# Patient Record
Sex: MALE | Race: WHITE | HISPANIC OR LATINO | Employment: UNEMPLOYED | URBAN - METROPOLITAN AREA
[De-identification: names, ages, dates, MRNs, and addresses within clinical notes are randomized per-mention and may not be internally consistent; named-entity substitution may affect disease eponyms.]

---

## 2018-06-25 ENCOUNTER — HOSPITAL ENCOUNTER (OUTPATIENT)
Dept: RADIOLOGY | Facility: MEDICAL CENTER | Age: 38
End: 2018-06-25

## 2018-06-25 ENCOUNTER — APPOINTMENT (OUTPATIENT)
Dept: RADIOLOGY | Facility: MEDICAL CENTER | Age: 38
DRG: 988 | End: 2018-06-25
Attending: EMERGENCY MEDICINE
Payer: COMMERCIAL

## 2018-06-25 ENCOUNTER — HOSPITAL ENCOUNTER (INPATIENT)
Facility: MEDICAL CENTER | Age: 38
LOS: 11 days | DRG: 988 | End: 2018-07-06
Attending: EMERGENCY MEDICINE | Admitting: FAMILY MEDICINE
Payer: COMMERCIAL

## 2018-06-25 DIAGNOSIS — F10.930 ALCOHOL WITHDRAWAL SEIZURE WITHOUT COMPLICATION (HCC): ICD-10-CM

## 2018-06-25 DIAGNOSIS — S02.32XA CLOSED FRACTURE OF LEFT ORBITAL FLOOR, INITIAL ENCOUNTER (HCC): ICD-10-CM

## 2018-06-25 DIAGNOSIS — R56.9 ALCOHOL WITHDRAWAL SEIZURE WITHOUT COMPLICATION (HCC): ICD-10-CM

## 2018-06-25 LAB
ALBUMIN SERPL BCP-MCNC: 4.3 G/DL (ref 3.2–4.9)
ALBUMIN/GLOB SERPL: 1.7 G/DL
ALP SERPL-CCNC: 61 U/L (ref 30–99)
ALT SERPL-CCNC: 42 U/L (ref 2–50)
ANION GAP SERPL CALC-SCNC: 15 MMOL/L (ref 0–11.9)
AST SERPL-CCNC: 91 U/L (ref 12–45)
BASOPHILS # BLD AUTO: 0.4 % (ref 0–1.8)
BASOPHILS # BLD: 0.04 K/UL (ref 0–0.12)
BILIRUB SERPL-MCNC: 2 MG/DL (ref 0.1–1.5)
BUN SERPL-MCNC: 6 MG/DL (ref 8–22)
CALCIUM SERPL-MCNC: 8.9 MG/DL (ref 8.5–10.5)
CHLORIDE SERPL-SCNC: 99 MMOL/L (ref 96–112)
CO2 SERPL-SCNC: 23 MMOL/L (ref 20–33)
CREAT SERPL-MCNC: 0.62 MG/DL (ref 0.5–1.4)
EOSINOPHIL # BLD AUTO: 0 K/UL (ref 0–0.51)
EOSINOPHIL NFR BLD: 0 % (ref 0–6.9)
ERYTHROCYTE [DISTWIDTH] IN BLOOD BY AUTOMATED COUNT: 48.6 FL (ref 35.9–50)
GLOBULIN SER CALC-MCNC: 2.6 G/DL (ref 1.9–3.5)
GLUCOSE SERPL-MCNC: 82 MG/DL (ref 65–99)
HCT VFR BLD AUTO: 40.4 % (ref 42–52)
HGB BLD-MCNC: 14 G/DL (ref 14–18)
IMM GRANULOCYTES # BLD AUTO: 0.03 K/UL (ref 0–0.11)
IMM GRANULOCYTES NFR BLD AUTO: 0.3 % (ref 0–0.9)
INR PPP: 1.1 (ref 0.87–1.13)
LYMPHOCYTES # BLD AUTO: 0.77 K/UL (ref 1–4.8)
LYMPHOCYTES NFR BLD: 8.1 % (ref 22–41)
MCH RBC QN AUTO: 33 PG (ref 27–33)
MCHC RBC AUTO-ENTMCNC: 34.7 G/DL (ref 33.7–35.3)
MCV RBC AUTO: 95.3 FL (ref 81.4–97.8)
MONOCYTES # BLD AUTO: 0.61 K/UL (ref 0–0.85)
MONOCYTES NFR BLD AUTO: 6.4 % (ref 0–13.4)
NEUTROPHILS # BLD AUTO: 8.11 K/UL (ref 1.82–7.42)
NEUTROPHILS NFR BLD: 84.8 % (ref 44–72)
NRBC # BLD AUTO: 0 K/UL
NRBC BLD-RTO: 0 /100 WBC
PLATELET # BLD AUTO: 70 K/UL (ref 164–446)
PMV BLD AUTO: 11 FL (ref 9–12.9)
POTASSIUM SERPL-SCNC: 3.8 MMOL/L (ref 3.6–5.5)
PROT SERPL-MCNC: 6.9 G/DL (ref 6–8.2)
PROTHROMBIN TIME: 13.9 SEC (ref 12–14.6)
RBC # BLD AUTO: 4.24 M/UL (ref 4.7–6.1)
SODIUM SERPL-SCNC: 137 MMOL/L (ref 135–145)
WBC # BLD AUTO: 9.6 K/UL (ref 4.8–10.8)

## 2018-06-25 PROCEDURE — 85025 COMPLETE CBC W/AUTO DIFF WBC: CPT

## 2018-06-25 PROCEDURE — A9270 NON-COVERED ITEM OR SERVICE: HCPCS | Performed by: EMERGENCY MEDICINE

## 2018-06-25 PROCEDURE — 99233 SBSQ HOSP IP/OBS HIGH 50: CPT | Performed by: FAMILY MEDICINE

## 2018-06-25 PROCEDURE — 303747 HCHG EXTRA SUTURE

## 2018-06-25 PROCEDURE — 700102 HCHG RX REV CODE 250 W/ 637 OVERRIDE(OP): Performed by: EMERGENCY MEDICINE

## 2018-06-25 PROCEDURE — 0WQ2XZZ REPAIR FACE, EXTERNAL APPROACH: ICD-10-PCS | Performed by: EMERGENCY MEDICINE

## 2018-06-25 PROCEDURE — 70486 CT MAXILLOFACIAL W/O DYE: CPT

## 2018-06-25 PROCEDURE — 99285 EMERGENCY DEPT VISIT HI MDM: CPT

## 2018-06-25 PROCEDURE — 80053 COMPREHEN METABOLIC PANEL: CPT

## 2018-06-25 PROCEDURE — 85610 PROTHROMBIN TIME: CPT

## 2018-06-25 PROCEDURE — 304999 HCHG REPAIR-SIMPLE/INTERMED LEVEL 1

## 2018-06-25 PROCEDURE — 770020 HCHG ROOM/CARE - TELE (206)

## 2018-06-25 PROCEDURE — HZ2ZZZZ DETOXIFICATION SERVICES FOR SUBSTANCE ABUSE TREATMENT: ICD-10-PCS | Performed by: FAMILY MEDICINE

## 2018-06-25 RX ORDER — LORAZEPAM 1 MG/1
1 TABLET ORAL ONCE
Status: COMPLETED | OUTPATIENT
Start: 2018-06-25 | End: 2018-06-25

## 2018-06-25 RX ADMIN — LORAZEPAM 1 MG: 1 TABLET ORAL at 21:38

## 2018-06-25 ASSESSMENT — ENCOUNTER SYMPTOMS
SEIZURES: 1
TINGLING: 0
NAUSEA: 0
DIZZINESS: 0
SPEECH CHANGE: 0
VOMITING: 0
CHILLS: 0
HEADACHES: 0
FEVER: 0
SENSORY CHANGE: 0
TREMORS: 0
LOSS OF CONSCIOUSNESS: 1

## 2018-06-25 ASSESSMENT — LIFESTYLE VARIABLES
TREMOR: MODERATE TREMOR WITH ARMS EXTENDED
AGITATION: NORMAL ACTIVITY
DOES PATIENT WANT TO TALK TO SOMEONE ABOUT QUITTING: NO
TACTILE DISTURBANCES: VERY MILD ITCHING, PINS AND NEEDLES SENSATION, BURNING OR NUMBNESS
ANXIETY: NO ANXIETY (AT EASE)
CONSUMPTION TOTAL: POSITIVE
EVER FELT BAD OR GUILTY ABOUT YOUR DRINKING: YES
NAUSEA AND VOMITING: NO NAUSEA AND NO VOMITING
TOTAL SCORE: 3
AVERAGE NUMBER OF DAYS PER WEEK YOU HAVE A DRINK CONTAINING ALCOHOL: 7
HOW MANY TIMES IN THE PAST YEAR HAVE YOU HAD 5 OR MORE DRINKS IN A DAY: 365
HAVE YOU EVER FELT YOU SHOULD CUT DOWN ON YOUR DRINKING: YES
TOTAL SCORE: 3
VISUAL DISTURBANCES: NOT PRESENT
AUDITORY DISTURBANCES: NOT PRESENT
TOTAL SCORE: 3
EVER HAD A DRINK FIRST THING IN THE MORNING TO STEADY YOUR NERVES TO GET RID OF A HANGOVER: YES
TOTAL SCORE: 9
HEADACHE, FULLNESS IN HEAD: VERY MILD
ORIENTATION AND CLOUDING OF SENSORIUM: ORIENTED AND CAN DO SERIAL ADDITIONS
DO YOU DRINK ALCOHOL: YES
HAVE PEOPLE ANNOYED YOU BY CRITICIZING YOUR DRINKING: NO
ON A TYPICAL DAY WHEN YOU DRINK ALCOHOL HOW MANY DRINKS DO YOU HAVE: 7
PAROXYSMAL SWEATS: *
DOES PATIENT WANT TO STOP DRINKING: YES

## 2018-06-25 ASSESSMENT — PAIN SCALES - GENERAL: PAINLEVEL_OUTOF10: 2

## 2018-06-26 ENCOUNTER — PATIENT OUTREACH (OUTPATIENT)
Dept: HEALTH INFORMATION MANAGEMENT | Facility: OTHER | Age: 38
End: 2018-06-26

## 2018-06-26 PROBLEM — Z72.0 TOBACCO USE: Status: ACTIVE | Noted: 2018-06-26

## 2018-06-26 PROBLEM — F10.931 DELIRIUM TREMENS (HCC): Status: ACTIVE | Noted: 2018-06-26

## 2018-06-26 PROBLEM — S02.85XA CLOSED FRACTURE OF ORBIT (HCC): Status: ACTIVE | Noted: 2018-06-26

## 2018-06-26 PROBLEM — S09.93XA FACIAL TRAUMA: Status: ACTIVE | Noted: 2018-06-26

## 2018-06-26 PROBLEM — D69.6 THROMBOCYTOPENIA (HCC): Status: ACTIVE | Noted: 2018-06-26

## 2018-06-26 LAB
EKG IMPRESSION: NORMAL
EKG IMPRESSION: NORMAL
TSH SERPL DL<=0.005 MIU/L-ACNC: 2.36 UIU/ML (ref 0.38–5.33)

## 2018-06-26 PROCEDURE — 700102 HCHG RX REV CODE 250 W/ 637 OVERRIDE(OP): Performed by: FAMILY MEDICINE

## 2018-06-26 PROCEDURE — 99232 SBSQ HOSP IP/OBS MODERATE 35: CPT | Performed by: FAMILY MEDICINE

## 2018-06-26 PROCEDURE — A9270 NON-COVERED ITEM OR SERVICE: HCPCS | Performed by: FAMILY MEDICINE

## 2018-06-26 PROCEDURE — 700101 HCHG RX REV CODE 250: Performed by: FAMILY MEDICINE

## 2018-06-26 PROCEDURE — 700102 HCHG RX REV CODE 250 W/ 637 OVERRIDE(OP): Performed by: INTERNAL MEDICINE

## 2018-06-26 PROCEDURE — 84443 ASSAY THYROID STIM HORMONE: CPT

## 2018-06-26 PROCEDURE — 700111 HCHG RX REV CODE 636 W/ 250 OVERRIDE (IP): Performed by: FAMILY MEDICINE

## 2018-06-26 PROCEDURE — A9270 NON-COVERED ITEM OR SERVICE: HCPCS | Performed by: INTERNAL MEDICINE

## 2018-06-26 PROCEDURE — 700105 HCHG RX REV CODE 258: Performed by: FAMILY MEDICINE

## 2018-06-26 PROCEDURE — 770022 HCHG ROOM/CARE - ICU (200)

## 2018-06-26 PROCEDURE — 93005 ELECTROCARDIOGRAM TRACING: CPT | Performed by: FAMILY MEDICINE

## 2018-06-26 PROCEDURE — 93010 ELECTROCARDIOGRAM REPORT: CPT | Performed by: INTERNAL MEDICINE

## 2018-06-26 PROCEDURE — 93010 ELECTROCARDIOGRAM REPORT: CPT | Mod: 77 | Performed by: INTERNAL MEDICINE

## 2018-06-26 RX ORDER — LORAZEPAM 0.5 MG/1
0.5 TABLET ORAL EVERY 4 HOURS PRN
Status: DISCONTINUED | OUTPATIENT
Start: 2018-06-26 | End: 2018-06-27

## 2018-06-26 RX ORDER — PROMETHAZINE HYDROCHLORIDE 25 MG/1
12.5-25 SUPPOSITORY RECTAL EVERY 4 HOURS PRN
Status: DISCONTINUED | OUTPATIENT
Start: 2018-06-26 | End: 2018-07-06 | Stop reason: HOSPADM

## 2018-06-26 RX ORDER — LORAZEPAM 1 MG/1
1 TABLET ORAL EVERY 4 HOURS PRN
Status: DISCONTINUED | OUTPATIENT
Start: 2018-06-26 | End: 2018-06-27

## 2018-06-26 RX ORDER — BACITRACIN ZINC AND POLYMYXIN B SULFATE 500; 1000 [USP'U]/G; [USP'U]/G
OINTMENT TOPICAL 2 TIMES DAILY
Status: DISCONTINUED | OUTPATIENT
Start: 2018-06-26 | End: 2018-07-06 | Stop reason: HOSPADM

## 2018-06-26 RX ORDER — PROMETHAZINE HYDROCHLORIDE 25 MG/1
12.5-25 TABLET ORAL EVERY 4 HOURS PRN
Status: DISCONTINUED | OUTPATIENT
Start: 2018-06-26 | End: 2018-06-27

## 2018-06-26 RX ORDER — NICOTINE 21 MG/24HR
14 PATCH, TRANSDERMAL 24 HOURS TRANSDERMAL
Status: DISCONTINUED | OUTPATIENT
Start: 2018-06-26 | End: 2018-07-06 | Stop reason: HOSPADM

## 2018-06-26 RX ORDER — SODIUM CHLORIDE 9 MG/ML
INJECTION, SOLUTION INTRAVENOUS CONTINUOUS
Status: DISPENSED | OUTPATIENT
Start: 2018-06-26 | End: 2018-06-27

## 2018-06-26 RX ORDER — LORAZEPAM 2 MG/ML
0.5 INJECTION INTRAMUSCULAR EVERY 4 HOURS PRN
Status: DISCONTINUED | OUTPATIENT
Start: 2018-06-26 | End: 2018-06-27

## 2018-06-26 RX ORDER — AMOXICILLIN 250 MG
2 CAPSULE ORAL 2 TIMES DAILY
Status: DISCONTINUED | OUTPATIENT
Start: 2018-06-26 | End: 2018-06-27

## 2018-06-26 RX ORDER — ONDANSETRON 2 MG/ML
4 INJECTION INTRAMUSCULAR; INTRAVENOUS EVERY 4 HOURS PRN
Status: DISCONTINUED | OUTPATIENT
Start: 2018-06-26 | End: 2018-07-06 | Stop reason: HOSPADM

## 2018-06-26 RX ORDER — THIAMINE MONONITRATE (VIT B1) 100 MG
100 TABLET ORAL DAILY
Status: DISCONTINUED | OUTPATIENT
Start: 2018-06-27 | End: 2018-06-27

## 2018-06-26 RX ORDER — LORAZEPAM 1 MG/1
2 TABLET ORAL
Status: DISCONTINUED | OUTPATIENT
Start: 2018-06-26 | End: 2018-06-27

## 2018-06-26 RX ORDER — LORAZEPAM 2 MG/ML
2 INJECTION INTRAMUSCULAR
Status: DISCONTINUED | OUTPATIENT
Start: 2018-06-26 | End: 2018-06-27

## 2018-06-26 RX ORDER — LORAZEPAM 1 MG/1
4 TABLET ORAL
Status: DISCONTINUED | OUTPATIENT
Start: 2018-06-26 | End: 2018-06-27

## 2018-06-26 RX ORDER — LORAZEPAM 2 MG/ML
1 INJECTION INTRAMUSCULAR
Status: DISCONTINUED | OUTPATIENT
Start: 2018-06-26 | End: 2018-06-27

## 2018-06-26 RX ORDER — ENALAPRILAT 1.25 MG/ML
1.25 INJECTION INTRAVENOUS EVERY 6 HOURS PRN
Status: DISCONTINUED | OUTPATIENT
Start: 2018-06-26 | End: 2018-07-06 | Stop reason: HOSPADM

## 2018-06-26 RX ORDER — GABAPENTIN 100 MG/1
100 CAPSULE ORAL 3 TIMES DAILY
Status: DISCONTINUED | OUTPATIENT
Start: 2018-06-26 | End: 2018-06-27

## 2018-06-26 RX ORDER — FOLIC ACID 1 MG/1
1 TABLET ORAL DAILY
Status: DISCONTINUED | OUTPATIENT
Start: 2018-06-27 | End: 2018-06-27

## 2018-06-26 RX ORDER — BISACODYL 10 MG
10 SUPPOSITORY, RECTAL RECTAL
Status: DISCONTINUED | OUTPATIENT
Start: 2018-06-26 | End: 2018-06-27

## 2018-06-26 RX ORDER — LORAZEPAM 1 MG/1
3 TABLET ORAL
Status: DISCONTINUED | OUTPATIENT
Start: 2018-06-26 | End: 2018-06-27

## 2018-06-26 RX ORDER — LORAZEPAM 2 MG/ML
1.5 INJECTION INTRAMUSCULAR
Status: DISCONTINUED | OUTPATIENT
Start: 2018-06-26 | End: 2018-06-27

## 2018-06-26 RX ORDER — DIVALPROEX SODIUM 125 MG/1
250 CAPSULE, COATED PELLETS ORAL EVERY 8 HOURS
Status: DISCONTINUED | OUTPATIENT
Start: 2018-06-26 | End: 2018-06-27

## 2018-06-26 RX ORDER — POLYETHYLENE GLYCOL 3350 17 G/17G
1 POWDER, FOR SOLUTION ORAL
Status: DISCONTINUED | OUTPATIENT
Start: 2018-06-26 | End: 2018-06-27

## 2018-06-26 RX ORDER — ONDANSETRON 4 MG/1
4 TABLET, ORALLY DISINTEGRATING ORAL EVERY 4 HOURS PRN
Status: DISCONTINUED | OUTPATIENT
Start: 2018-06-26 | End: 2018-07-01

## 2018-06-26 RX ORDER — HYDROCODONE BITARTRATE AND ACETAMINOPHEN 5; 325 MG/1; MG/1
1 TABLET ORAL EVERY 6 HOURS PRN
Status: DISCONTINUED | OUTPATIENT
Start: 2018-06-26 | End: 2018-06-27

## 2018-06-26 RX ADMIN — HYDROCODONE BITARTRATE AND ACETAMINOPHEN 1 TABLET: 5; 325 TABLET ORAL at 01:57

## 2018-06-26 RX ADMIN — SODIUM CHLORIDE: 9 INJECTION, SOLUTION INTRAVENOUS at 01:02

## 2018-06-26 RX ADMIN — SODIUM CHLORIDE: 9 INJECTION, SOLUTION INTRAVENOUS at 22:10

## 2018-06-26 RX ADMIN — LORAZEPAM 0.5 MG: 0.5 TABLET ORAL at 11:55

## 2018-06-26 RX ADMIN — LORAZEPAM 2 MG: 1 TABLET ORAL at 01:20

## 2018-06-26 RX ADMIN — LORAZEPAM 1 MG: 1 TABLET ORAL at 07:01

## 2018-06-26 RX ADMIN — Medication 1 EACH: at 15:52

## 2018-06-26 RX ADMIN — POTASSIUM CHLORIDE: 2 INJECTION, SOLUTION, CONCENTRATE INTRAVENOUS at 01:57

## 2018-06-26 RX ADMIN — SODIUM CHLORIDE: 9 INJECTION, SOLUTION INTRAVENOUS at 09:14

## 2018-06-26 RX ADMIN — LORAZEPAM 3 MG: 1 TABLET ORAL at 20:28

## 2018-06-26 RX ADMIN — LORAZEPAM 1 MG: 1 TABLET ORAL at 03:10

## 2018-06-26 RX ADMIN — LORAZEPAM 1 MG: 2 INJECTION INTRAMUSCULAR; INTRAVENOUS at 16:40

## 2018-06-26 RX ADMIN — GABAPENTIN 100 MG: 100 CAPSULE ORAL at 18:30

## 2018-06-26 RX ADMIN — LORAZEPAM 1 MG: 1 TABLET ORAL at 15:48

## 2018-06-26 RX ADMIN — LORAZEPAM 3 MG: 1 TABLET ORAL at 19:17

## 2018-06-26 RX ADMIN — HYDROCODONE BITARTRATE AND ACETAMINOPHEN 1 TABLET: 5; 325 TABLET ORAL at 11:55

## 2018-06-26 RX ADMIN — DIVALPROEX SODIUM 250 MG: 125 CAPSULE, COATED PELLETS ORAL at 22:00

## 2018-06-26 RX ADMIN — Medication 1 EACH: at 18:30

## 2018-06-26 RX ADMIN — LORAZEPAM 1 MG: 1 TABLET ORAL at 18:30

## 2018-06-26 ASSESSMENT — LIFESTYLE VARIABLES
AUDITORY DISTURBANCES: NOT PRESENT
AGITATION: *
ANXIETY: NO ANXIETY (AT EASE)
AGITATION: *
ORIENTATION AND CLOUDING OF SENSORIUM: ORIENTED AND CAN DO SERIAL ADDITIONS
AUDITORY DISTURBANCES: NOT PRESENT
TOTAL SCORE: 20
TOTAL SCORE: VERY MILD ITCHING, PINS AND NEEDLES SENSATION, BURNING OR NUMBNESS
ORIENTATION AND CLOUDING OF SENSORIUM: DATE DISORIENTATION BY MORE THAN TWO CALENDAR DAYS
TOTAL SCORE: 9
VISUAL DISTURBANCES: NOT PRESENT
ANXIETY: MILDLY ANXIOUS
PAROXYSMAL SWEATS: BARELY PERCEPTIBLE SWEATING. PALMS MOIST
AGITATION: PACES BACK AND FORTH DURING MOST OF THE INTERVIEW OR CONSTANTLY THRASHES ABOUT.
HEADACHE, FULLNESS IN HEAD: NOT PRESENT
TOTAL SCORE: VERY MILD ITCHING, PINS AND NEEDLES SENSATION, BURNING OR NUMBNESS
AUDITORY DISTURBANCES: NOT PRESENT
AUDITORY DISTURBANCES: NOT PRESENT
TOTAL SCORE: VERY MILD ITCHING, PINS AND NEEDLES SENSATION, BURNING OR NUMBNESS
PAROXYSMAL SWEATS: *
TREMOR: TREMOR NOT VISIBLE BUT CAN BE FELT, FINGERTIP TO FINGERTIP
TREMOR: TREMOR NOT VISIBLE BUT CAN BE FELT, FINGERTIP TO FINGERTIP
TOTAL SCORE: 9
AGITATION: PACES BACK AND FORTH DURING MOST OF THE INTERVIEW OR CONSTANTLY THRASHES ABOUT.
ANXIETY: EQUIVALENT TO ACUTE PANIC STATES AS OCCUR IN SEVERE DELIRIUM OR ACUTE SCHIZOPHRENIC REACTIONS
ANXIETY: EQUIVALENT TO ACUTE PANIC STATES AS OCCUR IN SEVERE DELIRIUM OR ACUTE SCHIZOPHRENIC REACTIONS
ANXIETY: *
NAUSEA AND VOMITING: NO NAUSEA AND NO VOMITING
PAROXYSMAL SWEATS: BEADS OF SWEAT OBVIOUS ON FOREHEAD
HEADACHE, FULLNESS IN HEAD: VERY MILD
TREMOR: *
AUDITORY DISTURBANCES: NOT PRESENT
TREMOR: MODERATE TREMOR WITH ARMS EXTENDED
ORIENTATION AND CLOUDING OF SENSORIUM: ORIENTED AND CAN DO SERIAL ADDITIONS
NAUSEA AND VOMITING: NO NAUSEA AND NO VOMITING
PAROXYSMAL SWEATS: NO SWEAT VISIBLE
AUDITORY DISTURBANCES: NOT PRESENT
TOTAL SCORE: 18
AUDITORY DISTURBANCES: NOT PRESENT
TOTAL SCORE: 11
PAROXYSMAL SWEATS: *
AUDITORY DISTURBANCES: NOT PRESENT
AGITATION: NORMAL ACTIVITY
HEADACHE, FULLNESS IN HEAD: VERY MILD
HEADACHE, FULLNESS IN HEAD: NOT PRESENT
ANXIETY: EQUIVALENT TO ACUTE PANIC STATES AS OCCUR IN SEVERE DELIRIUM OR ACUTE SCHIZOPHRENIC REACTIONS
ORIENTATION AND CLOUDING OF SENSORIUM: DATE DISORIENTATION BY MORE THAN TWO CALENDAR DAYS
PAROXYSMAL SWEATS: *
ORIENTATION AND CLOUDING OF SENSORIUM: ORIENTED AND CAN DO SERIAL ADDITIONS
NAUSEA AND VOMITING: NO NAUSEA AND NO VOMITING
TOTAL SCORE: MODERATE ITCHING, PINS AND NEEDLES SENSATION, BURNING OR NUMBNESS
ANXIETY: NO ANXIETY (AT EASE)
VISUAL DISTURBANCES: NOT PRESENT
TOTAL SCORE: 17
ORIENTATION AND CLOUDING OF SENSORIUM: ORIENTED AND CAN DO SERIAL ADDITIONS
AGITATION: *
TREMOR: MODERATE TREMOR WITH ARMS EXTENDED
VISUAL DISTURBANCES: NOT PRESENT
NAUSEA AND VOMITING: NO NAUSEA AND NO VOMITING
HEADACHE, FULLNESS IN HEAD: NOT PRESENT
NAUSEA AND VOMITING: NO NAUSEA AND NO VOMITING
TOTAL SCORE: 8
NAUSEA AND VOMITING: NO NAUSEA AND NO VOMITING
HEADACHE, FULLNESS IN HEAD: NOT PRESENT
AGITATION: NORMAL ACTIVITY
NAUSEA AND VOMITING: NO NAUSEA AND NO VOMITING
AGITATION: MODERATELY FIDGETY AND RESTLESS
VISUAL DISTURBANCES: NOT PRESENT
PAROXYSMAL SWEATS: BEADS OF SWEAT OBVIOUS ON FOREHEAD
NAUSEA AND VOMITING: NO NAUSEA AND NO VOMITING
HEADACHE, FULLNESS IN HEAD: VERY MILD
HEADACHE, FULLNESS IN HEAD: VERY MILD
AGITATION: PACES BACK AND FORTH DURING MOST OF THE INTERVIEW OR CONSTANTLY THRASHES ABOUT.
ORIENTATION AND CLOUDING OF SENSORIUM: CANNOT DO SERIAL ADDITIONS OR IS UNCERTAIN ABOUT DATE
HEADACHE, FULLNESS IN HEAD: VERY MILD
NAUSEA AND VOMITING: NO NAUSEA AND NO VOMITING
PAROXYSMAL SWEATS: BARELY PERCEPTIBLE SWEATING. PALMS MOIST
TOTAL SCORE: 24
AUDITORY DISTURBANCES: NOT PRESENT
ANXIETY: NO ANXIETY (AT EASE)
ORIENTATION AND CLOUDING OF SENSORIUM: ORIENTED AND CAN DO SERIAL ADDITIONS
ORIENTATION AND CLOUDING OF SENSORIUM: ORIENTED AND CAN DO SERIAL ADDITIONS
AGITATION: PACES BACK AND FORTH DURING MOST OF THE INTERVIEW OR CONSTANTLY THRASHES ABOUT.
VISUAL DISTURBANCES: NOT PRESENT
AUDITORY DISTURBANCES: NOT PRESENT
TOTAL SCORE: 8
NAUSEA AND VOMITING: NO NAUSEA AND NO VOMITING
EVER_SMOKED: YES
AUDITORY DISTURBANCES: NOT PRESENT
ANXIETY: NO ANXIETY (AT EASE)
NAUSEA AND VOMITING: NO NAUSEA AND NO VOMITING
AUDITORY DISTURBANCES: NOT PRESENT
TREMOR: MODERATE TREMOR WITH ARMS EXTENDED
ORIENTATION AND CLOUDING OF SENSORIUM: DATE DISORIENTATION BY MORE THAN TWO CALENDAR DAYS
TREMOR: *
VISUAL DISTURBANCES: NOT PRESENT
TOTAL SCORE: 7
TOTAL SCORE: 14
NAUSEA AND VOMITING: NO NAUSEA AND NO VOMITING
ORIENTATION AND CLOUDING OF SENSORIUM: ORIENTED AND CAN DO SERIAL ADDITIONS
VISUAL DISTURBANCES: NOT PRESENT
ORIENTATION AND CLOUDING OF SENSORIUM: DATE DISORIENTATION BY MORE THAN TWO CALENDAR DAYS
HEADACHE, FULLNESS IN HEAD: VERY MILD
VISUAL DISTURBANCES: NOT PRESENT
VISUAL DISTURBANCES: NOT PRESENT
ANXIETY: MILDLY ANXIOUS
PAROXYSMAL SWEATS: *
TOTAL SCORE: 23
AGITATION: NORMAL ACTIVITY
VISUAL DISTURBANCES: NOT PRESENT
HEADACHE, FULLNESS IN HEAD: VERY MILD
PAROXYSMAL SWEATS: BARELY PERCEPTIBLE SWEATING. PALMS MOIST
TREMOR: MODERATE TREMOR WITH ARMS EXTENDED
AGITATION: NORMAL ACTIVITY
ANXIETY: MILDLY ANXIOUS
TREMOR: *
VISUAL DISTURBANCES: NOT PRESENT
PAROXYSMAL SWEATS: *
VISUAL DISTURBANCES: NOT PRESENT
PAROXYSMAL SWEATS: *
HEADACHE, FULLNESS IN HEAD: NOT PRESENT
ANXIETY: MODERATELY ANXIOUS OR GUARDED, SO ANXIETY IS INFERRED
TREMOR: TREMOR NOT VISIBLE BUT CAN BE FELT, FINGERTIP TO FINGERTIP
TOTAL SCORE: VERY MILD ITCHING, PINS AND NEEDLES SENSATION, BURNING OR NUMBNESS
TREMOR: *
TREMOR: MODERATE TREMOR WITH ARMS EXTENDED

## 2018-06-26 ASSESSMENT — ENCOUNTER SYMPTOMS
VOMITING: 0
MYALGIAS: 0
SHORTNESS OF BREATH: 0
NECK PAIN: 0
BRUISES/BLEEDS EASILY: 0
DEPRESSION: 0
HEARTBURN: 0
WHEEZING: 0
DIZZINESS: 0
NAUSEA: 0
SPEECH CHANGE: 0
DOUBLE VISION: 0
SORE THROAT: 0
NERVOUS/ANXIOUS: 0
BLURRED VISION: 0
SENSORY CHANGE: 0
FALLS: 1
FOCAL WEAKNESS: 0
DIARRHEA: 0
CHILLS: 0
FEVER: 0
NAUSEA: 1
BACK PAIN: 0
PALPITATIONS: 0
HEADACHES: 0
HEMOPTYSIS: 0
WEAKNESS: 0
ABDOMINAL PAIN: 0
COUGH: 0

## 2018-06-26 ASSESSMENT — PATIENT HEALTH QUESTIONNAIRE - PHQ9
1. LITTLE INTEREST OR PLEASURE IN DOING THINGS: NOT AT ALL
2. FEELING DOWN, DEPRESSED, IRRITABLE, OR HOPELESS: NOT AT ALL
SUM OF ALL RESPONSES TO PHQ9 QUESTIONS 1 AND 2: 0
2. FEELING DOWN, DEPRESSED, IRRITABLE, OR HOPELESS: NOT AT ALL
1. LITTLE INTEREST OR PLEASURE IN DOING THINGS: NOT AT ALL
SUM OF ALL RESPONSES TO PHQ9 QUESTIONS 1 AND 2: 0

## 2018-06-26 ASSESSMENT — PAIN SCALES - GENERAL
PAINLEVEL_OUTOF10: 0
PAINLEVEL_OUTOF10: 1
PAINLEVEL_OUTOF10: 8
PAINLEVEL_OUTOF10: 1
PAINLEVEL_OUTOF10: 0
PAINLEVEL_OUTOF10: 8
PAINLEVEL_OUTOF10: 0

## 2018-06-26 NOTE — CARE PLAN
Problem: Knowledge Deficit  Goal: Knowledge of disease process/condition, treatment plan, diagnostic tests, and medications will improve  Outcome: PROGRESSING AS EXPECTED  All tests, procedures, scales, and assessment tools explained to patient.  Adequate patient education provided throughout entirety of shift

## 2018-06-26 NOTE — DIETARY
Nutrition Services: Poor PO and weight loss noted in nutrition admit screen.  37 year old male admitted for delirium tremens and alcohol withdrawal.  I visited with patient at bedside this morning who reports that about a month ago he lost 10 pounds unintentionally but has since gained it back.  He reports that currently he is at his usual body weight and is having a good appetite.  He is receiving a regular diet and reports he is eating most of his meals.   No other nutrition related triggers noted at this time.    RD monitoring

## 2018-06-26 NOTE — CARE PLAN
Problem: Pain Management  Goal: Pain level will decrease to patient's comfort goal  Outcome: PROGRESSING AS EXPECTED  Patient pain able to be managed with pain medications as needed

## 2018-06-26 NOTE — WOUND TEAM
Wound team consulted regarding pts multiple abrasions to face and FA. This RN in to assess. Facial wounds were cleansed overnight. L Eye remains swollen shut. This RN ordered Polysporin to be applied BID. No advanced wound care needs identified. Wound team signing off. Please reconsult with any new advanced wound care needs.

## 2018-06-26 NOTE — CARE PLAN
Problem: Infection  Goal: Will remain free from infection    Intervention: Assess signs and symptoms of infection  Appropriate protocols and standards utilized to minimize likelihood of infection and the spread of infection. Proper hand hygiene utilized and pt and family members educated on hand hygiene.       Problem: Knowledge Deficit  Goal: Knowledge of disease process/condition, treatment plan, diagnostic tests, and medications will improve    Intervention: Assess knowledge level of disease process/condition, treatment plan, diagnostic tests, and medications  Pt educated on each medication with medication passes. Pt verbalize understanding.

## 2018-06-26 NOTE — PROGRESS NOTES
RN assumes care of patient.  CIWA completed at 0700 during bedside report, patient medicated appropriately.  Call bell is with in reach, bed is locked and in lowest position, fall and seizure precautions in place.  Patient has no other needs at this time.

## 2018-06-26 NOTE — ASSESSMENT & PLAN NOTE
Requiring ICU admission.  Status post phenobarbital protocol followed by an IV Precedex drip which has been stopped.

## 2018-06-26 NOTE — ASSESSMENT & PLAN NOTE
With orbital fracture and anisocoria  Dr. Terrazas recommended outpatient follow-up for treatment of his fracture after swelling has improved  Evaluated by ophthalmology who recommended clinical monitoring and outpatient follow-up    Continue Augmentin to complete 10 day course given sinus hemorrhage and increased risk of sinusitis

## 2018-06-26 NOTE — H&P
Hospital Medicine History and Physical    Date of Service  6/25/2018    Chief Complaint  Chief Complaint   Patient presents with   • Seizure     Was at court house and had seizure. Hx of alcohol withdrawl seizures, last drink yesterday   • Facial Injury     facial fractures including left orbit. Left eye swollen shut       History of Presenting Illness  37 y.o. male who presents to the Emergency Department after a seizure and facial injury. Patient reports that he was at the court house for a speeding ticket and was walking out and had a seizure that he believes to be secondary to withdrawal as his last drink was yesterday. Patient reports that this is his second seizure he has had and normally drinking 6-8 beers a day and quit drinking about 1.5 days ago. Patient was seen in [place] prior to arrival here and was transferred her after a CT scan showed a left orbit fracture and his left eye. Patient also reports a history of back injury years ago with intermittent lower extremity tingling that he has never had checked. Patient denies any tongue bite, nausea, vomiting, weakness, dizziness, confusion or other trauma  Primary Care Physician  Pcp Pt States None    Consultants  Maxillofacial surgeon    Code Status  Full    Review of Systems  Review of Systems   Constitutional: Negative for chills and fever.   HENT: Negative for hearing loss and tinnitus.    Eyes: Negative for blurred vision and double vision.   Respiratory: Negative for cough and hemoptysis.    Cardiovascular: Negative for chest pain and palpitations.   Gastrointestinal: Positive for nausea. Negative for heartburn and vomiting.   Genitourinary: Negative for dysuria and urgency.   Musculoskeletal: Positive for falls. Negative for myalgias.   Skin: Negative for rash.   Neurological: Negative for dizziness and headaches.   Endo/Heme/Allergies: Does not bruise/bleed easily.   Psychiatric/Behavioral: Negative for depression and suicidal ideas.        Past  Medical History  Denies any    Surgical History  Past Surgical History:   Procedure Laterality Date   • MANDIBLE FRACTURE ORIF  2011    Performed by LOYDA COX at SURGERY [facility]   • NASAL FRACTURE REDUCTION CLOSED  2011    Performed by LOYDA COX at SURGERY [facility]   • LACERATION REPAIR  2011    Performed by LOYDA COX at SURGERY [facility]       Medications  No current facility-administered medications on file prior to encounter.      No current outpatient prescriptions on file prior to encounter.       Family History  History reviewed. No pertinent family history.    Social History  Social History   Substance Use Topics   • Smoking status: Current Every Day Smoker     Packs/day: 0.20   • Smokeless tobacco: Never Used   • Alcohol use Yes      Comment: 6x 24oz beer daily       Allergies  No Known Allergies     Physical Exam  Laboratory   Hemodynamics  Temp (24hrs), Av.6 °C (97.9 °F), Min:36.3 °C (97.3 °F), Max:37.2 °C (98.9 °F)   Temperature: 36.4 °C (97.6 °F)  Pulse  Av.5  Min: 86  Max: 100 Heart Rate (Monitored): 87  Blood Pressure: 127/87, NIBP: 116/74      Respiratory      Respiration: 17, Pulse Oximetry: 93 %             Physical Exam   Constitutional: He is oriented to person, place, and time. He appears well-developed. No distress.   HENT:   Head: Normocephalic. Head is with abrasion and with contusion.   Ecchymosis and swelling on the left orbital and periorbital area with laceration on the left lateral orbital area that is sutured.   Neck: Neck supple. No JVD present. No tracheal deviation present.   Cardiovascular: Normal rate and regular rhythm.  Exam reveals no friction rub.    Pulmonary/Chest: Effort normal and breath sounds normal. No respiratory distress.   Abdominal: Bowel sounds are normal. He exhibits no distension. There is no tenderness.   Musculoskeletal: Normal range of motion. He exhibits no deformity.   Neurological: He is alert and  oriented to person, place, and time.   Skin: Skin is dry. No erythema.   Psychiatric: He has a normal mood and affect. His behavior is normal.       Recent Labs      06/25/18 1935   WBC  9.6   RBC  4.24*   HEMOGLOBIN  14.0   HEMATOCRIT  40.4*   MCV  95.3   MCH  33.0   MCHC  34.7   RDW  48.6   PLATELETCT  70*   MPV  11.0     Recent Labs      06/25/18 1935   SODIUM  137   POTASSIUM  3.8   CHLORIDE  99   CO2  23   GLUCOSE  82   BUN  6*   CREATININE  0.62   CALCIUM  8.9     Recent Labs      06/25/18 1935   ALTSGPT  42   ASTSGOT  91*   ALKPHOSPHAT  61   TBILIRUBIN  2.0*   GLUCOSE  82     Recent Labs      06/25/18 1935   INR  1.10             No results found for: TROPONINI  Urinalysis:  No results found for: SPECGRAVITY, GLUCOSEUR, KETONES, NITRITE, WBCURINE, RBCURINE, BACTERIA, EPITHELCELL     Imaging  Reviewed.    Assessment/Plan     I anticipate this patient will require at least 2 midnight stay for appropriate medical management.    * Delirium tremens (HCC)- (present on admission)   Assessment & Plan    Patient had seizure from likely alcohol withdrawal. This resulted in facial trauma and orbital and maxillary fractures. On seizure precautions. CIWA protocol for withdrawal.        Facial trauma- (present on admission)   Assessment & Plan    CT scan facial showed acute mildly displaced fracture of the left orbital floor with herniation of orbital fat. There are also fractures of the left medial and lateral maxillary wall , similar to prior exam and could be chronic. There is blood in the left maxillary sinus. Extensive soft tissue swelling about the left eyelid and left face.   Maxillofacial surgeon did not recommend any surgical intervention and advised the patient to follow-up as an outpatient. On fall precautions.          Alcohol abuse- (present on admission)   Assessment & Plan    With history of alcohol withdrawal. On CIWA protocol. Banana bag. Daily folate acid and thiamine. Seizure precautions.         Thrombocytopenia (HCC)- (present on admission)   Assessment & Plan    Likely secondary to toxic effect of alcohol on bone marrow. Continue to monitor. Transfuse if drops below 10K.            VTE prophylaxis: SCDs

## 2018-06-26 NOTE — PROGRESS NOTES
Report received ED. Pt placed on zoll for transport. Pt placed on monitor, monitor room notified. Vitals signs taken, pt ambulated to restroom. Will continue to monitor.

## 2018-06-26 NOTE — PROGRESS NOTES
"Patient ripping on sutures despite frequent reminding.  Patient is increasingly agitated and is threatening to leave AMA because he is \" not withdrawing\"   "

## 2018-06-26 NOTE — ASSESSMENT & PLAN NOTE
Likely secondary to alcoholism    Platelet counts improved no signs of active bleeding at this time

## 2018-06-26 NOTE — ED TRIAGE NOTES
Chief Complaint   Patient presents with   • Seizure     Was at court house and had seizure. Hx of alcohol withdrawl seizures, last drink yesterday   • Facial Injury     facial fractures including left orbit. Left eye swollen shut     Transfer from [facility] by [facility] for above. VSS. Given ativan 2mg, zofran 4mg and NS 1000mL PTA. Explained triage process, to waiting room. Asked to inform RN if questions or concerns arise.

## 2018-06-26 NOTE — PROGRESS NOTES
2 RN skin note   Right side- shoulder, elbow, forearm, wrist, hand and knuckles  abrasions  Left side- fingers, knuckles, and elbow abrasions  Left knee abrasion and bruise  Pt left face swollen,stitched, bruised, abrasion and excoriation.   All other skin intact

## 2018-06-26 NOTE — ED PROVIDER NOTES
ED Provider Note    Scribed for Javed Araujo M.D. by Virgilio Saha. 6/25/2018, 5:41 PM.    Primary care provider: Pcp Unobtainable By   Means of arrival: med flight   History obtained from: patient   History limited by: none     CHIEF COMPLAINT  Chief Complaint   Patient presents with   • Seizure     Was at court house and had seizure. Hx of alcohol withdrawl seizures, last drink yesterday   • Facial Injury     facial fractures including left orbit. Left eye swollen shut       HPI  Denilson Chen is a 37 y.o. male who presents to the Emergency Department after a seizure and facial injury. Patient reports that he was at the court house for a speeding ticket and was walking out and had a seizure that he believes to be secondary to withdrawal as his last drink was yesterday. Patient reports that this is his second seizure he has had and normally drinking 6-8 beers a day and quit drinking about 1.5 days ago. Patient was seen in [place] prior to arrival here and was transferred her after a CT scan showed a left orbit fracture and his left eye. Patient also reports a history of back injury years ago with intermittent lower extremity tingling that he has never had checked. Patient denies any tongue bite, nausea, vomiting, weakness, dizziness, confusion or other trauma at this time.     REVIEW OF SYSTEMS  Review of Systems   Constitutional: Negative for chills and fever.   Eyes:        Left eye swollen shut from orbit fracture    Gastrointestinal: Negative for nausea and vomiting.   Neurological: Positive for seizures and loss of consciousness. Negative for dizziness, tingling, tremors, sensory change, speech change and headaches.   All other systems reviewed and are negative.      PAST MEDICAL HISTORY   Alcohol withdrawal seizures.     SURGICAL HISTORY   has a past surgical history that includes mandible fracture orif (6/9/2011); nasal fracture reduction closed (6/9/2011); and laceration repair  "(6/9/2011).    SOCIAL HISTORY  Social History   Substance Use Topics   • Smoking status: Current Every Day Smoker     Packs/day: 0.20   • Smokeless tobacco: Never Used   • Alcohol use Yes      Comment: 6x 24oz beer daily      History   Drug Use No       FAMILY HISTORY  History reviewed. No pertinent family history.    CURRENT MEDICATIONS  Home Medications     Reviewed by Efe Maciel R.N. (Registered Nurse) on 06/25/18 at 1727  Med List Status: Complete   Medication Last Dose Status        Patient Oswald Taking any Medications                       ALLERGIES  No Known Allergies    PHYSICAL EXAM  VITAL SIGNS: /81   Pulse 89   Temp 36.3 °C (97.3 °F)   Resp (!) 42   Ht 1.803 m (5' 11\")   Wt 90.7 kg (200 lb)   SpO2 99%   BMI 27.89 kg/m²     Constitutional:  Mild acute distress  HENT: Significant swelling to the left cheek and eye. Moist mucous membranes  Eyes: near complete subconjunctival hemorrhage. PERRL, cornea is clear with no erythema.  Neck:  trachea is midline, no palpable thyroid  Lymphatic:  No cervical lymphadenopathy  Cardiovascular:  Regular rate and rhythm, no murmurs  Thorax & Lungs:  Normal breath sounds, no rhonchi  Abdomen:  Soft, Non-tender  Skin:.  no rash  Back:  Non-tender, no CVA tenderness  Extremities:   no edema  Vascular:  symmetric radial pulse  Neurologic:  Normal gross motor    RADIOLOGY  CT-MAXILLOFACIAL W/O PLUS RECONS   Final Result         Acute mildly displaced fracture of the left orbital floor with herniation of orbital fat.      There are also fractures of the left medial and lateral maxillary wall , similar to prior exam and could be chronic.      There is blood in the left maxillary sinus.      Extensive soft tissue swelling about the left eyelid and left face.      OUTSIDE IMAGES-CT HEAD   Final Result      OUTSIDE IMAGES-CT FACE   Final Result      OUTSIDE IMAGES-CT CERVICAL SPINE   Final Result        The radiologist's interpretation of all radiological studies " have been reviewed by me.    Procedures:  Laceration Repair Procedure    Indication: Laceration    Location/Description: 1 cm laceration left upper lateral lid at the supraorbital rim.     Procedure: The patient was placed in the appropriate position and anesthesia around the laceration was obtained by infiltration using 1% Lidocaine with epinephrine. The area was then cleansed with betadine and draped in a sterile fashion. The laceration was closed with 5-0 rapid absorbing. There were no additional lacerations requiring repair. The wound area was then dressed with a sterile dressing.      Total repaired wound length: 1 cm.     Other Items: None    The patient tolerated the procedure well.    Complications: None      COURSE & MEDICAL DECISION MAKING  Pertinent Labs & Imaging studies reviewed. (See chart for details)    5:41 PM - Patient seen and examined at bedside.  Patient was made aware of his plan of care and was agreeable at this time.     8:19 PM I discussed the patient's case and the above findings with Dr. Huggins (Plastic surgery) who reported that there was nothing to be worked on his eye at this time as he is way to swollen.     8:22 PM I reevaluated the patient at bedside and informed him of his results.     8:35 PM I discussed the patient's case and the above findings with Dr. Domingo (Hospitalist) who agreed to admit the patient. Patient was made aware of the plan and was agreeable.     8:39 PM Laceration repair procedure         DISPOSITION:  Patient will be admitted to Dr. Domingo in guarded condition.      FINAL IMPRESSION  1. Alcohol withdrawal seizure without complication (HCC)    2. Closed fracture of left orbital floor, initial encounter ([facility])     laceration repair of the face 1 cm     Virgilio OLVERA (Curly), am scribing for, and in the presence of, Javed Araujo M.D..    Electronically signed by: Virgilio Saha (Curly), 6/25/2018    Javed OLVERA M.D. personally performed the  services described in this documentation, as scribed by Virgilio Saha in my presence, and it is both accurate and complete.    The note accurately reflects work and decisions made by me.  Javed Araujo  6/25/2018  9:21 PM

## 2018-06-26 NOTE — ED NOTES
Medicated, Patient resting quietly in bed without distress, denies any complaints or needs at this time.  Waiting to be seen by hospital MD

## 2018-06-27 ENCOUNTER — APPOINTMENT (OUTPATIENT)
Dept: RADIOLOGY | Facility: MEDICAL CENTER | Age: 38
DRG: 988 | End: 2018-06-27
Attending: INTERNAL MEDICINE
Payer: COMMERCIAL

## 2018-06-27 PROBLEM — E87.8 ELECTROLYTE ABNORMALITY: Status: ACTIVE | Noted: 2018-06-27

## 2018-06-27 PROBLEM — F10.931 ALCOHOL WITHDRAWAL SEIZURE WITH DELIRIUM (HCC): Status: ACTIVE | Noted: 2018-06-27

## 2018-06-27 PROBLEM — R56.9 ALCOHOL WITHDRAWAL SEIZURE WITH DELIRIUM (HCC): Status: ACTIVE | Noted: 2018-06-27

## 2018-06-27 LAB
ALBUMIN SERPL BCP-MCNC: 4.3 G/DL (ref 3.2–4.9)
ALBUMIN/GLOB SERPL: 1.7 G/DL
ALP SERPL-CCNC: 55 U/L (ref 30–99)
ALT SERPL-CCNC: 39 U/L (ref 2–50)
ANION GAP SERPL CALC-SCNC: 11 MMOL/L (ref 0–11.9)
AST SERPL-CCNC: 83 U/L (ref 12–45)
BASOPHILS # BLD AUTO: 0.1 % (ref 0–1.8)
BASOPHILS # BLD: 0.01 K/UL (ref 0–0.12)
BILIRUB SERPL-MCNC: 1.4 MG/DL (ref 0.1–1.5)
BUN SERPL-MCNC: 4 MG/DL (ref 8–22)
CALCIUM SERPL-MCNC: 9 MG/DL (ref 8.5–10.5)
CHLORIDE SERPL-SCNC: 103 MMOL/L (ref 96–112)
CO2 SERPL-SCNC: 24 MMOL/L (ref 20–33)
CREAT SERPL-MCNC: 0.5 MG/DL (ref 0.5–1.4)
EKG IMPRESSION: NORMAL
EOSINOPHIL # BLD AUTO: 0.02 K/UL (ref 0–0.51)
EOSINOPHIL NFR BLD: 0.3 % (ref 0–6.9)
ERYTHROCYTE [DISTWIDTH] IN BLOOD BY AUTOMATED COUNT: 45.8 FL (ref 35.9–50)
GLOBULIN SER CALC-MCNC: 2.5 G/DL (ref 1.9–3.5)
GLUCOSE SERPL-MCNC: 112 MG/DL (ref 65–99)
HCT VFR BLD AUTO: 37.6 % (ref 42–52)
HGB BLD-MCNC: 13.7 G/DL (ref 14–18)
IMM GRANULOCYTES # BLD AUTO: 0.04 K/UL (ref 0–0.11)
IMM GRANULOCYTES NFR BLD AUTO: 0.5 % (ref 0–0.9)
LYMPHOCYTES # BLD AUTO: 0.59 K/UL (ref 1–4.8)
LYMPHOCYTES NFR BLD: 8 % (ref 22–41)
MCH RBC QN AUTO: 34.4 PG (ref 27–33)
MCHC RBC AUTO-ENTMCNC: 36.4 G/DL (ref 33.7–35.3)
MCV RBC AUTO: 94.5 FL (ref 81.4–97.8)
MONOCYTES # BLD AUTO: 0.52 K/UL (ref 0–0.85)
MONOCYTES NFR BLD AUTO: 7 % (ref 0–13.4)
NEUTROPHILS # BLD AUTO: 6.23 K/UL (ref 1.82–7.42)
NEUTROPHILS NFR BLD: 84.1 % (ref 44–72)
NRBC # BLD AUTO: 0 K/UL
NRBC BLD-RTO: 0 /100 WBC
PLATELET # BLD AUTO: 59 K/UL (ref 164–446)
PMV BLD AUTO: 12.1 FL (ref 9–12.9)
POTASSIUM SERPL-SCNC: 3.2 MMOL/L (ref 3.6–5.5)
PROT SERPL-MCNC: 6.8 G/DL (ref 6–8.2)
RBC # BLD AUTO: 3.98 M/UL (ref 4.7–6.1)
SODIUM SERPL-SCNC: 138 MMOL/L (ref 135–145)
VIT B12 SERPL-MCNC: 608 PG/ML (ref 211–911)
WBC # BLD AUTO: 7.4 K/UL (ref 4.8–10.8)

## 2018-06-27 PROCEDURE — 700101 HCHG RX REV CODE 250: Performed by: INTERNAL MEDICINE

## 2018-06-27 PROCEDURE — 80053 COMPREHEN METABOLIC PANEL: CPT

## 2018-06-27 PROCEDURE — 700102 HCHG RX REV CODE 250 W/ 637 OVERRIDE(OP): Performed by: FAMILY MEDICINE

## 2018-06-27 PROCEDURE — 93005 ELECTROCARDIOGRAM TRACING: CPT | Performed by: FAMILY MEDICINE

## 2018-06-27 PROCEDURE — 700105 HCHG RX REV CODE 258: Performed by: INTERNAL MEDICINE

## 2018-06-27 PROCEDURE — 700102 HCHG RX REV CODE 250 W/ 637 OVERRIDE(OP): Performed by: INTERNAL MEDICINE

## 2018-06-27 PROCEDURE — 99233 SBSQ HOSP IP/OBS HIGH 50: CPT | Performed by: HOSPITALIST

## 2018-06-27 PROCEDURE — 700101 HCHG RX REV CODE 250: Performed by: FAMILY MEDICINE

## 2018-06-27 PROCEDURE — 700111 HCHG RX REV CODE 636 W/ 250 OVERRIDE (IP): Performed by: INTERNAL MEDICINE

## 2018-06-27 PROCEDURE — A9270 NON-COVERED ITEM OR SERVICE: HCPCS | Performed by: FAMILY MEDICINE

## 2018-06-27 PROCEDURE — 302136 NUTRITION PUMP: Performed by: HOSPITALIST

## 2018-06-27 PROCEDURE — 700111 HCHG RX REV CODE 636 W/ 250 OVERRIDE (IP): Performed by: FAMILY MEDICINE

## 2018-06-27 PROCEDURE — 85025 COMPLETE CBC W/AUTO DIFF WBC: CPT

## 2018-06-27 PROCEDURE — 93010 ELECTROCARDIOGRAM REPORT: CPT | Performed by: INTERNAL MEDICINE

## 2018-06-27 PROCEDURE — 99291 CRITICAL CARE FIRST HOUR: CPT | Performed by: INTERNAL MEDICINE

## 2018-06-27 PROCEDURE — 82607 VITAMIN B-12: CPT

## 2018-06-27 PROCEDURE — 770022 HCHG ROOM/CARE - ICU (200)

## 2018-06-27 PROCEDURE — 74018 RADEX ABDOMEN 1 VIEW: CPT

## 2018-06-27 PROCEDURE — A9270 NON-COVERED ITEM OR SERVICE: HCPCS | Performed by: INTERNAL MEDICINE

## 2018-06-27 RX ORDER — PHENOBARBITAL SODIUM 130 MG/ML
130 INJECTION INTRAMUSCULAR; INTRAVENOUS
Status: DISCONTINUED | OUTPATIENT
Start: 2018-06-27 | End: 2018-07-02

## 2018-06-27 RX ORDER — PHENOBARBITAL SODIUM 130 MG/ML
260 INJECTION INTRAMUSCULAR; INTRAVENOUS
Status: DISCONTINUED | OUTPATIENT
Start: 2018-06-27 | End: 2018-07-02

## 2018-06-27 RX ORDER — SODIUM CHLORIDE AND POTASSIUM CHLORIDE 150; 900 MG/100ML; MG/100ML
INJECTION, SOLUTION INTRAVENOUS CONTINUOUS
Status: DISCONTINUED | OUTPATIENT
Start: 2018-06-27 | End: 2018-06-30

## 2018-06-27 RX ORDER — AMOXICILLIN 250 MG
2 CAPSULE ORAL 2 TIMES DAILY
Status: DISCONTINUED | OUTPATIENT
Start: 2018-06-27 | End: 2018-07-06 | Stop reason: HOSPADM

## 2018-06-27 RX ORDER — TOBRAMYCIN 3 MG/ML
2 SOLUTION/ DROPS OPHTHALMIC EVERY 4 HOURS
Status: COMPLETED | OUTPATIENT
Start: 2018-06-27 | End: 2018-06-30

## 2018-06-27 RX ORDER — FOLIC ACID 1 MG/1
1 TABLET ORAL DAILY
Status: COMPLETED | OUTPATIENT
Start: 2018-06-27 | End: 2018-06-30

## 2018-06-27 RX ORDER — HYDROCODONE BITARTRATE AND ACETAMINOPHEN 5; 325 MG/1; MG/1
1 TABLET ORAL EVERY 6 HOURS PRN
Status: DISCONTINUED | OUTPATIENT
Start: 2018-06-27 | End: 2018-07-06 | Stop reason: HOSPADM

## 2018-06-27 RX ORDER — BISACODYL 10 MG
10 SUPPOSITORY, RECTAL RECTAL
Status: DISCONTINUED | OUTPATIENT
Start: 2018-06-27 | End: 2018-07-06 | Stop reason: HOSPADM

## 2018-06-27 RX ORDER — POLYETHYLENE GLYCOL 3350 17 G/17G
1 POWDER, FOR SOLUTION ORAL
Status: DISCONTINUED | OUTPATIENT
Start: 2018-06-27 | End: 2018-07-06 | Stop reason: HOSPADM

## 2018-06-27 RX ORDER — PROMETHAZINE HYDROCHLORIDE 25 MG/1
12.5-25 TABLET ORAL EVERY 4 HOURS PRN
Status: DISCONTINUED | OUTPATIENT
Start: 2018-06-27 | End: 2018-07-06 | Stop reason: HOSPADM

## 2018-06-27 RX ADMIN — STANDARDIZED SENNA CONCENTRATE AND DOCUSATE SODIUM 2 TABLET: 8.6; 5 TABLET, FILM COATED ORAL at 21:00

## 2018-06-27 RX ADMIN — POTASSIUM CHLORIDE AND SODIUM CHLORIDE: 900; 150 INJECTION, SOLUTION INTRAVENOUS at 22:46

## 2018-06-27 RX ADMIN — POTASSIUM CHLORIDE AND SODIUM CHLORIDE: 900; 150 INJECTION, SOLUTION INTRAVENOUS at 09:22

## 2018-06-27 RX ADMIN — PHENOBARBITAL SODIUM 260 MG: 130 INJECTION INTRAMUSCULAR; INTRAVENOUS at 03:30

## 2018-06-27 RX ADMIN — Medication 1 EACH: at 06:27

## 2018-06-27 RX ADMIN — TOBRAMYCIN 2 DROP: 3 SOLUTION/ DROPS OPHTHALMIC at 22:00

## 2018-06-27 RX ADMIN — TOBRAMYCIN 2 DROP: 3 SOLUTION/ DROPS OPHTHALMIC at 17:43

## 2018-06-27 RX ADMIN — TOBRAMYCIN 2 DROP: 3 SOLUTION/ DROPS OPHTHALMIC at 06:28

## 2018-06-27 RX ADMIN — PHENOBARBITAL SODIUM 130 MG: 130 INJECTION INTRAMUSCULAR; INTRAVENOUS at 08:13

## 2018-06-27 RX ADMIN — LORAZEPAM 2 MG: 2 INJECTION INTRAMUSCULAR; INTRAVENOUS at 01:48

## 2018-06-27 RX ADMIN — Medication: at 21:00

## 2018-06-27 RX ADMIN — POTASSIUM CHLORIDE AND SODIUM CHLORIDE: 900; 150 INJECTION, SOLUTION INTRAVENOUS at 16:06

## 2018-06-27 RX ADMIN — PHENOBARBITAL SODIUM 130 MG: 130 INJECTION INTRAMUSCULAR; INTRAVENOUS at 09:34

## 2018-06-27 RX ADMIN — PHENOBARBITAL SODIUM 260 MG: 130 INJECTION INTRAMUSCULAR; INTRAVENOUS at 17:04

## 2018-06-27 RX ADMIN — LORAZEPAM: 2 INJECTION INTRAMUSCULAR; INTRAVENOUS at 01:07

## 2018-06-27 RX ADMIN — TOBRAMYCIN 2 DROP: 3 SOLUTION/ DROPS OPHTHALMIC at 10:30

## 2018-06-27 RX ADMIN — PHENOBARBITAL SODIUM 260 MG: 130 INJECTION INTRAMUSCULAR; INTRAVENOUS at 02:01

## 2018-06-27 RX ADMIN — DEXMEDETOMIDINE HYDROCHLORIDE 0.3 MCG/KG/HR: 100 INJECTION, SOLUTION INTRAVENOUS at 04:40

## 2018-06-27 RX ADMIN — PHENOBARBITAL SODIUM 260 MG: 130 INJECTION INTRAMUSCULAR; INTRAVENOUS at 19:43

## 2018-06-27 RX ADMIN — PHENOBARBITAL SODIUM 130 MG: 130 INJECTION INTRAMUSCULAR; INTRAVENOUS at 19:28

## 2018-06-27 RX ADMIN — PHENOBARBITAL SODIUM 260 MG: 130 INJECTION INTRAMUSCULAR; INTRAVENOUS at 16:34

## 2018-06-27 RX ADMIN — THERA TABS 1 TABLET: TAB at 12:44

## 2018-06-27 RX ADMIN — LORAZEPAM 1.5 MG: 2 INJECTION INTRAMUSCULAR; INTRAVENOUS at 00:13

## 2018-06-27 RX ADMIN — PHENOBARBITAL SODIUM 260 MG: 130 INJECTION INTRAMUSCULAR; INTRAVENOUS at 02:31

## 2018-06-27 RX ADMIN — PHENOBARBITAL SODIUM 130 MG: 130 INJECTION INTRAMUSCULAR; INTRAVENOUS at 16:02

## 2018-06-27 RX ADMIN — PHENOBARBITAL SODIUM 260 MG: 130 INJECTION INTRAMUSCULAR; INTRAVENOUS at 03:01

## 2018-06-27 RX ADMIN — LORAZEPAM 2 MG: 2 INJECTION INTRAMUSCULAR; INTRAVENOUS at 01:33

## 2018-06-27 RX ADMIN — NICOTINE 14 MG: 14 PATCH, EXTENDED RELEASE TRANSDERMAL at 06:28

## 2018-06-27 RX ADMIN — TOBRAMYCIN 2 DROP: 3 SOLUTION/ DROPS OPHTHALMIC at 14:28

## 2018-06-27 RX ADMIN — PHENOBARBITAL SODIUM 130 MG: 130 INJECTION INTRAMUSCULAR; INTRAVENOUS at 11:15

## 2018-06-27 RX ADMIN — FOLIC ACID 1 MG: 1 TABLET ORAL at 12:44

## 2018-06-27 RX ADMIN — PHENOBARBITAL SODIUM 260 MG: 130 INJECTION INTRAMUSCULAR; INTRAVENOUS at 14:00

## 2018-06-27 RX ADMIN — THIAMINE HYDROCHLORIDE 400 MG: 100 INJECTION, SOLUTION INTRAMUSCULAR; INTRAVENOUS at 04:39

## 2018-06-27 RX ADMIN — PHENOBARBITAL SODIUM 130 MG: 130 INJECTION INTRAMUSCULAR; INTRAVENOUS at 20:51

## 2018-06-27 RX ADMIN — PHENOBARBITAL SODIUM 260 MG: 130 INJECTION INTRAMUSCULAR; INTRAVENOUS at 14:30

## 2018-06-27 ASSESSMENT — LIFESTYLE VARIABLES
HEADACHE, FULLNESS IN HEAD: NOT PRESENT
TREMOR: MODERATE TREMOR WITH ARMS EXTENDED
TOTAL SCORE: MILD ITCHING, PINS AND NEEDLES SENSATION, BURNING OR NUMBNESS
HEADACHE, FULLNESS IN HEAD: NOT PRESENT
AUDITORY DISTURBANCES: NOT PRESENT
HEADACHE, FULLNESS IN HEAD: NOT PRESENT
TOTAL SCORE: 36
TREMOR: *
VISUAL DISTURBANCES: CONTINUOUS HALLUCINATIONS
ANXIETY: MODERATELY ANXIOUS OR GUARDED, SO ANXIETY IS INFERRED
ORIENTATION AND CLOUDING OF SENSORIUM: DISORIENTED FOR PLACE AND / OR PERSON
TOTAL SCORE: 16
PAROXYSMAL SWEATS: *
TOTAL SCORE: 27
AGITATION: *
TREMOR: *
AGITATION: *
TOTAL SCORE: 37
PAROXYSMAL SWEATS: *
NAUSEA AND VOMITING: NO NAUSEA AND NO VOMITING
VISUAL DISTURBANCES: CONTINUOUS HALLUCINATIONS
AUDITORY DISTURBANCES: NOT PRESENT
ANXIETY: EQUIVALENT TO ACUTE PANIC STATES AS OCCUR IN SEVERE DELIRIUM OR ACUTE SCHIZOPHRENIC REACTIONS
ANXIETY: MODERATELY ANXIOUS OR GUARDED, SO ANXIETY IS INFERRED
PAROXYSMAL SWEATS: *
PAROXYSMAL SWEATS: *
TOTAL SCORE: 37
ORIENTATION AND CLOUDING OF SENSORIUM: DISORIENTED FOR PLACE AND / OR PERSON
NAUSEA AND VOMITING: NO NAUSEA AND NO VOMITING
TREMOR: MODERATE TREMOR WITH ARMS EXTENDED
AUDITORY DISTURBANCES: NOT PRESENT
AUDITORY DISTURBANCES: NOT PRESENT
NAUSEA AND VOMITING: NO NAUSEA AND NO VOMITING
TOTAL SCORE: 16
HEADACHE, FULLNESS IN HEAD: NOT PRESENT
AUDITORY DISTURBANCES: NOT PRESENT
VISUAL DISTURBANCES: CONTINUOUS HALLUCINATIONS
AUDITORY DISTURBANCES: NOT PRESENT
TOTAL SCORE: 37
ORIENTATION AND CLOUDING OF SENSORIUM: DISORIENTED FOR PLACE AND / OR PERSON
NAUSEA AND VOMITING: NO NAUSEA AND NO VOMITING
TREMOR: *
AGITATION: *
VISUAL DISTURBANCES: CONTINUOUS HALLUCINATIONS
TREMOR: *
TOTAL SCORE: 27
PAROXYSMAL SWEATS: BARELY PERCEPTIBLE SWEATING. PALMS MOIST
TOTAL SCORE: MILD ITCHING, PINS AND NEEDLES SENSATION, BURNING OR NUMBNESS
NAUSEA AND VOMITING: NO NAUSEA AND NO VOMITING
NAUSEA AND VOMITING: NO NAUSEA AND NO VOMITING
AUDITORY DISTURBANCES: NOT PRESENT
TREMOR: MODERATE TREMOR WITH ARMS EXTENDED
ANXIETY: EQUIVALENT TO ACUTE PANIC STATES AS OCCUR IN SEVERE DELIRIUM OR ACUTE SCHIZOPHRENIC REACTIONS
AUDITORY DISTURBANCES: NOT PRESENT
ORIENTATION AND CLOUDING OF SENSORIUM: DATE DISORIENTATION BY MORE THAN TWO CALENDAR DAYS
TOTAL SCORE: 37
AGITATION: *
AGITATION: MODERATELY FIDGETY AND RESTLESS
ORIENTATION AND CLOUDING OF SENSORIUM: DATE DISORIENTATION BY MORE THAN TWO CALENDAR DAYS
TOTAL SCORE: MILD ITCHING, PINS AND NEEDLES SENSATION, BURNING OR NUMBNESS
NAUSEA AND VOMITING: NO NAUSEA AND NO VOMITING
VISUAL DISTURBANCES: CONTINUOUS HALLUCINATIONS
ORIENTATION AND CLOUDING OF SENSORIUM: DISORIENTED FOR PLACE AND / OR PERSON
HEADACHE, FULLNESS IN HEAD: NOT PRESENT
ORIENTATION AND CLOUDING OF SENSORIUM: DATE DISORIENTATION BY NO MORE THAN TWO CALENDAR DAYS
ANXIETY: EQUIVALENT TO ACUTE PANIC STATES AS OCCUR IN SEVERE DELIRIUM OR ACUTE SCHIZOPHRENIC REACTIONS
AGITATION: *
NAUSEA AND VOMITING: NO NAUSEA AND NO VOMITING
HEADACHE, FULLNESS IN HEAD: NOT PRESENT
ORIENTATION AND CLOUDING OF SENSORIUM: DISORIENTED FOR PLACE AND / OR PERSON
AGITATION: *
NAUSEA AND VOMITING: NO NAUSEA AND NO VOMITING
PAROXYSMAL SWEATS: *
VISUAL DISTURBANCES: CONTINUOUS HALLUCINATIONS
NAUSEA AND VOMITING: NO NAUSEA AND NO VOMITING
AGITATION: MODERATELY FIDGETY AND RESTLESS
TREMOR: *
VISUAL DISTURBANCES: VERY MILD SENSITIVITY
TOTAL SCORE: MILD ITCHING, PINS AND NEEDLES SENSATION, BURNING OR NUMBNESS
ORIENTATION AND CLOUDING OF SENSORIUM: DISORIENTED FOR PLACE AND / OR PERSON
TREMOR: MODERATE TREMOR WITH ARMS EXTENDED
HEADACHE, FULLNESS IN HEAD: NOT PRESENT
AGITATION: *
PAROXYSMAL SWEATS: *
AGITATION: *
ANXIETY: EQUIVALENT TO ACUTE PANIC STATES AS OCCUR IN SEVERE DELIRIUM OR ACUTE SCHIZOPHRENIC REACTIONS
ORIENTATION AND CLOUDING OF SENSORIUM: DISORIENTED FOR PLACE AND / OR PERSON
TOTAL SCORE: 37
TREMOR: *
VISUAL DISTURBANCES: MODERATELY SEVERE HALLUCINATIONS
ORIENTATION AND CLOUDING OF SENSORIUM: DISORIENTED FOR PLACE AND / OR PERSON
PAROXYSMAL SWEATS: *
VISUAL DISTURBANCES: CONTINUOUS HALLUCINATIONS
ANXIETY: EQUIVALENT TO ACUTE PANIC STATES AS OCCUR IN SEVERE DELIRIUM OR ACUTE SCHIZOPHRENIC REACTIONS
ANXIETY: EQUIVALENT TO ACUTE PANIC STATES AS OCCUR IN SEVERE DELIRIUM OR ACUTE SCHIZOPHRENIC REACTIONS
VISUAL DISTURBANCES: VERY MILD SENSITIVITY
HEADACHE, FULLNESS IN HEAD: NOT PRESENT
NAUSEA AND VOMITING: NO NAUSEA AND NO VOMITING
TOTAL SCORE: 37
HEADACHE, FULLNESS IN HEAD: NOT PRESENT
ANXIETY: EQUIVALENT TO ACUTE PANIC STATES AS OCCUR IN SEVERE DELIRIUM OR ACUTE SCHIZOPHRENIC REACTIONS
HEADACHE, FULLNESS IN HEAD: NOT PRESENT
VISUAL DISTURBANCES: EXTREMELY SEVERE HALLUCINATIONS
ANXIETY: EQUIVALENT TO ACUTE PANIC STATES AS OCCUR IN SEVERE DELIRIUM OR ACUTE SCHIZOPHRENIC REACTIONS
ANXIETY: EQUIVALENT TO ACUTE PANIC STATES AS OCCUR IN SEVERE DELIRIUM OR ACUTE SCHIZOPHRENIC REACTIONS
TREMOR: *
TOTAL SCORE: MILD ITCHING, PINS AND NEEDLES SENSATION, BURNING OR NUMBNESS
PAROXYSMAL SWEATS: *
AUDITORY DISTURBANCES: NOT PRESENT
TOTAL SCORE: 37
NAUSEA AND VOMITING: NO NAUSEA AND NO VOMITING
AGITATION: *
TOTAL SCORE: MILD ITCHING, PINS AND NEEDLES SENSATION, BURNING OR NUMBNESS
HEADACHE, FULLNESS IN HEAD: NOT PRESENT
AUDITORY DISTURBANCES: NOT PRESENT
HEADACHE, FULLNESS IN HEAD: NOT PRESENT
PAROXYSMAL SWEATS: BARELY PERCEPTIBLE SWEATING. PALMS MOIST
AUDITORY DISTURBANCES: NOT PRESENT
VISUAL DISTURBANCES: MODERATELY SEVERE HALLUCINATIONS
AUDITORY DISTURBANCES: NOT PRESENT
AGITATION: *
TOTAL SCORE: MILD ITCHING, PINS AND NEEDLES SENSATION, BURNING OR NUMBNESS
ORIENTATION AND CLOUDING OF SENSORIUM: DATE DISORIENTATION BY NO MORE THAN TWO CALENDAR DAYS
TREMOR: *
TOTAL SCORE: MILD ITCHING, PINS AND NEEDLES SENSATION, BURNING OR NUMBNESS
PAROXYSMAL SWEATS: *
PAROXYSMAL SWEATS: *
ANXIETY: EQUIVALENT TO ACUTE PANIC STATES AS OCCUR IN SEVERE DELIRIUM OR ACUTE SCHIZOPHRENIC REACTIONS

## 2018-06-27 ASSESSMENT — COGNITIVE AND FUNCTIONAL STATUS - GENERAL
EATING MEALS: TOTAL
MOBILITY SCORE: 8
STANDING UP FROM CHAIR USING ARMS: TOTAL
TURNING FROM BACK TO SIDE WHILE IN FLAT BAD: A LITTLE
HELP NEEDED FOR BATHING: TOTAL
DRESSING REGULAR UPPER BODY CLOTHING: TOTAL
CLIMB 3 TO 5 STEPS WITH RAILING: TOTAL
WALKING IN HOSPITAL ROOM: TOTAL
PERSONAL GROOMING: TOTAL
MOVING FROM LYING ON BACK TO SITTING ON SIDE OF FLAT BED: UNABLE
SUGGESTED CMS G CODE MODIFIER DAILY ACTIVITY: CN
SUGGESTED CMS G CODE MODIFIER MOBILITY: CM
MOVING TO AND FROM BED TO CHAIR: UNABLE
DRESSING REGULAR LOWER BODY CLOTHING: TOTAL
TOILETING: TOTAL
DAILY ACTIVITIY SCORE: 6

## 2018-06-27 ASSESSMENT — PAIN SCALES - GENERAL
PAINLEVEL_OUTOF10: 0

## 2018-06-27 NOTE — PROGRESS NOTES
Bedside report received from night RN.  Pt assessed, unable to assess orientation, pt mumbles incoherently,Rass +2 no sob noted.  Hard restraints on, verified order. Security notified to assist with repositining and ROM. ROM performed one at a time as restraints were removed, skin abrasion noted on right left.  Padding placed under all restraint points.     Bed alarm on, bed in lowest position, call light within reach.  Will continue to monitor

## 2018-06-27 NOTE — PROGRESS NOTES
Cortrak Placement    Tube Team verified patient name and medical record number prior to tube placement.  Cortrak tube (43 inches, 10 Tristanian) placed at 95 cm in right nare.  Per Cortrak picture, tube appears to be in the small bowel.  Nursing Instructions: Awaiting KUB to confirm placement before use for medications or feeding. Once placement confirmed, flush tube with 30 ml of water, and then remove and save stylet, in patient medication drawer.

## 2018-06-27 NOTE — PROGRESS NOTES
Pt has multiple abrasions and on face arms and legs.  2 RN skin assessment done with ai shields.  See wound documentation.

## 2018-06-27 NOTE — PROGRESS NOTES
[facility] SSD[facility] Progress Note    Date of Service: 2018    Chief Complaint  37 y.o. male admitted 2018 with alcohol withdrawal seizure, facial trauma.    Interval Problem Update  Alc withdrawal -no recurrence of seizure, CIWA scores remain high, was seen earlier today was quite oriented, per RN has become more agitated  Facial trauma - CT showed Acute mildly displaced fracture of the left orbital floor with herniation of orbital fat.    Consultants/Specialty  Plastic Surgery - [place]    Disposition  TBD        Review of Systems   Constitutional: Negative for chills, fever and malaise/fatigue.   HENT: Negative for hearing loss and sore throat.    Eyes: Negative for blurred vision and double vision.   Respiratory: Negative for cough, shortness of breath and wheezing.    Cardiovascular: Negative for chest pain and leg swelling.   Gastrointestinal: Negative for abdominal pain, diarrhea, heartburn, nausea and vomiting.   Genitourinary: Negative for dysuria.   Musculoskeletal: Negative for back pain and neck pain.   Skin: Negative for rash.   Neurological: Negative for dizziness, sensory change, speech change, focal weakness, weakness and headaches.   Psychiatric/Behavioral: The patient is not nervous/anxious.       Physical Exam  Laboratory/Imaging   Hemodynamics  Temp (24hrs), Av.9 °C (98.5 °F), Min:36.4 °C (97.6 °F), Max:37.2 °C (98.9 °F)   Temperature: 37.2 °C (98.9 °F)  Pulse  Av.9  Min: 86  Max: 100 Heart Rate (Monitored): 87  Blood Pressure: 121/94, NIBP: 116/74      Respiratory      Respiration: 18, Pulse Oximetry: 95 %             Fluids    Intake/Output Summary (Last 24 hours) at 18 4308  Last data filed at 18 0025   Gross per 24 hour   Intake              300 ml   Output                0 ml   Net              300 ml       Nutrition  Orders Placed This Encounter   Procedures   • Diet Order Regular     Standing Status:   Standing     Number of Occurrences:   1     Order Specific  Question:   Diet:     Answer:   Regular [1]     Physical Exam   Constitutional: He is oriented to person, place, and time. He appears well-developed and well-nourished.   HENT:   Left facial swelling and hematoma   Eyes:   Left periorbital swelling/hematoma   Neck: No tracheal deviation present. No thyromegaly present.   Cardiovascular: Normal rate and regular rhythm.    Pulmonary/Chest: Effort normal and breath sounds normal.   Abdominal: Soft. Bowel sounds are normal. He exhibits no distension. There is no tenderness.   Musculoskeletal: He exhibits no edema.   Lymphadenopathy:     He has no cervical adenopathy.   Neurological: He is alert and oriented to person, place, and time. No cranial nerve deficit.   MMT 5/5   Skin: Skin is warm and dry.   Nursing note and vitals reviewed.      Recent Labs      06/25/18 1935   WBC  9.6   RBC  4.24*   HEMOGLOBIN  14.0   HEMATOCRIT  40.4*   MCV  95.3   MCH  33.0   MCHC  34.7   RDW  48.6   PLATELETCT  70*   MPV  11.0     Recent Labs      06/25/18 1935   SODIUM  137   POTASSIUM  3.8   CHLORIDE  99   CO2  23   GLUCOSE  82   BUN  6*   CREATININE  0.62   CALCIUM  8.9     Recent Labs      06/25/18 1935   INR  1.10                  Assessment/Plan     * Delirium tremens (HCC)- (present on admission)   Assessment & Plan    [facility] protocol   Add Depakote and Neurontin        Facial trauma- (present on admission)   Assessment & Plan    Follow-up with plastic surgery as outpatient          Alcohol abuse- (present on admission)   Assessment & Plan    Counseling        Thrombocytopenia (HCC)- (present on admission)   Assessment & Plan    Follow CBC        Tobacco use- (present on admission)   Assessment & Plan    Nicotine replacement          Quality-Core Measures   Reviewed items::  Medications reviewed, Labs reviewed and Radiology images reviewed  Joshua catheter::  No Joshua  DVT prophylaxis pharmacological::  Not indicated at this time, ambulatory  Ulcer Prophylaxis::  No
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
## 2018-06-27 NOTE — PROGRESS NOTES
Able to assess left eye after 20 minutes of cleaning.  Pupil  Appears nonreactive to light at 3mm, round.  Large floating blood clot or hematoma around eye itself.  Dr Martins notified
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
## 2018-06-27 NOTE — PROGRESS NOTES
Patient extremely agitated, trying to take out IV, security had to be called for assistance.  Dr. Norwood was on the floor and gave permission to do an early CIWA.  Patient scored significantly higher.  1mg of IV ativan given.  Patient is not oriented and cannot leave AMA.

## 2018-06-27 NOTE — PROGRESS NOTES
Pt arrived on floor in four point violent restraints.  Security called to remove violent restraints and soft restraints applied due to pulling at lines still.

## 2018-06-27 NOTE — PROGRESS NOTES
"Pt awake and agitated, PRN medication given.  Pt orientated to self only, states \"has a gun and a knife in his pocket\".  Pt currently wearing hospital clothing.  Will contact security to inspect patients belongings  "

## 2018-06-27 NOTE — PROGRESS NOTES
Monitor check    Pt tachycardic 110-150  RI- 0.16  QRS- 0.08  QT- 0.30    Pt very combative overnight

## 2018-06-27 NOTE — PROGRESS NOTES
[facility] Hospitalist Progress Note    Date of Service: 2018    Chief Complaint  37 y.o. male admitted 2018 with alcohol withdrawal seizure, facial trauma.    Interval Problem Update  Agitated overnight transferred to ICU  On precedex 0.4 Ns+20Kcl  Hx of chronic back pain  SBP 90's  Tmax 37.7  2l NC  Plts 59k       Consultants/Specialty  Plastic Surgery - [place]  Ophthalmology    Disposition  TBD        Review of Systems   Unable to perform ROS: Mental status change      Physical Exam  Laboratory/Imaging   Hemodynamics  Temp (24hrs), Av.9 °C (98.5 °F), Min:36.6 °C (97.8 °F), Max:37.2 °C (98.9 °F)   Temperature: 37 °C (98.6 °F), Monitored Temp: 37 °C (98.6 °F)  Pulse  Av.3  Min: 42  Max: 177 Heart Rate (Monitored): 87  Blood Pressure: 158/104, NIBP: (!) 99/59      Respiratory      Respiration: 14, Pulse Oximetry: 99 %             Fluids    Intake/Output Summary (Last 24 hours) at 18 0913  Last data filed at 18 0800   Gross per 24 hour   Intake             1580 ml   Output             4185 ml   Net            -2605 ml       Nutrition  Orders Placed This Encounter   Procedures   • Diet Order Clear Liquid     Standing Status:   Standing     Number of Occurrences:   1     Order Specific Question:   Diet:     Answer:   Clear Liquid [10]     Physical Exam   Constitutional: He appears well-developed and well-nourished.   HENT:   Head: Normocephalic.   Left facial swelling and periorbital edema     Eyes:   Left periorbital swelling and hematoma  Left pupil 4 mm and non reactive     Neck: Neck supple. No tracheal deviation present. No thyromegaly present.   Cardiovascular: Normal rate and regular rhythm.    No murmur heard.  Pulmonary/Chest: Effort normal. No stridor. He has no wheezes. He has rhonchi. He exhibits no tenderness.   Abdominal: Soft. Bowel sounds are normal. He exhibits no distension. There is no tenderness. There is no rebound.   Musculoskeletal: He exhibits no edema.   Neurological:  No cranial nerve deficit.   Lethargic  Does not follow command  Moves all extremities spontaneously   Skin: Skin is warm and dry. He is not diaphoretic. No erythema.   Psychiatric: Cognition and memory are impaired. He expresses impulsivity. He is noncommunicative.   Nursing note and vitals reviewed.      Recent Labs      06/25/18 1935 06/27/18   0620   WBC  9.6  7.4   RBC  4.24*  3.98*   HEMOGLOBIN  14.0  13.7*   HEMATOCRIT  40.4*  37.6*   MCV  95.3  94.5   MCH  33.0  34.4*   MCHC  34.7  36.4*   RDW  48.6  45.8   PLATELETCT  70*  59*   MPV  11.0  12.1     Recent Labs      06/25/18 1935 06/27/18   0620   SODIUM  137  138   POTASSIUM  3.8  3.2*   CHLORIDE  99  103   CO2  23  24   GLUCOSE  82  112*   BUN  6*  4*   CREATININE  0.62  0.50   CALCIUM  8.9  9.0     Recent Labs      06/25/18 1935   INR  1.10                  Assessment/Plan     * Delirium tremens (HCC)- (present on admission)   Assessment & Plan    Transfered to ICU for persistent agitation  On phenobarbital protocol  Continue thiamine and folic acid          Facial trauma- (present on admission)   Assessment & Plan    With orbital fracture   Dr. Terrazas recommended outpatient follow-up  Ophthalmology consultation  Continue close clinical monitoring  Continue topical antibiotics          Alcohol abuse- (present on admission)   Assessment & Plan     on cessation when more alert        Thrombocytopenia (HCC)- (present on admission)   Assessment & Plan    Likely secondary to alcoholism  Platelet counts 59 continue to monitor        Electrolyte abnormality   Assessment & Plan    Hypokalemia    Replete and monitor  Check phosphorus and magnesium        Alcohol withdrawal seizure with delirium (HCC)   Assessment & Plan    Continue phenobarbital protocol          Tobacco use- (present on admission)   Assessment & Plan    Nicotine replacement          Quality-Core Measures   Reviewed items::  Medications reviewed, Labs reviewed and Radiology images  reviewed  Joshua catheter::  No Joshua  DVT prophylaxis - mechanical:  SCDs  Ulcer Prophylaxis::  No      Plan of care discussed with Dr. Martins, nursing staff and pharmacist.

## 2018-06-27 NOTE — DIETARY
"Nutrition Support Assessment     Day 2 of admit.  Denilson Chen is a 37 y.o. male with admitting DX of DTs     Current problem list:  1. Pt had alcohol withdrawal seizure PTA  2. Increased CIWA scores, in restraints  3. Pt unsafe for PO diet.  4. H/o ETOH abuse and smoking     Assessment:  Estimated Nutritional Needs based on:   Height: 180.3 cm (5' 11\")  Weight: 89.5 kg (197 lb 5 oz)  Ideal Body Weight: 78 kg (172 lb)  Percent Ideal Body Weight: 114.7  Body mass index is 27.52 kg/m².     Calculation/Equation: REE per MSJ x1.1-1.2 = 3033-7349 kcal/day  Total Calories / day:  - 2200 (Calories / k - 25)  Total Grams Protein / day: 90 - 107 (Grams Protein / k - 1.2)     Evaluation:   1. Pt unsafe for PO diet secondary to ETOH withdrawal.  2. Cortrak placed this morning for TF to start.  3. Labs: K+ 3.2, glu 112, BUN 4, AST 83  4. Meds: NaCl KCl @ 150 ml/hr, precedex, phenobarbital, MVI, folic acid, bowel meds, thiamine  5. Standard TF formula is appropriate to meet pt's needs.  6. Pt @ risk for refeeding syndrome secondary to ETOH abuse.      Malnutrition Risk: Poor PO intake and weight loss addressed by RD on .     Recommendations/Plan:  1. Start Fibersource HN @ 25 ml/hr and advance per protocol to goal rate 70 ml/hr to provide 2016 kcal, 91 grams protein, and 1361 ml free water per day.  2. Fluids per MD.  3. Monitor for refeeding syndrome.  4. PO diet when safe and appropriate.    RD following.           "
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
## 2018-06-27 NOTE — CONSULTS
DATE OF SERVICE:  06/27/2018    CRITICAL CARE CONSULTATION    Patient seen in [place] [address] at the request of Jose Alfredo Domingo MD.  Case   discussed with nursing at bedside as well as charge nurse.    CHIEF COMPLAINT:  Agitation related to alcohol withdrawal syndrome.    HISTORY OF PRESENT ILLNESS:  This is a 37-year-old male who had significant   facial trauma after what appeared to be a fall and a fracture to his left   infraorbital region.  The patient was at outside [facility] where he was   noted to have a previous injury to the left mandible and did have a left   orbital fracture in the inferior wall with depressed fragments and   opacification of the left maxillary sinus.  There was no obvious entrapment of   the inferior rectus muscle.    Since the patient was initially seen and evaluated, he was then transferred to   our institution for higher level of care.  He also had CT imaging of the   cervical spine and the head that was read as negative.  The patient had loss   of consciousness after the fall and continued to have abrasions and   significant ecchymosis around his neck, face, knees bilaterally as he likely   fell prone.  The patient was amnestic to the event.  He got an initial GCS of   14.    The patient once upon transfer was admitted on 06/25/2018 with while being at   the [facility] had had a seizure activity.  He was there because he had a   speeding ticket and was trying to fight this.  He also had withdrawal of his   last drink yesterday.  Patient had a report of a second seizure and had been   drinking 6-8 beers on a daily basis, but quit approximately 1-1/2 days ago.    Patient was seen there in the hospital and again the fracture and the surgical   workup ensued.    The patient has become more agitated and Dr. Domingo asked me to see the   patient for possible Precedex infusion versus other ongoing treatment, though   he was switched to the CIWA protocol via the Precedex infusion  institution,   which the patient appears to require as he is in 4-point vinyl restraints.    The patient is confused and agitated.  He has 1 peripheral IV.     ALLERGIES:  None.      MEDICATIONS:    His medications are not listed as outpatient.    PAST MEDICAL HISTORY:  Includes alcoholism and previous falls with fracture   dislocation.    PAST SURGICAL HISTORY:  Includes ORIF of the mandibular fracture in 2011 as   well as nasal fracture with reduction and a laceration repair.    SOCIAL HISTORY:  He is an everyday smoker as well as six 12-ounce beers daily.    FAMILY HISTORY:  Unknown.    REVIEW OF SYSTEMS:  Unobtainable due to his agitated status, but reviewed in   medical records.    PHYSICAL EXAMINATION:  VITAL SIGNS:  On evaluation in the 6th floor on [address], heart rate was up   in the 150s.  Current blood pressure recorded at 130s/90s and saturations are   97%, respirations were anywhere between 16-21.  HEAD AND NECK:  His head shows signs of trauma including exudate of the left   eye likely consistent with the swelling in addition to probable mild   conjunctivitis.  I was able to visualize the pupil, though there was edema of   the left side greater than right that limited his ocular exam.  He has got   abrasions as well as erythematous changes around his left anterior neck.  He   had full range of motion of the neck.  His trachea was midline.  He was able   to phonate.  The patient's left arm was in the upright position and in vinyl   restraints.  HEART:  Tachycardic.  S1, S2 are prominent.  No obvious murmur, rub or gallop.  LUNGS:  Revealed breath sounds equally anterolaterally without wheezing or   rhonchi.  ABDOMEN:  Soft, nontender, no hepatosplenomegaly, no rebound, rigidity or   guarding.  EXTREMITIES:  Do show also evidence of multiple abrasions, lacerations that   were in different levels of healing of his anterior knees and thighs as well   as feet.  He had full pulses at dorsalis pedis and  posterior tibialis.  Good   cap refill of upper and lower extremities noted as well.  NEUROLOGIC:  He was agitated.  He had positive clonus.  He had hyperreflexia.    He was uncooperative for other testing since he was in 3-point vinyl   restraint.  He had just received a second dose of phenobarbital as well.    LABORATORY DATA:  Tonight, his white cell count 9.6, H and H is 14 and 40.4   with a platelet count of only 70.  His chemistry showed a sodium of 137,   potassium 3.8, chloride 99, CO2 of 23, glucose of 82 with a BUN of 6,   creatinine 1.62, AST of 91, ALT of 42, ALP of 61, T bili of 2.0, albumin of   4.3.  Coagulation factors, INR of 1.10 with PT of 13.9 and TSH was 2.36.    IMAGING:  CTA maxillofacial from the outlying institution showed acute mildly   displaced fracture of the left orbital floor with herniation of orbital fat   and fractures of the left medial and lateral maxillary wall similar to a   previous exam, which may be chronic.  Blood in the maxillary sinus also is   seen and extensive soft tissue swelling of the left eyelid and left face is   still seen.  That is as of 06/25/2018 at 19:52.  The patient's ECG also   reviewed personally.  This is my interpretation.    There is sinus rhythm with a rate of 86, AR interval of 140, QRS duration of   100, QTc of 4.3, axis of negative 20 degrees.  There are T-wave inversions in   lead III, aVF with poor placement versus mild QRS waves in lead III and aVF.    No obvious other ischemic changes are noted.    ASSESSMENT:  1.  Status post fall, likely from an alcohol withdrawal-related seizure.    Patient had facial trauma with left infraorbital fracture.  He likely has old   maxillary fracture that has been repaired as there is hardware noted.  2.  Mild bacterial conjunctivitis on the left.  3.  Multiple areas of facial trauma as well as abrasions and lacerations of   his face and lower extremities from previous fall.  4.  Alcoholism.  Patient does have  alcohol withdrawal syndrome and has failed   Ativan as well as doses of phenobarbital pushes.  5.  Thrombocytopenia likely related to alcoholic toxic effect on the bone   marrow.  6.  Mild elevation in AST, ALT ratio consistent with alcohol abuse.  7.  Elevated T bili, which is only mild.  8.  History of smoking abuse.    PLAN:  1.  Patient will be placed on Precedex infusion.  I would also give high-dose   thiamine as he may have signs of Wernicke's encephalopathy based on his   current exam, a total of 500 mg to be given once.  2.  Followup chest x-ray to rule out aspiration pneumonia, we will start   tomorrow.  3.  We will start ocular drops for possible infection of the left eye.  4.  Outpatient surgical management for entrapment of his left orbital floor,   which may cause diplopia.  5.  Follow typical lab values including CMP and add lipase with tomorrow's   labs.  6.  Continue with modified Claudio protocol with gabapentin and valproic acid   if able to take oral medications although he may not be able to at this time.  7.  Seizure precautions and fall precautions.    Patient remains critically ill.      Patient's critical care time so far is 45 minutes not including procedures.       ____________________________________     Kaushal Lawler, DO PRABHAKAR / N[facility]    DD:  06/27/2018 03:58:07  DT:  06/27/2018 04:43:42    D#:  [id number]  Job#:  [id number]

## 2018-06-27 NOTE — PROGRESS NOTES
Pt becoming belligerent and becoming verbally aggressive to RN.  Attempting to fight restraints.  Medicated with PRN Phenobarbital
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
## 2018-06-27 NOTE — PROGRESS NOTES
Family lives in [place], Brother Bruce and Mom will pick patient up when he is stable and medically clear to be discharged.     Bruce: [phone]  Mom: [phone]     Please call them with updates to when the pt will be leaving.

## 2018-06-27 NOTE — PROGRESS NOTES
Dr. Burr paged in regards to pt being in leather restraints. MD stated that Dr. Domingo would be up to see pt. Paged Dr. Domingo.

## 2018-06-28 ENCOUNTER — APPOINTMENT (OUTPATIENT)
Dept: RADIOLOGY | Facility: MEDICAL CENTER | Age: 38
DRG: 988 | End: 2018-06-28
Attending: INTERNAL MEDICINE
Payer: COMMERCIAL

## 2018-06-28 LAB
ALBUMIN SERPL BCP-MCNC: 3.8 G/DL (ref 3.2–4.9)
ALBUMIN/GLOB SERPL: 1.6 G/DL
ALP SERPL-CCNC: 54 U/L (ref 30–99)
ALT SERPL-CCNC: 35 U/L (ref 2–50)
ANION GAP SERPL CALC-SCNC: 8 MMOL/L (ref 0–11.9)
AST SERPL-CCNC: 66 U/L (ref 12–45)
BASOPHILS # BLD AUTO: 0 % (ref 0–1.8)
BASOPHILS # BLD: 0 K/UL (ref 0–0.12)
BILIRUB SERPL-MCNC: 1.1 MG/DL (ref 0.1–1.5)
BUN SERPL-MCNC: 3 MG/DL (ref 8–22)
CALCIUM SERPL-MCNC: 8.6 MG/DL (ref 8.5–10.5)
CHLORIDE SERPL-SCNC: 103 MMOL/L (ref 96–112)
CO2 SERPL-SCNC: 27 MMOL/L (ref 20–33)
CREAT SERPL-MCNC: 0.65 MG/DL (ref 0.5–1.4)
EOSINOPHIL # BLD AUTO: 0.07 K/UL (ref 0–0.51)
EOSINOPHIL NFR BLD: 0.9 % (ref 0–6.9)
ERYTHROCYTE [DISTWIDTH] IN BLOOD BY AUTOMATED COUNT: 47.5 FL (ref 35.9–50)
GLOBULIN SER CALC-MCNC: 2.4 G/DL (ref 1.9–3.5)
GLUCOSE SERPL-MCNC: 90 MG/DL (ref 65–99)
HCT VFR BLD AUTO: 35.7 % (ref 42–52)
HGB BLD-MCNC: 12.6 G/DL (ref 14–18)
IMM GRANULOCYTES # BLD AUTO: 0.03 K/UL (ref 0–0.11)
IMM GRANULOCYTES NFR BLD AUTO: 0.4 % (ref 0–0.9)
LIPASE SERPL-CCNC: 28 U/L (ref 11–82)
LYMPHOCYTES # BLD AUTO: 0.95 K/UL (ref 1–4.8)
LYMPHOCYTES NFR BLD: 13 % (ref 22–41)
MAGNESIUM SERPL-MCNC: 1.6 MG/DL (ref 1.5–2.5)
MANUAL DIFF BLD: NORMAL
MCH RBC QN AUTO: 33.9 PG (ref 27–33)
MCHC RBC AUTO-ENTMCNC: 35.3 G/DL (ref 33.7–35.3)
MCV RBC AUTO: 96 FL (ref 81.4–97.8)
MONOCYTES # BLD AUTO: 0.26 K/UL (ref 0–0.85)
MONOCYTES NFR BLD AUTO: 3.5 % (ref 0–13.4)
MORPHOLOGY BLD-IMP: NORMAL
NEUTROPHILS # BLD AUTO: 6.03 K/UL (ref 1.82–7.42)
NEUTROPHILS NFR BLD: 82.6 % (ref 44–72)
NRBC # BLD AUTO: 0 K/UL
NRBC BLD-RTO: 0 /100 WBC
PHOSPHATE SERPL-MCNC: 3.1 MG/DL (ref 2.5–4.5)
PLATELET # BLD AUTO: 58 K/UL (ref 164–446)
PLATELET BLD QL SMEAR: NORMAL
PLATELETS.RETICULATED NFR BLD AUTO: 14.7 K/UL (ref 0.6–13.1)
PMV BLD AUTO: 10.9 FL (ref 9–12.9)
POTASSIUM SERPL-SCNC: 3.5 MMOL/L (ref 3.6–5.5)
PROT SERPL-MCNC: 6.2 G/DL (ref 6–8.2)
RBC # BLD AUTO: 3.72 M/UL (ref 4.7–6.1)
RBC BLD AUTO: NORMAL
SODIUM SERPL-SCNC: 138 MMOL/L (ref 135–145)
WBC # BLD AUTO: 7.3 K/UL (ref 4.8–10.8)

## 2018-06-28 PROCEDURE — 93010 ELECTROCARDIOGRAM REPORT: CPT | Mod: 76 | Performed by: INTERNAL MEDICINE

## 2018-06-28 PROCEDURE — 700111 HCHG RX REV CODE 636 W/ 250 OVERRIDE (IP): Performed by: INTERNAL MEDICINE

## 2018-06-28 PROCEDURE — 99291 CRITICAL CARE FIRST HOUR: CPT | Performed by: INTERNAL MEDICINE

## 2018-06-28 PROCEDURE — 80053 COMPREHEN METABOLIC PANEL: CPT

## 2018-06-28 PROCEDURE — 83735 ASSAY OF MAGNESIUM: CPT

## 2018-06-28 PROCEDURE — 85055 RETICULATED PLATELET ASSAY: CPT

## 2018-06-28 PROCEDURE — 700105 HCHG RX REV CODE 258: Performed by: INTERNAL MEDICINE

## 2018-06-28 PROCEDURE — 700101 HCHG RX REV CODE 250: Performed by: FAMILY MEDICINE

## 2018-06-28 PROCEDURE — 85027 COMPLETE CBC AUTOMATED: CPT

## 2018-06-28 PROCEDURE — A9270 NON-COVERED ITEM OR SERVICE: HCPCS | Performed by: FAMILY MEDICINE

## 2018-06-28 PROCEDURE — 99233 SBSQ HOSP IP/OBS HIGH 50: CPT | Performed by: HOSPITALIST

## 2018-06-28 PROCEDURE — 700102 HCHG RX REV CODE 250 W/ 637 OVERRIDE(OP): Performed by: HOSPITALIST

## 2018-06-28 PROCEDURE — 770022 HCHG ROOM/CARE - ICU (200)

## 2018-06-28 PROCEDURE — A9270 NON-COVERED ITEM OR SERVICE: HCPCS | Performed by: HOSPITALIST

## 2018-06-28 PROCEDURE — 700102 HCHG RX REV CODE 250 W/ 637 OVERRIDE(OP): Performed by: INTERNAL MEDICINE

## 2018-06-28 PROCEDURE — 700101 HCHG RX REV CODE 250: Performed by: HOSPITALIST

## 2018-06-28 PROCEDURE — 71045 X-RAY EXAM CHEST 1 VIEW: CPT

## 2018-06-28 PROCEDURE — 700102 HCHG RX REV CODE 250 W/ 637 OVERRIDE(OP): Performed by: FAMILY MEDICINE

## 2018-06-28 PROCEDURE — A9270 NON-COVERED ITEM OR SERVICE: HCPCS | Performed by: INTERNAL MEDICINE

## 2018-06-28 PROCEDURE — 700111 HCHG RX REV CODE 636 W/ 250 OVERRIDE (IP): Performed by: HOSPITALIST

## 2018-06-28 PROCEDURE — 84100 ASSAY OF PHOSPHORUS: CPT

## 2018-06-28 PROCEDURE — 700101 HCHG RX REV CODE 250: Performed by: INTERNAL MEDICINE

## 2018-06-28 PROCEDURE — 93005 ELECTROCARDIOGRAM TRACING: CPT | Performed by: FAMILY MEDICINE

## 2018-06-28 PROCEDURE — 83690 ASSAY OF LIPASE: CPT

## 2018-06-28 PROCEDURE — 85007 BL SMEAR W/DIFF WBC COUNT: CPT

## 2018-06-28 RX ORDER — MAGNESIUM SULFATE HEPTAHYDRATE 40 MG/ML
4 INJECTION, SOLUTION INTRAVENOUS ONCE
Status: COMPLETED | OUTPATIENT
Start: 2018-06-28 | End: 2018-06-28

## 2018-06-28 RX ADMIN — PHENOBARBITAL SODIUM 260 MG: 130 INJECTION INTRAMUSCULAR; INTRAVENOUS at 07:45

## 2018-06-28 RX ADMIN — TOBRAMYCIN 2 DROP: 3 SOLUTION/ DROPS OPHTHALMIC at 05:10

## 2018-06-28 RX ADMIN — POTASSIUM CHLORIDE AND SODIUM CHLORIDE: 900; 150 INJECTION, SOLUTION INTRAVENOUS at 16:19

## 2018-06-28 RX ADMIN — TOBRAMYCIN 2 DROP: 3 SOLUTION/ DROPS OPHTHALMIC at 01:17

## 2018-06-28 RX ADMIN — Medication 1 EACH: at 09:44

## 2018-06-28 RX ADMIN — PHENOBARBITAL SODIUM 260 MG: 130 INJECTION INTRAMUSCULAR; INTRAVENOUS at 09:44

## 2018-06-28 RX ADMIN — THIAMINE HYDROCHLORIDE 400 MG: 100 INJECTION, SOLUTION INTRAMUSCULAR; INTRAVENOUS at 09:00

## 2018-06-28 RX ADMIN — STANDARDIZED SENNA CONCENTRATE AND DOCUSATE SODIUM 2 TABLET: 8.6; 5 TABLET, FILM COATED ORAL at 07:56

## 2018-06-28 RX ADMIN — PHENOBARBITAL SODIUM 260 MG: 130 INJECTION INTRAMUSCULAR; INTRAVENOUS at 17:44

## 2018-06-28 RX ADMIN — PHENOBARBITAL SODIUM 260 MG: 130 INJECTION INTRAMUSCULAR; INTRAVENOUS at 14:18

## 2018-06-28 RX ADMIN — PHENOBARBITAL SODIUM 260 MG: 130 INJECTION INTRAMUSCULAR; INTRAVENOUS at 16:19

## 2018-06-28 RX ADMIN — TOBRAMYCIN 2 DROP: 3 SOLUTION/ DROPS OPHTHALMIC at 09:44

## 2018-06-28 RX ADMIN — THERA TABS 1 TABLET: TAB at 07:56

## 2018-06-28 RX ADMIN — POTASSIUM BICARBONATE 50 MEQ: 25 TABLET, EFFERVESCENT ORAL at 10:11

## 2018-06-28 RX ADMIN — PHENOBARBITAL SODIUM 130 MG: 130 INJECTION INTRAMUSCULAR; INTRAVENOUS at 01:14

## 2018-06-28 RX ADMIN — POTASSIUM CHLORIDE AND SODIUM CHLORIDE: 900; 150 INJECTION, SOLUTION INTRAVENOUS at 05:28

## 2018-06-28 RX ADMIN — PHENOBARBITAL SODIUM 260 MG: 130 INJECTION INTRAMUSCULAR; INTRAVENOUS at 03:39

## 2018-06-28 RX ADMIN — TOBRAMYCIN 2 DROP: 3 SOLUTION/ DROPS OPHTHALMIC at 17:44

## 2018-06-28 RX ADMIN — PHENOBARBITAL SODIUM 260 MG: 130 INJECTION INTRAMUSCULAR; INTRAVENOUS at 02:53

## 2018-06-28 RX ADMIN — FENTANYL CITRATE 50 MCG: 50 INJECTION, SOLUTION INTRAMUSCULAR; INTRAVENOUS at 10:07

## 2018-06-28 RX ADMIN — TOBRAMYCIN 2 DROP: 3 SOLUTION/ DROPS OPHTHALMIC at 20:20

## 2018-06-28 RX ADMIN — TOBRAMYCIN 2 DROP: 3 SOLUTION/ DROPS OPHTHALMIC at 13:00

## 2018-06-28 RX ADMIN — MAGNESIUM SULFATE IN WATER 4 G: 40 INJECTION, SOLUTION INTRAVENOUS at 09:45

## 2018-06-28 RX ADMIN — FOLIC ACID 1 MG: 1 TABLET ORAL at 07:56

## 2018-06-28 RX ADMIN — PHENOBARBITAL SODIUM 130 MG: 130 INJECTION INTRAMUSCULAR; INTRAVENOUS at 12:10

## 2018-06-28 RX ADMIN — PHENOBARBITAL SODIUM 260 MG: 130 INJECTION INTRAMUSCULAR; INTRAVENOUS at 20:00

## 2018-06-28 RX ADMIN — PHENOBARBITAL SODIUM 260 MG: 130 INJECTION INTRAMUSCULAR; INTRAVENOUS at 00:23

## 2018-06-28 RX ADMIN — NICOTINE 14 MG: 14 PATCH, EXTENDED RELEASE TRANSDERMAL at 05:25

## 2018-06-28 RX ADMIN — Medication 1 EACH: at 20:20

## 2018-06-28 RX ADMIN — STANDARDIZED SENNA CONCENTRATE AND DOCUSATE SODIUM 2 TABLET: 8.6; 5 TABLET, FILM COATED ORAL at 20:20

## 2018-06-28 ASSESSMENT — LIFESTYLE VARIABLES: SUBSTANCE_ABUSE: 1

## 2018-06-28 NOTE — DISCHARGE PLANNING
Medical SW    Sw attended AM IDT Rounds.    RN reports, pt hallucinating, in restraints, aguirre in place,      Plan: Sw to assist w/ d/c planning as needed.

## 2018-06-28 NOTE — PROGRESS NOTES
Pulmonary Critical Care Progress Note        Admit: 6/26/2018  Date of Service: 6/28/2018  Chief Complaint: severe agitation and alcohol withdrawal seizures    History of Present Illness: 37 y.o. male with a history of alcohol abuse was admitted for alcohol withdrawal seizures and severe alcohol withdrawal syndrome     Review of Systems   Unable to perform ROS: Acuity of condition   Psychiatric/Behavioral: Positive for substance abuse.       Interval Events:  24 hour interval history reviewed    - no events overnight   - still confused and not following   - 2 point restraints   - SR 90-100s   - SBP 90-100s   - afebrile   - TFs at 50cc/hr   - UOP of 1 liter overnight   - O2 1 lpm NC   - platelets 58   - Mg at 1.6   - Phos 3.1   - pheno 2700mg    PFSH:  No change.    Physical Exam   Constitutional: He appears well-developed. No distress.   HENT:   Right Ear: External ear normal.   Left Ear: External ear normal.   Nose: Nose normal.   Mouth/Throat: Oropharynx is clear and moist. No oropharyngeal exudate.   Eyes: No scleral icterus.   Right pupil reactive to light, left non reactive and at 4mm, unable to assess EOMI, significant amount of swelling and bruising to left periorbital region and over zygomatic arch   Neck: Normal range of motion. Neck supple. No thyromegaly present.   Significant bruising noted to left neck region   Cardiovascular: Normal rate, regular rhythm, normal heart sounds and intact distal pulses.    No murmur heard.  Pulmonary/Chest: Breath sounds normal. No respiratory distress. He exhibits no tenderness.   No effort due to sedation   Abdominal: Soft. Bowel sounds are normal. He exhibits no distension.   Musculoskeletal: Normal range of motion. He exhibits no edema or tenderness.   Lymphadenopathy:     He has no cervical adenopathy.   Neurological: No cranial nerve deficit or sensory deficit. Coordination normal.   Sedated.  Not following commands.  Moving all x4   Skin: Skin is warm and dry.  Capillary refill takes less than 2 seconds. No rash noted. He is not diaphoretic.   Psychiatric:   Unable to assess   Nursing note and vitals reviewed.    Respiratory:     Pulse Oximetry: 100 %    HemoDynamics:  Pulse: 83, Heart Rate (Monitored): 83  NIBP: (!) 88/67         Neuro:      Fluids:  Intake/Output       06/26/18 0700 - 06/27/18 0659 06/27/18 0700 - 06/28/18 0659 06/28/18 0700 - 06/29/18 0659      0700-1859 7421-1806 Total 0700-1859 1900-0659 Total [id number] 1900-0659 Total       Intake    P.O.  720  -- 720  --  -- --  --  -- --    P.O. 720 -- 720 -- -- -- -- -- --    I.V.  --  1100 1100  1363.8  1500 2863.8  --  -- --    Precedex Volume -- -- -- 68.8 -- 68.8 -- -- --    IV Volume (normal saline) -- 900 900 -- -- -- -- -- --    IV Volume (normal saline 20 KCL) --  1500 2795 -- -- --    Other  --  -- --  --  30 30  --  -- --    Medications (P.O./ Enteral Liquids) -- -- -- -- 30 30 -- -- --    Enteral  --  -- --  135  380 515  --  -- --    Free Water / Tube Flush -- -- -- -- 30 30 -- -- --    Intake (mL) (Enteral Tube Right Nare Cortrak Small Bowel Feeding Tube) -- -- -- 135 350 485 -- -- --    Total Intake 720 1100 1820 1498.8 1910 3408.8 -- -- --       Output    Urine  2300  1875 4175  635  855 1490  --  -- --    Number of Times Voided 9 x -- 9 x -- -- -- -- -- --    Urine Void (mL) (non-catheter) 2300 1450 3750 -- -- -- -- -- --    Output (mL) (Urinary Catheter Indwelling Catheter 16) -- 425 425  -- -- --    Stool  --  -- --  --  -- --  --  -- --    Number of Times Stooled 1 x -- 1 x -- -- -- -- -- --    Total Output 2300 1875 4175 [phone] -- -- --       Net I/O     -4624 -072 -7209 863.8 1055 1918.8 -- -- --           Recent Labs      06/25/18   1935  06/27/18   0620  06/28/18   0350   SODIUM  137  138  138   POTASSIUM  3.8  3.2*  3.5*   CHLORIDE  99  103  103   CO2  23  24  27   BUN  6*  4*  3*   CREATININE  0.62  0.50  0.65   MAGNESIUM   --    --   1.6   PHOSPHORUS   --     --   3.1   CALCIUM  8.9  9.0  8.6       GI/Nutrition:    Liver Function  Recent Labs      18   0350   ALTSGPT  42  39  35   ASTSGOT  91*  83*  66*   ALKPHOSPHAT  61  55  54   TBILIRUBIN  2.0*  1.4  1.1   LIPASE   --    --      GLUCOSE  82  112*  90       Heme:  Recent Labs      18   035   RBC  4.24*  3.98*  3.72*   HEMOGLOBIN  14.0  13.7*  12.6*   HEMATOCRIT  40.4*  37.6*  35.7*   PLATELETCT  70*  59*  58*   PROTHROMBTM  13.9   --    --    INR  1.10   --    --        Infectious Disease:  Monitored Temp 2  Av.8 °C (98.2 °F)  Min: 35.8 °C (96.44 °F)  Max: 37.7 °C (99.9 °F)  Temp  Av.7 °C (98.1 °F)  Min: 35.8 °C (96.4 °F)  Max: 37.5 °C (99.5 °F)  Micro: reviewed  Recent Labs      18   0350   WBC  9.6  7.4  7.3   NEUTSPOLYS  84.80*  84.10*  80.30*   LYMPHOCYTES  8.10*  8.00*  11.30*   MONOCYTES  6.40  7.00  6.40   EOSINOPHILS  0.00  0.30  1.20   BASOPHILS  0.40  0.10  0.40   ASTSGOT  91*  83*  66*   ALTSGPT  42  39  35   ALKPHOSPHAT  61  55  54   TBILIRUBIN  2.0*  1.4  1.1     Current Facility-Administered Medications   Medication Dose Frequency Provider Last Rate Last Dose   • MD ALERT...Phenobarbital Alcohol Withdrawal Protocol Pharmacist to Implement (ICU Only)   MARQUIS Upton M.D.       • Pharmacy Consult Request...Benzodiazepine Review  1 Each PRISAIAH Upton M.D.        And   • PHENObarbital injection 130 mg  130 mg Q30 MIN PRISAIAH Upton M.D.   130 mg at 18 0114    And   • PHENObarbital injection 260 mg  260 mg Q30 MIN PRN Elijah Upton M.D.   260 mg at 18 0339   • thiamine (B-1) 400 mg in D5W 100 mL IVPB  400 mg DAILY GUEVARA VaughanO. 200 mL/hr at 18 0439 400 mg at 18 0439   • tobramycin (TOBREX) 0.3 % ophthalmic solution 2 Drop  2 Drop Q4HRS GUEVARA VaughanO.   2 Drop at 18 0510   • 0.9 % NaCl with KCl 20 mEq infusion   Continuous [place]  ROBERTO Martins M.D. 150 mL/hr at 06/28/18 0528     • Pharmacy Consult: Enteral tube feeding - review meds/change route/product selection  1 Each PRN Maco Brown M.D.       • multivitamin (THERAGRAN) tablet 1 Tab  1 Tab DAILY Kristin Martins M.D.   1 Tab at 06/27/18 1244    And   • folic acid (FOLVITE) tablet 1 mg  1 mg DAILY Kristin Martins M.D.   1 mg at 06/27/18 1244   • senna-docusate (PERICOLACE or SENOKOT S) 8.6-50 MG per tablet 2 Tab  2 Tab BID Kristin Martins M.D.   2 Tab at 06/27/18 2100    And   • polyethylene glycol/lytes (MIRALAX) PACKET 1 Packet  1 Packet QDAY PRN Kristin Martins M.D.        And   • magnesium hydroxide (MILK OF MAGNESIA) suspension 30 mL  30 mL QDAY PRN Kristin Martins M.D.        And   • bisacodyl (DULCOLAX) suppository 10 mg  10 mg QDAY PRN Kristin Martins M.D.       • HYDROcodone-acetaminophen (NORCO) 5-325 MG per tablet 1 Tab  1 Tab Q6HRS PRN Kristin Martins M.D.       • promethazine (PHENERGAN) tablet 12.5-25 mg  12.5-25 mg Q4HRS PRN Kristin Martins M.D.       • fentaNYL (SUBLIMAZE) injection 50 mcg  50 mcg Q HOUR PRN Kaushal Lawler D.O.       • enalaprilat (VASOTEC) injection 1.25 mg  1.25 mg Q6HRS PRN Jose Alfredo Domingo M.D.       • ondansetron (ZOFRAN) syringe/vial injection 4 mg  4 mg Q4HRS PRN Jose Alfredo Domingo M.D.       • ondansetron (ZOFRAN ODT) dispertab 4 mg  4 mg Q4HRS PRN Jose Alfredo Domingo M.D.       • promethazine (PHENERGAN) suppository 12.5-25 mg  12.5-25 mg Q4HRS PRN Jose Alfredo Domingo M.D.       • prochlorperazine (COMPAZINE) injection 5-10 mg  5-10 mg Q4HRS PRN Jose Alfredo Domingo M.D.       • nicotine (NICODERM) 14 MG/24HR 14 mg  14 mg Daily-0600 Jose Alfredo Domingo M.D.   14 mg at 06/28/18 0525    And   • nicotine polacrilex (NICORETTE) 2 MG piece 2 mg  2 mg Q HOUR PRN Jose Alfredo Domingo M.D.       • bacitracin-polymyxin b (POLYSPORIN) 500-09226 UNIT/GM ointment   BID Jhonatan Norwood M.D.         Last reviewed on 6/25/2018  9:29 PM by  Yazmin Zhang, [facility]    Quality  Measures:  Medications reviewed, EKG reviewed, Labs reviewed and Radiology images reviewed  Joshua catheter: Critically Ill - Requiring Accurate Measurement of Urinary Output      DVT Prophylaxis: Contraindicated - High bleeding risk  DVT prophylaxis - mechanical: SCDs  Ulcer prophylaxis: Not indicated          Problems/Plan:    Acute Alcohol Withdrawal Syndrome   - weaning off precedex and started phenobarbital protocol   - continuous airway management and watching for need for intubation   - s/p Rally   - cont MVI, thiamine, and folate   - will need counseling on quitting alcohol once over withdrawal  Acute Alcohol Withdrawal Seizure   - no further seizure activity   - cont seizure precautions   - cont phenobarb protocol  Acute Left Orbital Fracture   - plastics discussed in ER and no surgical intervention   - no eye involvement   - cont cold packs and tobramycin to left eye  Acute Left Medial/Lateral Maxillary Wall Fractures   - no surgical intervention at this time   - ?abx for possible set up for sinusitis  Thrombocytopenia   - holding VTE prophylaxis   - following  Hypokalemia   - repleting  Hypomagnesemia   - repleting  Alcohol Abuse   - needs counseling  Prophylaxis   - SCDs, enteral feedings    Pt remains critically ill with unstable neuro process requiring phenobarb protocol and continuous airway evaluation for need for intubation.  He is at high risk of clinical deterioration, worsening vital organ dysfunction, and death without the above critical care interventions.  Discussed patient condition and risk of morbidity and/or mortality with RN, RT, Therapies, Pharmacy, Dietary, , Charge nurse / hot rounds and hospitalist.    The patient remains critically ill.  Critical care time = 32 minutes in directly providing and coordinating critical care and extensive data review.  No time overlap and excludes procedures.

## 2018-06-28 NOTE — PROGRESS NOTES
[facility] Hospitalist Progress Note    Date of Service: 2018    Chief Complaint  37 y.o. male admitted 2018 with alcohol withdrawal seizure, facial trauma.    Interval Problem Update    Remains agitated  SR 70-80  Afebrile  TF at 50  Good UO  1l NC  Received 2700mg of phenobarb since 12pm yesterday    Replete BLAIR samayoa Mg      Consultants/Specialty  Plastic Surgery - [facility]  Ophthalmology    Disposition  TBD        Review of Systems   Unable to perform ROS: Mental status change      Physical Exam  Laboratory/Imaging   Hemodynamics  Temp (24hrs), Av.8 °C (98.2 °F), Min:35.8 °C (96.4 °F), Max:37.5 °C (99.5 °F)   Temperature: 37.1 °C (98.8 °F), Monitored Temp: 37 °C (98.6 °F)  Pulse  Av.1  Min: 42  Max: 177 Heart Rate (Monitored): 79  NIBP: (!) 98/61      Respiratory      Respiration: 12, Pulse Oximetry: 100 %        RUL Breath Sounds: Rhonchi, RML Breath Sounds: Diminished, RLL Breath Sounds: Diminished, LOKESH Breath Sounds: Rhonchi, LLL Breath Sounds: Diminished    Fluids    Intake/Output Summary (Last 24 hours) at 18 0913  Last data filed at 18 0800   Gross per 24 hour   Intake          4207.51 ml   Output             1955 ml   Net          2252.51 ml       Nutrition  Orders Placed This Encounter   Procedures   • DIET NPO     Standing Status:   Standing     Number of Occurrences:   1     Order Specific Question:   Restrict to:     Answer:   Strict [1]     Physical Exam   Constitutional: He appears well-developed and well-nourished.   HENT:   Head: Normocephalic.   Right Ear: External ear normal.   Left Ear: External ear normal.   Left facial swelling and periorbital edema slightly improved     Eyes: Conjunctivae are normal. Right eye exhibits no discharge. Left eye exhibits no discharge.   Left periorbital swelling and hematoma slightly improved  Left pupil 4 mm and non reactive right pupil 3 mm and reactive  Does not follow command to assess extraocular muscles     Neck: Neck supple. No  thyromegaly present.   Cardiovascular: Normal rate and regular rhythm.  Exam reveals no gallop and no friction rub.    No murmur heard.  Pulmonary/Chest: Effort normal. No stridor. He has no wheezes. He has rhonchi. He has no rales. He exhibits no tenderness.   Abdominal: Soft. Bowel sounds are normal. He exhibits no distension. There is no tenderness. There is no rebound and no guarding.   Musculoskeletal: He exhibits edema.   Neurological: No cranial nerve deficit.   Lethargic  Does not follow command  No focal deficits noted   Skin: Skin is warm and dry. He is not diaphoretic. No erythema.   Psychiatric: Cognition and memory are impaired. He expresses impulsivity and inappropriate judgment. He is noncommunicative. He exhibits abnormal recent memory and abnormal remote memory.   Nursing note and vitals reviewed.      Recent Labs      06/25/18 1935 06/27/18 0620 06/28/18   0350   WBC  9.6  7.4  7.3   RBC  4.24*  3.98*  3.72*   HEMOGLOBIN  14.0  13.7*  12.6*   HEMATOCRIT  40.4*  37.6*  35.7*   MCV  95.3  94.5  96.0   MCH  33.0  34.4*  33.9*   MCHC  34.7  36.4*  35.3   RDW  48.6  45.8  47.5   PLATELETCT  70*  59*  58*   MPV  11.0  12.1  10.9     Recent Labs      06/25/18 1935 06/27/18   0620  06/28/18   0350   SODIUM  137  138  138   POTASSIUM  3.8  3.2*  3.5*   CHLORIDE  99  103  103   CO2  23  24  27   GLUCOSE  82  112*  90   BUN  6*  4*  3*   CREATININE  0.62  0.50  0.65   CALCIUM  8.9  9.0  8.6     Recent Labs      06/25/18 1935   INR  1.10                  Assessment/Plan     * Delirium tremens (HCC)- (present on admission)   Assessment & Plan    Transfered to ICU for persistent agitation  Continue as needed phenobarbital  Continue thiamine and folic acid  Aspiration and seizure precautions          Facial trauma- (present on admission)   Assessment & Plan    With orbital fracture and anisocoria  Dr. Terrazas recommended outpatient follow-up  Evaluated by ophthalmology who recommended clinical monitoring  and outpatient follow-up    Continue topical antibiotics          Alcohol abuse- (present on admission)   Assessment & Plan     on cessation when more alert        Thrombocytopenia (HCC)- (present on admission)   Assessment & Plan    Likely secondary to alcoholism  Monitor CBC        Electrolyte abnormality   Assessment & Plan    Hypokalemia  Hypomagnesemia    Replete and monitor          Alcohol withdrawal seizure with delirium (HCC)   Assessment & Plan    Continue phenobarbital protocol          Tobacco use- (present on admission)   Assessment & Plan    Nicotine replacement          Quality-Core Measures   Reviewed items::  Medications reviewed, Labs reviewed and Radiology images reviewed  Joshua catheter::  Critically Ill - Requiring Accurate Measurement of Urinary Output  DVT prophylaxis pharmacological::  Contraindicated - High bleeding risk  DVT prophylaxis - mechanical:  SCDs  Ulcer Prophylaxis::  No      Plan of care discussed with Dr. Martins, nursing staff and pharmacist.

## 2018-06-29 LAB
ALBUMIN SERPL BCP-MCNC: 3.1 G/DL (ref 3.2–4.9)
ALBUMIN/GLOB SERPL: 1.2 G/DL
ALP SERPL-CCNC: 53 U/L (ref 30–99)
ALT SERPL-CCNC: 26 U/L (ref 2–50)
ANION GAP SERPL CALC-SCNC: 6 MMOL/L (ref 0–11.9)
AST SERPL-CCNC: 34 U/L (ref 12–45)
BASOPHILS # BLD AUTO: 0.6 % (ref 0–1.8)
BASOPHILS # BLD: 0.04 K/UL (ref 0–0.12)
BILIRUB SERPL-MCNC: 0.7 MG/DL (ref 0.1–1.5)
BUN SERPL-MCNC: 4 MG/DL (ref 8–22)
CALCIUM SERPL-MCNC: 8.5 MG/DL (ref 8.5–10.5)
CHLORIDE SERPL-SCNC: 105 MMOL/L (ref 96–112)
CO2 SERPL-SCNC: 29 MMOL/L (ref 20–33)
CREAT SERPL-MCNC: 0.59 MG/DL (ref 0.5–1.4)
EKG IMPRESSION: NORMAL
EKG IMPRESSION: NORMAL
EOSINOPHIL # BLD AUTO: 0.07 K/UL (ref 0–0.51)
EOSINOPHIL NFR BLD: 1.1 % (ref 0–6.9)
ERYTHROCYTE [DISTWIDTH] IN BLOOD BY AUTOMATED COUNT: 49.2 FL (ref 35.9–50)
GLOBULIN SER CALC-MCNC: 2.6 G/DL (ref 1.9–3.5)
GLUCOSE SERPL-MCNC: 119 MG/DL (ref 65–99)
HCT VFR BLD AUTO: 35 % (ref 42–52)
HGB BLD-MCNC: 12 G/DL (ref 14–18)
IMM GRANULOCYTES # BLD AUTO: 0.02 K/UL (ref 0–0.11)
IMM GRANULOCYTES NFR BLD AUTO: 0.3 % (ref 0–0.9)
LYMPHOCYTES # BLD AUTO: 0.61 K/UL (ref 1–4.8)
LYMPHOCYTES NFR BLD: 9.8 % (ref 22–41)
MAGNESIUM SERPL-MCNC: 2 MG/DL (ref 1.5–2.5)
MCH RBC QN AUTO: 33.7 PG (ref 27–33)
MCHC RBC AUTO-ENTMCNC: 34.3 G/DL (ref 33.7–35.3)
MCV RBC AUTO: 98.3 FL (ref 81.4–97.8)
MONOCYTES # BLD AUTO: 0.62 K/UL (ref 0–0.85)
MONOCYTES NFR BLD AUTO: 9.9 % (ref 0–13.4)
NEUTROPHILS # BLD AUTO: 4.89 K/UL (ref 1.82–7.42)
NEUTROPHILS NFR BLD: 78.3 % (ref 44–72)
NRBC # BLD AUTO: 0 K/UL
NRBC BLD-RTO: 0 /100 WBC
PLATELET # BLD AUTO: 74 K/UL (ref 164–446)
PMV BLD AUTO: 12.2 FL (ref 9–12.9)
POTASSIUM SERPL-SCNC: 3.3 MMOL/L (ref 3.6–5.5)
PROT SERPL-MCNC: 5.7 G/DL (ref 6–8.2)
RBC # BLD AUTO: 3.56 M/UL (ref 4.7–6.1)
SODIUM SERPL-SCNC: 140 MMOL/L (ref 135–145)
WBC # BLD AUTO: 6.3 K/UL (ref 4.8–10.8)

## 2018-06-29 PROCEDURE — A9270 NON-COVERED ITEM OR SERVICE: HCPCS | Performed by: INTERNAL MEDICINE

## 2018-06-29 PROCEDURE — 700101 HCHG RX REV CODE 250: Performed by: HOSPITALIST

## 2018-06-29 PROCEDURE — 80053 COMPREHEN METABOLIC PANEL: CPT

## 2018-06-29 PROCEDURE — 700111 HCHG RX REV CODE 636 W/ 250 OVERRIDE (IP): Performed by: INTERNAL MEDICINE

## 2018-06-29 PROCEDURE — 700105 HCHG RX REV CODE 258: Performed by: INTERNAL MEDICINE

## 2018-06-29 PROCEDURE — 700102 HCHG RX REV CODE 250 W/ 637 OVERRIDE(OP): Performed by: INTERNAL MEDICINE

## 2018-06-29 PROCEDURE — 700102 HCHG RX REV CODE 250 W/ 637 OVERRIDE(OP): Performed by: FAMILY MEDICINE

## 2018-06-29 PROCEDURE — 85025 COMPLETE CBC W/AUTO DIFF WBC: CPT

## 2018-06-29 PROCEDURE — 99291 CRITICAL CARE FIRST HOUR: CPT | Performed by: INTERNAL MEDICINE

## 2018-06-29 PROCEDURE — 99233 SBSQ HOSP IP/OBS HIGH 50: CPT | Performed by: HOSPITALIST

## 2018-06-29 PROCEDURE — A9270 NON-COVERED ITEM OR SERVICE: HCPCS | Performed by: FAMILY MEDICINE

## 2018-06-29 PROCEDURE — 83735 ASSAY OF MAGNESIUM: CPT

## 2018-06-29 PROCEDURE — A9270 NON-COVERED ITEM OR SERVICE: HCPCS | Performed by: HOSPITALIST

## 2018-06-29 PROCEDURE — 700101 HCHG RX REV CODE 250: Performed by: FAMILY MEDICINE

## 2018-06-29 PROCEDURE — 700102 HCHG RX REV CODE 250 W/ 637 OVERRIDE(OP): Performed by: HOSPITALIST

## 2018-06-29 PROCEDURE — 770022 HCHG ROOM/CARE - ICU (200)

## 2018-06-29 RX ORDER — AMOXICILLIN AND CLAVULANATE POTASSIUM 875; 125 MG/1; MG/1
1 TABLET, FILM COATED ORAL EVERY 12 HOURS
Status: DISCONTINUED | OUTPATIENT
Start: 2018-06-29 | End: 2018-07-01

## 2018-06-29 RX ADMIN — FOLIC ACID 1 MG: 1 TABLET ORAL at 07:50

## 2018-06-29 RX ADMIN — PHENOBARBITAL SODIUM 260 MG: 130 INJECTION INTRAMUSCULAR; INTRAVENOUS at 23:24

## 2018-06-29 RX ADMIN — PHENOBARBITAL SODIUM 260 MG: 130 INJECTION INTRAMUSCULAR; INTRAVENOUS at 03:00

## 2018-06-29 RX ADMIN — STANDARDIZED SENNA CONCENTRATE AND DOCUSATE SODIUM 2 TABLET: 8.6; 5 TABLET, FILM COATED ORAL at 07:49

## 2018-06-29 RX ADMIN — PHENOBARBITAL SODIUM 260 MG: 130 INJECTION INTRAMUSCULAR; INTRAVENOUS at 20:00

## 2018-06-29 RX ADMIN — NICOTINE 14 MG: 14 PATCH, EXTENDED RELEASE TRANSDERMAL at 05:09

## 2018-06-29 RX ADMIN — TOBRAMYCIN 2 DROP: 3 SOLUTION/ DROPS OPHTHALMIC at 17:08

## 2018-06-29 RX ADMIN — THERA TABS 1 TABLET: TAB at 07:50

## 2018-06-29 RX ADMIN — THIAMINE HYDROCHLORIDE 400 MG: 100 INJECTION, SOLUTION INTRAMUSCULAR; INTRAVENOUS at 07:49

## 2018-06-29 RX ADMIN — POTASSIUM CHLORIDE AND SODIUM CHLORIDE: 900; 150 INJECTION, SOLUTION INTRAVENOUS at 11:11

## 2018-06-29 RX ADMIN — TOBRAMYCIN 2 DROP: 3 SOLUTION/ DROPS OPHTHALMIC at 13:36

## 2018-06-29 RX ADMIN — HYDROCODONE BITARTRATE AND ACETAMINOPHEN 1 TABLET: 5; 325 TABLET ORAL at 18:00

## 2018-06-29 RX ADMIN — TOBRAMYCIN 2 DROP: 3 SOLUTION/ DROPS OPHTHALMIC at 08:48

## 2018-06-29 RX ADMIN — PHENOBARBITAL SODIUM 130 MG: 130 INJECTION INTRAMUSCULAR; INTRAVENOUS at 02:35

## 2018-06-29 RX ADMIN — TOBRAMYCIN 2 DROP: 3 SOLUTION/ DROPS OPHTHALMIC at 21:18

## 2018-06-29 RX ADMIN — POTASSIUM BICARBONATE 50 MEQ: 25 TABLET, EFFERVESCENT ORAL at 07:49

## 2018-06-29 RX ADMIN — TOBRAMYCIN 2 DROP: 3 SOLUTION/ DROPS OPHTHALMIC at 05:10

## 2018-06-29 RX ADMIN — POTASSIUM BICARBONATE 25 MEQ: 25 TABLET, EFFERVESCENT ORAL at 09:30

## 2018-06-29 RX ADMIN — AMOXICILLIN AND CLAVULANATE POTASSIUM 1 TABLET: 875; 125 TABLET, FILM COATED ORAL at 09:45

## 2018-06-29 RX ADMIN — STANDARDIZED SENNA CONCENTRATE AND DOCUSATE SODIUM 2 TABLET: 8.6; 5 TABLET, FILM COATED ORAL at 19:56

## 2018-06-29 RX ADMIN — Medication 1 EACH: at 21:18

## 2018-06-29 RX ADMIN — AMOXICILLIN AND CLAVULANATE POTASSIUM 1 TABLET: 875; 125 TABLET, FILM COATED ORAL at 21:00

## 2018-06-29 RX ADMIN — TOBRAMYCIN 2 DROP: 3 SOLUTION/ DROPS OPHTHALMIC at 02:01

## 2018-06-29 RX ADMIN — Medication 1 EACH: at 07:51

## 2018-06-29 RX ADMIN — PHENOBARBITAL SODIUM 130 MG: 130 INJECTION INTRAMUSCULAR; INTRAVENOUS at 14:00

## 2018-06-29 ASSESSMENT — LIFESTYLE VARIABLES
AGITATION: *
ANXIETY: *
ORIENTATION AND CLOUDING OF SENSORIUM: CANNOT DO SERIAL ADDITIONS OR IS UNCERTAIN ABOUT DATE
HEADACHE, FULLNESS IN HEAD: VERY MILD
AUDITORY DISTURBANCES: NOT PRESENT
SUBSTANCE_ABUSE: 1
ANXIETY: *
NAUSEA AND VOMITING: NO NAUSEA AND NO VOMITING
AGITATION: *
PAROXYSMAL SWEATS: BARELY PERCEPTIBLE SWEATING. PALMS MOIST
TREMOR: *
ANXIETY: MILDLY ANXIOUS
TOTAL SCORE: 3
VISUAL DISTURBANCES: MILD SENSITIVITY
ANXIETY: MILDLY ANXIOUS
PAROXYSMAL SWEATS: *
ORIENTATION AND CLOUDING OF SENSORIUM: DISORIENTED FOR PLACE AND / OR PERSON
ORIENTATION AND CLOUDING OF SENSORIUM: DISORIENTED FOR PLACE AND / OR PERSON
TREMOR: *
AUDITORY DISTURBANCES: NOT PRESENT
AGITATION: SOMEWHAT MORE THAN NORMAL ACTIVITY
PAROXYSMAL SWEATS: *
AUDITORY DISTURBANCES: NOT PRESENT
HEADACHE, FULLNESS IN HEAD: MILD
ANXIETY: *
VISUAL DISTURBANCES: VERY MILD SENSITIVITY
PAROXYSMAL SWEATS: NO SWEAT VISIBLE
HEADACHE, FULLNESS IN HEAD: MILD
AGITATION: *
TOTAL SCORE: 17
ORIENTATION AND CLOUDING OF SENSORIUM: DISORIENTED FOR PLACE AND / OR PERSON
NAUSEA AND VOMITING: NO NAUSEA AND NO VOMITING
TOTAL SCORE: VERY MILD ITCHING, PINS AND NEEDLES SENSATION, BURNING OR NUMBNESS
TOTAL SCORE: 17
AUDITORY DISTURBANCES: NOT PRESENT
VISUAL DISTURBANCES: NOT PRESENT
TOTAL SCORE: 16
NAUSEA AND VOMITING: NO NAUSEA AND NO VOMITING
VISUAL DISTURBANCES: VERY MILD SENSITIVITY
TREMOR: TREMOR NOT VISIBLE BUT CAN BE FELT, FINGERTIP TO FINGERTIP
HEADACHE, FULLNESS IN HEAD: NOT PRESENT
NAUSEA AND VOMITING: NO NAUSEA AND NO VOMITING
AUDITORY DISTURBANCES: NOT PRESENT
AGITATION: SOMEWHAT MORE THAN NORMAL ACTIVITY
ORIENTATION AND CLOUDING OF SENSORIUM: DISORIENTED FOR PLACE AND / OR PERSON
HEADACHE, FULLNESS IN HEAD: VERY MILD
VISUAL DISTURBANCES: VERY MILD SENSITIVITY
TREMOR: NO TREMOR
TREMOR: NO TREMOR
TOTAL SCORE: 9
PAROXYSMAL SWEATS: *
NAUSEA AND VOMITING: NO NAUSEA AND NO VOMITING

## 2018-06-29 NOTE — PROGRESS NOTES
Pulmonary Critical Care Progress Note        Admit: 6/26/2018  Date of Service: 6/29/2018  Chief Complaint: severe agitation and alcohol withdrawal seizures    History of Present Illness: 37 y.o. male with a history of alcohol abuse was admitted for alcohol withdrawal seizures and severe alcohol withdrawal syndrome     Review of Systems   Unable to perform ROS: Acuity of condition   Psychiatric/Behavioral: Positive for substance abuse.       Interval Events:  24 hour interval history reviewed    - no events overnight   - very lethargic and moves all, not follwing commands   - SR 80-90s   - SBP 90-110s   - Tmax 99.0   - TFs at 60cc/hr   - UOP of 1200cc with aguirre   - O2 at 1 lpm NC   - no CXR today   - WBCs at 6   - K at 3.3   - Mg 2.0   - Phenobarb 1500mg in 24 hours    Yesterday's Events:   - no events overnight   - still confused and not following   - 2 point restraints   - SR 90-100s   - SBP 90-100s   - afebrile   - TFs at 50cc/hr   - UOP of 1 liter overnight   - O2 1 lpm NC   - platelets 58   - Mg at 1.6   - Phos 3.1   - pheno 2700mg    PFSH:  No change.    Physical Exam   Constitutional: He appears well-developed. No distress.   HENT:   Right Ear: External ear normal.   Left Ear: External ear normal.   Nose: Nose normal.   Mouth/Throat: Oropharynx is clear and moist. No oropharyngeal exudate.   Eyes: No scleral icterus.   Right pupil reactive to light, left weakly reactive, unable to assess EOMI, significant amount of swelling and bruising to left periorbital region and over zygomatic arch   Neck: Normal range of motion. Neck supple. No thyromegaly present.   Significant bruising noted to left neck region   Cardiovascular: Normal rate, regular rhythm, normal heart sounds and intact distal pulses.    No murmur heard.  Pulmonary/Chest: Breath sounds normal. No respiratory distress. He exhibits no tenderness.   No effort due to sedation   Abdominal: Soft. Bowel sounds are normal. He exhibits no distension.    Musculoskeletal: Normal range of motion. He exhibits no edema or tenderness.   Lymphadenopathy:     He has no cervical adenopathy.   Neurological: No cranial nerve deficit or sensory deficit. Coordination normal.   Sedated.  Not following commands.  Moving all x4   Skin: Skin is warm and dry. Capillary refill takes less than 2 seconds. No rash noted. He is not diaphoretic.   Psychiatric:   Unable to assess   Nursing note and vitals reviewed.  exam essentially unchanged    Respiratory:     Pulse Oximetry: 99 %    HemoDynamics:  Pulse: 86, Heart Rate (Monitored): 86  NIBP: 106/72         Neuro:      Fluids:  Intake/Output       06/27/18 0700 - 06/28/18 0659 06/28/18 0700 - 06/29/18 0659 06/29/18 0700 - 06/30/18 0659      0169-9015 0914-5876 Total 6361-9319 2588-8631 Total 6591-0248 5697-1478 Total       Intake    I.V.  1363.8  1800 3163.8  906.3  500 1406.3  --  -- --    Magnesium Sulfate Volume -- -- -- 106.3 -- 106.3 -- -- --    Precedex Volume 68.8 -- 68.8 -- -- -- -- -- --    IV Volume (normal saline 20 KCL) 1295 1800 3095  -- -- --    Other  --  30 30  --  -- --  --  -- --    Medications (P.O./ Enteral Liquids) -- 30 30 -- -- -- -- -- --    Enteral  135  480 615  670  600 1270  --  -- --    Free Water / Tube Flush -- 30 30 30 -- 30 -- -- --    Intake (mL) (Enteral Tube Right Nare Cortrak Small Bowel Feeding Tube) 135 450 585  -- -- --    Total Intake 1498.8 2310 3808.8 1576.3 1100 2676.3 -- -- --       Output    Urine  635  1080 1715  750  1240 1990  --  -- --    Output (mL) (Urinary Catheter Indwelling Catheter 16) 635 1080 0775 097 7551 1990 -- -- --    Drains  --  -- --  0  0 0  --  -- --    Residual Amount (ml) (Discarded) -- -- -- 0 0 0 -- -- --    Stool  --  -- --  --  -- --  --  -- --    Number of Times Stooled -- -- -- 2 x 1 x 3 x -- -- --    Total Output 635 1080 [phone] 1990 -- -- --       Net I/O     863.8 1230 2093.8 826.3 -140 686.3 -- -- --        Weight: 91.2 kg (201  lb 1 oz)  Recent Labs      18   0620  18   0350   SODIUM  138  138   POTASSIUM  3.2*  3.5*   CHLORIDE  103  103   CO2  24  27   BUN  4*  3*   CREATININE  0.50  0.65   MAGNESIUM   --   1.6   PHOSPHORUS   --   3.1   CALCIUM  9.0  8.6       GI/Nutrition:    Liver Function  Recent Labs      18   0620  18   0350   ALTSGPT  39  35   ASTSGOT  83*  66*   ALKPHOSPHAT  55  54   TBILIRUBIN  1.4  1.1   LIPASE   --   28   GLUCOSE  112*  90       Heme:  Recent Labs      18   0620  18   0350   RBC  3.98*  3.72*   HEMOGLOBIN  13.7*  12.6*   HEMATOCRIT  37.6*  35.7*   PLATELETCT  59*  58*       Infectious Disease:  Monitored Temp 2  Av.4 °C (99.4 °F)  Min: 36.8 °C (98.2 °F)  Max: 38.2 °C (100.8 °F)  Micro: reviewed  Recent Labs      18   0620  18   0350   WBC  7.4  7.3   NEUTSPOLYS  84.10*  82.60*   LYMPHOCYTES  8.00*  13.00*   MONOCYTES  7.00  3.50   EOSINOPHILS  0.30  0.90   BASOPHILS  0.10  0.00   ASTSGOT  83*  66*   ALTSGPT  39  35   ALKPHOSPHAT  55  54   TBILIRUBIN  1.4  1.1     Current Facility-Administered Medications   Medication Dose Frequency Provider Last Rate Last Dose   • MD ALERT...Adult ICU Electrolyte Replacement per Pharmacy Protocol   PRN Maco Brown M.D.       • MD ALERT...Phenobarbital Alcohol Withdrawal Protocol Pharmacist to Implement (ICU Only)   PRISAIAH Upton M.D.       • Pharmacy Consult Request...Benzodiazepine Review  1 Each PRN Elijah Upton M.D.        And   • PHENObarbital injection 130 mg  130 mg Q30 MIN PRISAIAH Upton M.D.   130 mg at 18 0235    And   • PHENObarbital injection 260 mg  260 mg Q30 MIN PRISAIAH Upton M.D.   260 mg at 18 0300   • thiamine (B-1) 400 mg in D5W 100 mL IVPB  400 mg DAILY GUEVARA VaughanO. 200 mL/hr at 18 0900 400 mg at 18 0900   • tobramycin (TOBREX) 0.3 % ophthalmic solution 2 Drop  2 Drop Q4HRS Kaushal Lawler D.O.   2 Drop at 18 0201   • 0.9 % NaCl with KCl 20 mEq  infusion   Continuous Maco Brown M.D. 50 mL/hr at 06/28/18 1619     • Pharmacy Consult: Enteral tube feeding - review meds/change route/product selection  1 Each PRN Maco Brown M.D.       • multivitamin (THERAGRAN) tablet 1 Tab  1 Tab DAILY Kristin Martins M.D.   1 Tab at 06/28/18 0756    And   • folic acid (FOLVITE) tablet 1 mg  1 mg DAILY Kristin Martins M.D.   1 mg at 06/28/18 0756   • senna-docusate (PERICOLACE or SENOKOT S) 8.6-50 MG per tablet 2 Tab  2 Tab BID Kristin Martins M.D.   2 Tab at 06/28/18 2020    And   • polyethylene glycol/lytes (MIRALAX) PACKET 1 Packet  1 Packet QDAY PRN Kristin Martins M.D.        And   • magnesium hydroxide (MILK OF MAGNESIA) suspension 30 mL  30 mL QDAY PRN Kristin Martins M.D.        And   • bisacodyl (DULCOLAX) suppository 10 mg  10 mg QDAY PRN Kristin Martins M.D.       • HYDROcodone-acetaminophen (NORCO) 5-325 MG per tablet 1 Tab  1 Tab Q6HRS PRN Kristin Martins M.D.       • promethazine (PHENERGAN) tablet 12.5-25 mg  12.5-25 mg Q4HRS PRN Kristin Martins M.D.       • fentaNYL (SUBLIMAZE) injection 50 mcg  50 mcg Q HOUR PRN Kaushal Lawler D.O.   50 mcg at 06/28/18 1007   • enalaprilat (VASOTEC) injection 1.25 mg  1.25 mg Q6HRS PRN Jose Alfredo Domingo M.D.       • ondansetron (ZOFRAN) syringe/vial injection 4 mg  4 mg Q4HRS PRN Jose Alfredo Domingo M.D.       • ondansetron (ZOFRAN ODT) dispertab 4 mg  4 mg Q4HRS PRN Jose Alfredo Domingo M.D.       • promethazine (PHENERGAN) suppository 12.5-25 mg  12.5-25 mg Q4HRS PRN Jose Alfredo Domingo M.D.       • prochlorperazine (COMPAZINE) injection 5-10 mg  5-10 mg Q4HRS PRN Jose Alfredo Domingo M.D.       • nicotine (NICODERM) 14 MG/24HR 14 mg  14 mg Daily-0600 Jose Alfredo Domingo M.D.   14 mg at 06/28/18 0525    And   • nicotine polacrilex (NICORETTE) 2 MG piece 2 mg  2 mg Q HOUR PRN Jose Alfredo Domingo M.D.       • bacitracin-polymyxin b (POLYSPORIN) 500-43450 UNIT/GM ointment   BUSTER Norwood  M.D.   1 Each at 06/28/18 2020     Last reviewed on 6/25/2018  9:29 PM by Nupur Nguyen    Quality  Measures:  Medications reviewed, EKG reviewed, Labs reviewed and Radiology images reviewed  Joshua catheter: Critically Ill - Requiring Accurate Measurement of Urinary Output      DVT Prophylaxis: Contraindicated - High bleeding risk  DVT prophylaxis - mechanical: SCDs  Ulcer prophylaxis: Not indicated          Problems/Plan:    Acute Alcohol Withdrawal Syndrome   - cont phenobarbital protocol   - continuous airway management and watching for need for intubation   - s/p Rally   - cont MVI, thiamine, and folate   - will need counseling on quitting alcohol once over withdrawal  Acute Alcohol Withdrawal Seizure   - no further seizure activity   - cont seizure precautions   - cont phenobarb protocol  Acute Left Orbital Fracture   - plastics discussed in ER and no surgical intervention   - no eye involvement   - cont cold packs and tobramycin to left eye  Acute Left Medial/Lateral Maxillary Wall Fractures   - no surgical intervention at this time   - augmentin 875mg BID for 10 days  Thrombocytopenia   - holding VTE prophylaxis   - following  Hypokalemia   - repleting  Hypomagnesemia   - repleting  Alcohol Abuse   - needs counseling  Prophylaxis   - SCDs, enteral feedings    Pt remains critically ill with unstable neuro process requiring phenobarbital protocol and continues airway evaluation/management for need for intubation.  He is at high risk of clinical deterioration, worsening vital organ dysfunction, and death without the above critical care interventions.  Discussed patient condition and risk of morbidity and/or mortality with RN, RT, Therapies, Pharmacy, Dietary, , Charge nurse / hot rounds and hospitalist.    The patient remains critically ill.  Critical care time = 31 minutes in directly providing and coordinating critical care and extensive data review.  No time overlap and excludes  procedures.

## 2018-06-29 NOTE — CARE PLAN
Problem: Nutritional:  Goal: Nutrition support tolerated and meeting greater than 85% of estimated needs  Outcome: MET Date Met: 06/29/18

## 2018-06-29 NOTE — PROGRESS NOTES
[facility] Hospitalist Progress Note    Date of Service: 2018    Chief Complaint  37 y.o. male admitted 2018 with alcohol withdrawal seizure, facial trauma.    Interval Problem Update    Tmax 99  TF at 60  Lethargic  Received 1500mg phenobarb since noon yesterday           Consultants/Specialty  Plastic Surgery - [place]  Ophthalmology    Disposition  TBD        Review of Systems   Unable to perform ROS: Mental status change      Physical Exam  Laboratory/Imaging   Hemodynamics  No data recorded.   Monitored Temp: 37.8 °C (100 °F)  Pulse  Av.8  Min: 42  Max: 177 Heart Rate (Monitored): 94  NIBP: (!) 99/62      Respiratory      Respiration: 20, Pulse Oximetry: 98 %        RUL Breath Sounds: Rhonchi, RML Breath Sounds: Diminished, RLL Breath Sounds: Diminished, LOKESH Breath Sounds: Rhonchi, LLL Breath Sounds: Diminished    Fluids    Intake/Output Summary (Last 24 hours) at 18 0919  Last data filed at 18 0800   Gross per 24 hour   Intake          2626.25 ml   Output             1880 ml   Net           746.25 ml       Nutrition  Orders Placed This Encounter   Procedures   • DIET NPO     Standing Status:   Standing     Number of Occurrences:   1     Order Specific Question:   Restrict to:     Answer:   Strict [1]     Physical Exam   Constitutional: He appears well-developed and well-nourished.   HENT:   Head: Normocephalic.   Right Ear: External ear normal.   Left Ear: External ear normal.   Left facial swelling and periorbital edema with abrasions about the same     Eyes: Conjunctivae are normal. Right eye exhibits no discharge. Left eye exhibits no discharge.   Left periorbital swelling and hematoma   Left pupil 4 mm sluggishly reactive to light right pupil 3 mm and reactive    Unable to assess EOM S patient does not follow command     Neck: Neck supple. No JVD present. No thyromegaly present.   Cardiovascular: Normal rate and regular rhythm.  Exam reveals no friction rub.    No murmur  heard.  Pulmonary/Chest: Effort normal. No stridor. He has no decreased breath sounds. He has no wheezes. He has rhonchi. He has no rales. He exhibits no tenderness and no crepitus.   Abdominal: Soft. Bowel sounds are normal. He exhibits no distension. There is no tenderness. There is no rebound.   Musculoskeletal: He exhibits edema.   Neurological: No cranial nerve deficit.   Patient is lethargic  No focal deficits are noted  Does not follow command   Skin: Skin is warm and dry. He is not diaphoretic. No erythema.   Psychiatric: He is slowed. Cognition and memory are impaired. He expresses impulsivity. He is noncommunicative.   Nursing note and vitals reviewed.      Recent Labs      06/27/18 0620 06/28/18   0350  06/29/18   0515   WBC  7.4  7.3  6.3   RBC  3.98*  3.72*  3.56*   HEMOGLOBIN  13.7*  12.6*  12.0*   HEMATOCRIT  37.6*  35.7*  35.0*   MCV  94.5  96.0  98.3*   MCH  34.4*  33.9*  33.7*   MCHC  36.4*  35.3  34.3   RDW  45.8  47.5  49.2   PLATELETCT  59*  58*  74*   MPV  12.1  10.9  12.2     Recent Labs      06/27/18 0620 06/28/18   0350  06/29/18   0515   SODIUM  138  138  140   POTASSIUM  3.2*  3.5*  3.3*   CHLORIDE  103  103  105   CO2  24  27  29   GLUCOSE  112*  90  119*   BUN  4*  3*  4*   CREATININE  0.50  0.65  0.59   CALCIUM  9.0  8.6  8.5                      Assessment/Plan     * Delirium tremens (HCC)- (present on admission)   Assessment & Plan    Transfered to ICU for persistent agitation  Remains on phenobarbital protocol  Continue close clinical monitoring with aspiration seizure precautions  Continue thiamine and folic acid          Facial trauma- (present on admission)   Assessment & Plan    With orbital fracture and anisocoria  Dr. Terrazas recommended outpatient follow-up for treatment of his fracture after swelling has improved  Evaluated by ophthalmology who recommended clinical monitoring and outpatient follow-up    Given sinus hemorrhage patient at high risk of sinusitis he will be  started on Augmentin  Continue close clinical monitoring          Alcohol abuse- (present on admission)   Assessment & Plan     on cessation when more alert        Thrombocytopenia (HCC)- (present on admission)   Assessment & Plan    Likely secondary to alcoholism  Platelets 74K improved monitor CBC        Electrolyte abnormality   Assessment & Plan    Hypokalemia  Potassium 3.3    Replete and monitor          Alcohol withdrawal seizure with delirium (HCC)   Assessment & Plan    Continue phenobarbital protocol          Tobacco use- (present on admission)   Assessment & Plan    Nicotine replacement          Quality-Core Measures   Reviewed items::  Medications reviewed, Labs reviewed and Radiology images reviewed  Joshua catheter::  Critically Ill - Requiring Accurate Measurement of Urinary Output  DVT prophylaxis pharmacological::  Contraindicated - High bleeding risk  DVT prophylaxis - mechanical:  SCDs  Ulcer Prophylaxis::  No  Antibiotics:  Treating active infection/contamination beyond 24 hours perioperative coverage      Plan of care discussed with Dr. Martins, nursing staff and pharmacist.

## 2018-06-29 NOTE — DISCHARGE PLANNING
Medical SW    Sw attended AM IDT Rounds.    RN reports, pt ETOH withdrawal and seizures on admit, lethargic, moves away from pain, aguirre in place,       Plan: Sw to assist w/ d/c planning as needed.

## 2018-06-30 LAB
ALBUMIN SERPL BCP-MCNC: 3.6 G/DL (ref 3.2–4.9)
ALBUMIN/GLOB SERPL: 1.3 G/DL
ALP SERPL-CCNC: 67 U/L (ref 30–99)
ALT SERPL-CCNC: 24 U/L (ref 2–50)
ANION GAP SERPL CALC-SCNC: 10 MMOL/L (ref 0–11.9)
AST SERPL-CCNC: 27 U/L (ref 12–45)
BASOPHILS # BLD AUTO: 0.6 % (ref 0–1.8)
BASOPHILS # BLD: 0.03 K/UL (ref 0–0.12)
BILIRUB SERPL-MCNC: 0.7 MG/DL (ref 0.1–1.5)
BUN SERPL-MCNC: 6 MG/DL (ref 8–22)
CALCIUM SERPL-MCNC: 8.7 MG/DL (ref 8.5–10.5)
CHLORIDE SERPL-SCNC: 104 MMOL/L (ref 96–112)
CO2 SERPL-SCNC: 26 MMOL/L (ref 20–33)
CREAT SERPL-MCNC: 0.45 MG/DL (ref 0.5–1.4)
EOSINOPHIL # BLD AUTO: 0.07 K/UL (ref 0–0.51)
EOSINOPHIL NFR BLD: 1.3 % (ref 0–6.9)
ERYTHROCYTE [DISTWIDTH] IN BLOOD BY AUTOMATED COUNT: 50.3 FL (ref 35.9–50)
GLOBULIN SER CALC-MCNC: 2.7 G/DL (ref 1.9–3.5)
GLUCOSE SERPL-MCNC: 112 MG/DL (ref 65–99)
HCT VFR BLD AUTO: 33.6 % (ref 42–52)
HGB BLD-MCNC: 11.5 G/DL (ref 14–18)
IMM GRANULOCYTES # BLD AUTO: 0.03 K/UL (ref 0–0.11)
IMM GRANULOCYTES NFR BLD AUTO: 0.6 % (ref 0–0.9)
LYMPHOCYTES # BLD AUTO: 0.6 K/UL (ref 1–4.8)
LYMPHOCYTES NFR BLD: 11.5 % (ref 22–41)
MCH RBC QN AUTO: 33.9 PG (ref 27–33)
MCHC RBC AUTO-ENTMCNC: 34.2 G/DL (ref 33.7–35.3)
MCV RBC AUTO: 99.1 FL (ref 81.4–97.8)
MONOCYTES # BLD AUTO: 0.68 K/UL (ref 0–0.85)
MONOCYTES NFR BLD AUTO: 13 % (ref 0–13.4)
NEUTROPHILS # BLD AUTO: 3.81 K/UL (ref 1.82–7.42)
NEUTROPHILS NFR BLD: 73 % (ref 44–72)
NRBC # BLD AUTO: 0 K/UL
NRBC BLD-RTO: 0 /100 WBC
PHOSPHATE SERPL-MCNC: 3.2 MG/DL (ref 2.5–4.5)
PLATELET # BLD AUTO: 85 K/UL (ref 164–446)
PMV BLD AUTO: 11.1 FL (ref 9–12.9)
POTASSIUM SERPL-SCNC: 4.2 MMOL/L (ref 3.6–5.5)
PROT SERPL-MCNC: 6.3 G/DL (ref 6–8.2)
RBC # BLD AUTO: 3.39 M/UL (ref 4.7–6.1)
SODIUM SERPL-SCNC: 140 MMOL/L (ref 135–145)
WBC # BLD AUTO: 5.2 K/UL (ref 4.8–10.8)

## 2018-06-30 PROCEDURE — 700102 HCHG RX REV CODE 250 W/ 637 OVERRIDE(OP): Performed by: INTERNAL MEDICINE

## 2018-06-30 PROCEDURE — 84100 ASSAY OF PHOSPHORUS: CPT

## 2018-06-30 PROCEDURE — 99233 SBSQ HOSP IP/OBS HIGH 50: CPT | Performed by: HOSPITALIST

## 2018-06-30 PROCEDURE — 85025 COMPLETE CBC W/AUTO DIFF WBC: CPT

## 2018-06-30 PROCEDURE — 700111 HCHG RX REV CODE 636 W/ 250 OVERRIDE (IP): Performed by: INTERNAL MEDICINE

## 2018-06-30 PROCEDURE — 80053 COMPREHEN METABOLIC PANEL: CPT

## 2018-06-30 PROCEDURE — 700102 HCHG RX REV CODE 250 W/ 637 OVERRIDE(OP): Performed by: FAMILY MEDICINE

## 2018-06-30 PROCEDURE — 700101 HCHG RX REV CODE 250: Performed by: HOSPITALIST

## 2018-06-30 PROCEDURE — A9270 NON-COVERED ITEM OR SERVICE: HCPCS | Performed by: INTERNAL MEDICINE

## 2018-06-30 PROCEDURE — 700101 HCHG RX REV CODE 250: Performed by: FAMILY MEDICINE

## 2018-06-30 PROCEDURE — A9270 NON-COVERED ITEM OR SERVICE: HCPCS | Performed by: FAMILY MEDICINE

## 2018-06-30 PROCEDURE — 700102 HCHG RX REV CODE 250 W/ 637 OVERRIDE(OP): Performed by: HOSPITALIST

## 2018-06-30 PROCEDURE — 770022 HCHG ROOM/CARE - ICU (200)

## 2018-06-30 PROCEDURE — A9270 NON-COVERED ITEM OR SERVICE: HCPCS | Performed by: HOSPITALIST

## 2018-06-30 PROCEDURE — 99291 CRITICAL CARE FIRST HOUR: CPT | Performed by: INTERNAL MEDICINE

## 2018-06-30 RX ADMIN — PHENOBARBITAL SODIUM 260 MG: 130 INJECTION INTRAMUSCULAR; INTRAVENOUS at 03:25

## 2018-06-30 RX ADMIN — PHENOBARBITAL SODIUM 260 MG: 130 INJECTION INTRAMUSCULAR; INTRAVENOUS at 20:41

## 2018-06-30 RX ADMIN — NICOTINE 14 MG: 14 PATCH, EXTENDED RELEASE TRANSDERMAL at 04:49

## 2018-06-30 RX ADMIN — PHENOBARBITAL SODIUM 130 MG: 130 INJECTION INTRAMUSCULAR; INTRAVENOUS at 15:52

## 2018-06-30 RX ADMIN — HYDROCODONE BITARTRATE AND ACETAMINOPHEN 1 TABLET: 5; 325 TABLET ORAL at 08:00

## 2018-06-30 RX ADMIN — AMOXICILLIN AND CLAVULANATE POTASSIUM 1 TABLET: 875; 125 TABLET, FILM COATED ORAL at 21:00

## 2018-06-30 RX ADMIN — THERA TABS 1 TABLET: TAB at 07:39

## 2018-06-30 RX ADMIN — TOBRAMYCIN 2 DROP: 3 SOLUTION/ DROPS OPHTHALMIC at 03:25

## 2018-06-30 RX ADMIN — Medication 1 EACH: at 20:30

## 2018-06-30 RX ADMIN — FOLIC ACID 1 MG: 1 TABLET ORAL at 07:39

## 2018-06-30 RX ADMIN — POTASSIUM CHLORIDE AND SODIUM CHLORIDE: 900; 150 INJECTION, SOLUTION INTRAVENOUS at 07:39

## 2018-06-30 RX ADMIN — AMOXICILLIN AND CLAVULANATE POTASSIUM 1 TABLET: 875; 125 TABLET, FILM COATED ORAL at 09:00

## 2018-06-30 RX ADMIN — PHENOBARBITAL SODIUM 260 MG: 130 INJECTION INTRAMUSCULAR; INTRAVENOUS at 12:22

## 2018-06-30 RX ADMIN — STANDARDIZED SENNA CONCENTRATE AND DOCUSATE SODIUM 2 TABLET: 8.6; 5 TABLET, FILM COATED ORAL at 20:30

## 2018-06-30 RX ADMIN — PHENOBARBITAL SODIUM 260 MG: 130 INJECTION INTRAMUSCULAR; INTRAVENOUS at 18:00

## 2018-06-30 RX ADMIN — HYDROCODONE BITARTRATE AND ACETAMINOPHEN 1 TABLET: 5; 325 TABLET ORAL at 16:00

## 2018-06-30 RX ADMIN — PHENOBARBITAL SODIUM 130 MG: 130 INJECTION INTRAMUSCULAR; INTRAVENOUS at 08:18

## 2018-06-30 RX ADMIN — Medication 1 EACH: at 07:39

## 2018-06-30 ASSESSMENT — LIFESTYLE VARIABLES
TOTAL SCORE: 10
ORIENTATION AND CLOUDING OF SENSORIUM: DISORIENTED FOR PLACE AND / OR PERSON
PAROXYSMAL SWEATS: BARELY PERCEPTIBLE SWEATING. PALMS MOIST
AUDITORY DISTURBANCES: NOT PRESENT
VISUAL DISTURBANCES: NOT PRESENT
ANXIETY: *
VISUAL DISTURBANCES: NOT PRESENT
AGITATION: SOMEWHAT MORE THAN NORMAL ACTIVITY
HEADACHE, FULLNESS IN HEAD: NOT PRESENT
TREMOR: NO TREMOR
ANXIETY: *
ORIENTATION AND CLOUDING OF SENSORIUM: DISORIENTED FOR PLACE AND / OR PERSON
PAROXYSMAL SWEATS: *
TREMOR: NO TREMOR
AGITATION: *
SUBSTANCE_ABUSE: 1
HEADACHE, FULLNESS IN HEAD: VERY MILD
TOTAL SCORE: 10
NAUSEA AND VOMITING: NO NAUSEA AND NO VOMITING
AUDITORY DISTURBANCES: VERY MILD HARSHNESS OR ABILITY TO FRIGHTEN
NAUSEA AND VOMITING: NO NAUSEA AND NO VOMITING

## 2018-06-30 NOTE — PROGRESS NOTES
12 hour chart check completed. Assumed care, bedside report received from Marina FAJARDO. Pt attached to telemetry monitoring, alarm parameters set properly, drips verified. RASS -1/+1, restraints in place per active order. Pt unable to follow commands, mumbles words.

## 2018-06-30 NOTE — CARE PLAN
Problem: Safety  Goal: Will remain free from injury  Outcome: PROGRESSING AS EXPECTED  Soft wrist restraints in place per active order, bed alarm on.     Problem: Skin Integrity  Goal: Risk for impaired skin integrity will decrease  Outcome: PROGRESSING AS EXPECTED  Pt turned and repositioned q2h, pillows in use for support and positioning, mepilex applied.

## 2018-06-30 NOTE — PROGRESS NOTES
Pulmonary Critical Care Progress Note        Admit: 6/26/2018  Date of Service: 6/30/2018  Chief Complaint: severe agitation and alcohol withdrawal seizures    History of Present Illness: 37 y.o. male with a history of alcohol abuse was admitted for alcohol withdrawal seizures and severe alcohol withdrawal syndrome     Review of Systems   Unable to perform ROS: Acuity of condition   Psychiatric/Behavioral: Positive for substance abuse.       Interval Events:  24 hour interval history reviewed    - no events overnight   - still required pheno overnight   - mumbling and withdraws to pain   - SR 80s   - SBP 90-110s   - TFs at goal   - UOP of 700cc overnight with aguirre   - no RT issues   - no CXR today   - SCDs   - day #2 of 10 of Augmentin   - afebrile   - WBCs at 6   - phenobarb of 900mg in 24 hours    Yesterday's Events:   - no events overnight   - very lethargic and moves all, not follwing commands   - SR 80-90s   - SBP 90-110s   - Tmax 99.0   - TFs at 60cc/hr   - UOP of 1200cc with aguirre   - O2 at 1 lpm NC   - no CXR today   - WBCs at 6   - K at 3.3   - Mg 2.0   - Phenobarb 1500mg in 24 hours    PFSH:  No change.    Physical Exam   Constitutional: He appears well-developed. No distress.   Older than stated age and disheveled in appearance   HENT:   Right Ear: External ear normal.   Left Ear: External ear normal.   Nose: Nose normal.   Mouth/Throat: Oropharynx is clear and moist. No oropharyngeal exudate.   Eyes: No scleral icterus.   Right pupil reactive to light, left weakly reactive and about 6-7mm, unable to assess EOMI, significant amount of swelling and bruising to left periorbital region and over zygomatic arch     Neck: Normal range of motion. Neck supple. No thyromegaly present.   Significant bruising to left neck region appears to be improving   Cardiovascular: Normal rate, regular rhythm, normal heart sounds and intact distal pulses.    No murmur heard.  Pulmonary/Chest: Effort normal and breath sounds  normal. No respiratory distress. He exhibits no tenderness.   Abdominal: Soft. Bowel sounds are normal. He exhibits no distension and no mass. There is no tenderness.   Musculoskeletal: Normal range of motion. He exhibits no edema or tenderness.   Lymphadenopathy:     He has no cervical adenopathy.   Neurological: No cranial nerve deficit or sensory deficit. He exhibits normal muscle tone. Coordination normal.   Sedated, but mumbling slurred speech   Skin: Skin is warm and dry. Capillary refill takes less than 2 seconds. No rash noted. He is not diaphoretic.   Psychiatric:   Unable to assess   Nursing note and vitals reviewed.      Respiratory:     Pulse Oximetry: 99 %    HemoDynamics:  Pulse: 80, Heart Rate (Monitored): 80  NIBP: 108/68         Neuro:    Fluids:  Intake/Output       06/28/18 0700 - 06/29/18 0659 06/29/18 0700 - 06/30/18 0659 06/30/18 0700 - 07/01/18 0659      5568-3327 1459-9237 Total 7057-3127 2880-2667 Total 0826-7968 8551-9366 Total       Intake    I.V.  906.3  600 1506.3  500  400 900  --  -- --    Magnesium Sulfate Volume 106.3 -- 106.3 -- -- -- -- -- --    IV Volume (normal saline 20 KCL)  500 400 900 -- -- --    Other  --  -- --  30  30 60  --  -- --    Medications (P.O./ Enteral Liquids) -- -- -- 30 30 60 -- -- --    Enteral  670  600 1270  850  650 1500  --  -- --    Free Water / Tube Flush 30 -- 30 30 90 120 -- -- --    Intake (mL) (Enteral Tube Right Nare Cortrak Small Bowel Feeding Tube)   -- -- --    Total Intake 1576.3 1200 2776.3 1380 1080 2460 -- -- --       Output    Urine  750  1280 2030  780  660 1440  --  -- --    Output (mL) (Urinary Catheter Indwelling Catheter 16) 750 1280 2030  -- -- --    Drains  0  0 0  0  -- 0  --  -- --    Residual Amount (ml) (Discarded) 0 0 0 0 -- 0 -- -- --    Stool  --  -- --  --  -- --  --  -- --    Number of Times Stooled 2 x 1 x 3 x 1 x 0 x 1 x -- -- --    Total Output 750 1280 2030 [phone] --  -- --       Net I/O     826.3 -80 746.3  -- -- --           Recent Labs      18   SODIUM  138  138  140   POTASSIUM  3.2*  3.5*  3.3*   CHLORIDE  103  103  105   CO2    29   BUN  4*  3*  4*   CREATININE  0.50  0.65  0.59   MAGNESIUM   --   1.6  2.0   PHOSPHORUS   --   3.1   --    CALCIUM  9.0  8.6  8.5       GI/Nutrition:    Liver Function  Recent Labs      18   ALTSGPT  39  35  26   ASTSGOT  83*  66*  34   ALKPHOSPHAT  55  54  53   TBILIRUBIN  1.4  1.1  0.7   LIPASE   --   28   --    GLUCOSE  112*  90  119*       Heme:  Recent Labs      18   RBC  3.98*  3.72*  3.56*   HEMOGLOBIN  13.7*  12.6*  12.0*   HEMATOCRIT  37.6*  35.7*  35.0*   PLATELETCT  59*  58*  74*       Infectious Disease:  Monitored Temp 2  Av.6 °C (99.7 °F)  Min: 36.9 °C (98.4 °F)  Max: 38.1 °C (100.6 °F)  Temp  Av.6 °C (99.7 °F)  Min: 37.5 °C (99.5 °F)  Max: 37.7 °C (99.9 °F)  Micro: reviewed  Recent Labs      18   WBC  7.4  7.3  6.3   NEUTSPOLYS  84.10*  82.60*  78.30*   LYMPHOCYTES  8.00*  13.00*  9.80*   MONOCYTES  7.00  3.50  9.90   EOSINOPHILS  0.30  0.90  1.10   BASOPHILS  0.10  0.00  0.60   ASTSGOT  83*  66*  34   ALTSGPT  39  35  26   ALKPHOSPHAT  55  54  53   TBILIRUBIN  1.4  1.1  0.7     Current Facility-Administered Medications   Medication Dose Frequency Provider Last Rate Last Dose   • amoxicillin-clavulanate (AUGMENTIN) 875-125 MG per tablet 1 Tab  1 Tab Q12HRS Maco Brown M.D.   1 Tab at 18 2100   • MD ALERT...Adult ICU Electrolyte Replacement per Pharmacy Protocol   PRN Maco Brown M.D.       • MD ALERT...Phenobarbital Alcohol Withdrawal Protocol Pharmacist to Implement (ICU Only)   MARQUIS Upton M.D.       • Pharmacy Consult Request...Benzodiazepine Review  1 Each PRISAIAH Upton M.D.         And   • PHENObarbital injection 130 mg  130 mg Q30 MIN PRN Elijah Upton M.D.   130 mg at 06/29/18 1400    And   • PHENObarbital injection 260 mg  260 mg Q30 MIN PRN Elijah Upton M.D.   260 mg at 06/30/18 0325   • 0.9 % NaCl with KCl 20 mEq infusion   Continuous Maco Brown M.D. 50 mL/hr at 06/29/18 1111     • Pharmacy Consult: Enteral tube feeding - review meds/change route/product selection  1 Each PRN Maco Brown M.D.       • multivitamin (THERAGRAN) tablet 1 Tab  1 Tab DAILY Kristin Martins M.D.   1 Tab at 06/29/18 0750    And   • folic acid (FOLVITE) tablet 1 mg  1 mg DAILY Kristin Martins M.D.   1 mg at 06/29/18 0750   • senna-docusate (PERICOLACE or SENOKOT S) 8.6-50 MG per tablet 2 Tab  2 Tab BID Kristin Martins M.D.   2 Tab at 06/29/18 1956    And   • polyethylene glycol/lytes (MIRALAX) PACKET 1 Packet  1 Packet QDAY PRN Kristin Martins M.D.        And   • magnesium hydroxide (MILK OF MAGNESIA) suspension 30 mL  30 mL QDAY PRN Kristin Martins M.D.        And   • bisacodyl (DULCOLAX) suppository 10 mg  10 mg QDAY PRN Kristin Martins M.D.       • HYDROcodone-acetaminophen (NORCO) 5-325 MG per tablet 1 Tab  1 Tab Q6HRS PRN Kristin Martins M.D.   1 Tab at 06/29/18 1800   • promethazine (PHENERGAN) tablet 12.5-25 mg  12.5-25 mg Q4HRS PRN Kristin Martins M.D.       • fentaNYL (SUBLIMAZE) injection 50 mcg  50 mcg Q HOUR PRN Kaushal Lawler D.O.   50 mcg at 06/28/18 1007   • enalaprilat (VASOTEC) injection 1.25 mg  1.25 mg Q6HRS PRN Jose Alfredo Domingo M.D.       • ondansetron (ZOFRAN) syringe/vial injection 4 mg  4 mg Q4HRS PRN Jose Alfredo Domingo M.D.       • ondansetron (ZOFRAN ODT) dispertab 4 mg  4 mg Q4HRS PRISAIAH Domingo M.D.       • promethazine (PHENERGAN) suppository 12.5-25 mg  12.5-25 mg Q4HRS PRISAIAH Domingo M.D.       • prochlorperazine (COMPAZINE) injection 5-10 mg  5-10 mg Q4HRS PRISAIAH Domingo M.D.       • nicotine (NICODERM) 14 MG/24HR 14 mg  14  mg Daily-0600 Jose Alfredo Domigno M.D.   14 mg at 06/29/18 0509    And   • nicotine polacrilex (NICORETTE) 2 MG piece 2 mg  2 mg Q HOUR PRN Jose Alfredo Domingo M.D.       • bacitracin-polymyxin b (POLYSPORIN) 500-75389 UNIT/GM ointment   BID Jhonatan Norwood M.D.   1 Each at 06/29/18 2118     Last reviewed on 6/25/2018  9:29 PM by Yazmin Zhang [facility]    Quality  Measures:  Medications reviewed, EKG reviewed, Labs reviewed and Radiology images reviewed  Joshua catheter: Critically Ill - Requiring Accurate Measurement of Urinary Output      DVT Prophylaxis: Contraindicated - High bleeding risk  DVT prophylaxis - mechanical: SCDs  Ulcer prophylaxis: Not indicated        Problems/Plan:    Acute Alcohol Withdrawal Syndrome   - cont phenobarbital protocol   - continuous airway management and watching for need for intubation   - s/p Rally bag   - cont MVI, thiamine, and folate   - will need counseling on quitting alcohol once over withdrawal  Acute Alcohol Withdrawal Seizure   - still no seizure activity   - cont seizure precautions   - cont phenobarb protocol  Acute Left Orbital Fracture   - plastics discussed in ER and no surgical intervention   - no eye involvement   - cont cold packs and tobramycin to left eye  Acute Left Medial/Lateral Maxillary Wall Fractures   - no surgical intervention at this time   - cont augmentin 875mg BID for 10 days  Thrombocytopenia   - holding VTE prophylaxis   - following  Hypokalemia   - repleted  Hypomagnesemia   - repleted  Alcohol Abuse   - needs counseling  Prophylaxis   - SCDs, enteral feedings    Pt remains critically ill with unstable neuro process requiring phenobarbital protocol and continuous airway evaluation/management for need for intubation.  He is at high risk of clinical deterioration, worsening vital organ dysfunction, and death without the above critical care interventions.  .  Discussed patient condition and risk of morbidity and/or mortality with RN, RT, Therapies,  Pharmacy, Dietary, , Charge nurse / hot rounds and hospitalist.  The patient remains critically ill.  Critical care time = 31 minutes in directly providing and coordinating critical care and extensive data review.  No time overlap and excludes procedures.

## 2018-06-30 NOTE — PROGRESS NOTES
[facility] Hospitalist Progress Note    Date of Service: 2018    Chief Complaint  37 y.o. male admitted 2018 with alcohol withdrawal seizure, facial trauma.    Interval Problem Update    NSR  SBP   Afebrile  TF at 70ml  Received 3 doses of phenobarb overnight, 900mg since yesterday noon   Day 2/10 augmentin         Consultants/Specialty  Plastic Surgery - [place]  Ophthalmology  CC  Disposition  TBD        Review of Systems   Unable to perform ROS: Mental status change      Physical Exam  Laboratory/Imaging   Hemodynamics  Temp (24hrs), Av.5 °C (99.5 °F), Min:37.2 °C (99 °F), Max:37.7 °C (99.9 °F)   Temperature: 37.2 °C (99 °F), Monitored Temp: 37.2 °C (99 °F)  Pulse  Av.4  Min: 42  Max: 177 Heart Rate (Monitored): 70  NIBP: (!) 93/65      Respiratory      Respiration: 13, Pulse Oximetry: 99 %     Work Of Breathing / Effort: Mild  RUL Breath Sounds: Rhonchi, RML Breath Sounds: Diminished, RLL Breath Sounds: Diminished, LOKESH Breath Sounds: Rhonchi, LLL Breath Sounds: Diminished    Fluids    Intake/Output Summary (Last 24 hours) at 18 0911  Last data filed at 18 0800   Gross per 24 hour   Intake             2990 ml   Output             1680 ml   Net             1310 ml       Nutrition  Orders Placed This Encounter   Procedures   • DIET NPO     Standing Status:   Standing     Number of Occurrences:   1     Order Specific Question:   Restrict to:     Answer:   Strict [1]     Physical Exam   Constitutional: He appears well-developed and well-nourished.   HENT:   Head: Normocephalic.   Mouth/Throat: No oropharyngeal exudate.   Facial and left periorbital swelling improved  Core track in place     Eyes: Conjunctivae are normal. Right eye exhibits no discharge. Left eye exhibits no discharge. No scleral icterus.   Anisocoria left pupil 4 mm reactive  Unable to assess extraocular motion as patient not cooperative     Neck: Neck supple. No JVD present. No thyromegaly present.   Cardiovascular:  Normal rate and regular rhythm.  Exam reveals no friction rub.    No murmur heard.  Pulmonary/Chest: Effort normal. He has rhonchi. He has no rales. He exhibits no bony tenderness.   Abdominal: Soft. Bowel sounds are normal. He exhibits no distension. There is no tenderness. There is no rebound and no guarding.   Musculoskeletal: He exhibits edema.   Neurological: No cranial nerve deficit. He exhibits normal muscle tone.   Patient is somnolent arouses to verbal stimulation  No focal deficits are noted     Skin: Skin is warm and dry. He is not diaphoretic. No erythema.   Psychiatric: His speech is delayed. He is slowed. Cognition and memory are impaired. He expresses impulsivity and inappropriate judgment.   Nursing note and vitals reviewed.      Recent Labs      06/28/18   0350  06/29/18   0515  06/30/18   0650   WBC  7.3  6.3  5.2   RBC  3.72*  3.56*  3.39*   HEMOGLOBIN  12.6*  12.0*  11.5*   HEMATOCRIT  35.7*  35.0*  33.6*   MCV  96.0  98.3*  99.1*   MCH  33.9*  33.7*  33.9*   MCHC  35.3  34.3  34.2   RDW  47.5  49.2  50.3*   PLATELETCT  58*  74*  85*   MPV  10.9  12.2  11.1     Recent Labs      06/28/18   0350  06/29/18   0515  06/30/18   0500   SODIUM  138  140  140   POTASSIUM  3.5*  3.3*  4.2   CHLORIDE  103  105  104   CO2  27  29  26   GLUCOSE  90  119*  112*   BUN  3*  4*  6*   CREATININE  0.65  0.59  0.45*   CALCIUM  8.6  8.5  8.7                      Assessment/Plan     * Delirium tremens (HCC)- (present on admission)   Assessment & Plan    Remains on phenobarbital protocol      Continue thiamine and folic acid  Aspiration precautions  Close clinical monitoring in the intensive care unit          Facial trauma- (present on admission)   Assessment & Plan    With orbital fracture and anisocoria  Dr. Terrazas recommended outpatient follow-up for treatment of his fracture after swelling has improved  Evaluated by ophthalmology who recommended clinical monitoring and outpatient follow-up    Given sinus hemorrhage  patient and increased risk of sinusitis patient was started on Augmentin to complete 10 day course  Continue close clinical monitoring          Alcohol abuse- (present on admission)   Assessment & Plan     on cessation when more alert        Thrombocytopenia (HCC)- (present on admission)   Assessment & Plan    Likely secondary to alcoholism    Platelet counts improving now at 85K        Electrolyte abnormality   Assessment & Plan              Alcohol withdrawal seizure with delirium (HCC)   Assessment & Plan    Continue phenobarbital protocol          Tobacco use- (present on admission)   Assessment & Plan    Nicotine replacement          Quality-Core Measures   Reviewed items::  Medications reviewed, Labs reviewed and Radiology images reviewed  Joshua catheter::  Critically Ill - Requiring Accurate Measurement of Urinary Output  DVT prophylaxis pharmacological::  Contraindicated - High bleeding risk  DVT prophylaxis - mechanical:  SCDs  Ulcer Prophylaxis::  No  Antibiotics:  Treating active infection/contamination beyond 24 hours perioperative coverage      Patient discussed with nursing staff pharmacist and Dr. Martins

## 2018-07-01 ENCOUNTER — APPOINTMENT (OUTPATIENT)
Dept: RADIOLOGY | Facility: MEDICAL CENTER | Age: 38
DRG: 988 | End: 2018-07-01
Attending: HOSPITALIST
Payer: COMMERCIAL

## 2018-07-01 LAB
ALBUMIN SERPL BCP-MCNC: 3.3 G/DL (ref 3.2–4.9)
ALBUMIN SERPL BCP-MCNC: 3.7 G/DL (ref 3.2–4.9)
ALBUMIN/GLOB SERPL: 1.1 G/DL
ALBUMIN/GLOB SERPL: 1.2 G/DL
ALP SERPL-CCNC: 53 U/L (ref 30–99)
ALP SERPL-CCNC: 69 U/L (ref 30–99)
ALT SERPL-CCNC: 20 U/L (ref 2–50)
ALT SERPL-CCNC: 24 U/L (ref 2–50)
ANION GAP SERPL CALC-SCNC: 8 MMOL/L (ref 0–11.9)
ANION GAP SERPL CALC-SCNC: 9 MMOL/L (ref 0–11.9)
AST SERPL-CCNC: 22 U/L (ref 12–45)
AST SERPL-CCNC: 48 U/L (ref 12–45)
BASOPHILS # BLD AUTO: 1.2 % (ref 0–1.8)
BASOPHILS # BLD: 0.06 K/UL (ref 0–0.12)
BILIRUB SERPL-MCNC: 0.5 MG/DL (ref 0.1–1.5)
BILIRUB SERPL-MCNC: 0.6 MG/DL (ref 0.1–1.5)
BUN SERPL-MCNC: 5 MG/DL (ref 8–22)
BUN SERPL-MCNC: 6 MG/DL (ref 8–22)
CALCIUM SERPL-MCNC: 8.3 MG/DL (ref 8.5–10.5)
CALCIUM SERPL-MCNC: 9.3 MG/DL (ref 8.5–10.5)
CHLORIDE SERPL-SCNC: 104 MMOL/L (ref 96–112)
CHLORIDE SERPL-SCNC: 106 MMOL/L (ref 96–112)
CO2 SERPL-SCNC: 24 MMOL/L (ref 20–33)
CO2 SERPL-SCNC: 27 MMOL/L (ref 20–33)
CREAT SERPL-MCNC: 0.5 MG/DL (ref 0.5–1.4)
CREAT SERPL-MCNC: 0.51 MG/DL (ref 0.5–1.4)
EOSINOPHIL # BLD AUTO: 0.13 K/UL (ref 0–0.51)
EOSINOPHIL NFR BLD: 2.7 % (ref 0–6.9)
ERYTHROCYTE [DISTWIDTH] IN BLOOD BY AUTOMATED COUNT: 48.1 FL (ref 35.9–50)
GLOBULIN SER CALC-MCNC: 3 G/DL (ref 1.9–3.5)
GLOBULIN SER CALC-MCNC: 3.1 G/DL (ref 1.9–3.5)
GLUCOSE SERPL-MCNC: 112 MG/DL (ref 65–99)
GLUCOSE SERPL-MCNC: 119 MG/DL (ref 65–99)
HCT VFR BLD AUTO: 37.9 % (ref 42–52)
HGB BLD-MCNC: 12.9 G/DL (ref 14–18)
IMM GRANULOCYTES # BLD AUTO: 0.02 K/UL (ref 0–0.11)
IMM GRANULOCYTES NFR BLD AUTO: 0.4 % (ref 0–0.9)
LYMPHOCYTES # BLD AUTO: 0.73 K/UL (ref 1–4.8)
LYMPHOCYTES NFR BLD: 15 % (ref 22–41)
MAGNESIUM SERPL-MCNC: 2.1 MG/DL (ref 1.5–2.5)
MCH RBC QN AUTO: 33.2 PG (ref 27–33)
MCHC RBC AUTO-ENTMCNC: 34 G/DL (ref 33.7–35.3)
MCV RBC AUTO: 97.4 FL (ref 81.4–97.8)
MONOCYTES # BLD AUTO: 0.87 K/UL (ref 0–0.85)
MONOCYTES NFR BLD AUTO: 17.9 % (ref 0–13.4)
NEUTROPHILS # BLD AUTO: 3.05 K/UL (ref 1.82–7.42)
NEUTROPHILS NFR BLD: 62.8 % (ref 44–72)
NRBC # BLD AUTO: 0 K/UL
NRBC BLD-RTO: 0 /100 WBC
PLATELET # BLD AUTO: 118 K/UL (ref 164–446)
PMV BLD AUTO: 10.9 FL (ref 9–12.9)
POTASSIUM SERPL-SCNC: 3.9 MMOL/L (ref 3.6–5.5)
POTASSIUM SERPL-SCNC: 6.4 MMOL/L (ref 3.6–5.5)
PROT SERPL-MCNC: 6.3 G/DL (ref 6–8.2)
PROT SERPL-MCNC: 6.8 G/DL (ref 6–8.2)
RBC # BLD AUTO: 3.89 M/UL (ref 4.7–6.1)
SODIUM SERPL-SCNC: 139 MMOL/L (ref 135–145)
SODIUM SERPL-SCNC: 139 MMOL/L (ref 135–145)
WBC # BLD AUTO: 4.9 K/UL (ref 4.8–10.8)

## 2018-07-01 PROCEDURE — 85025 COMPLETE CBC W/AUTO DIFF WBC: CPT

## 2018-07-01 PROCEDURE — 700101 HCHG RX REV CODE 250: Performed by: FAMILY MEDICINE

## 2018-07-01 PROCEDURE — 700102 HCHG RX REV CODE 250 W/ 637 OVERRIDE(OP): Performed by: HOSPITALIST

## 2018-07-01 PROCEDURE — 700111 HCHG RX REV CODE 636 W/ 250 OVERRIDE (IP): Performed by: HOSPITALIST

## 2018-07-01 PROCEDURE — 700111 HCHG RX REV CODE 636 W/ 250 OVERRIDE (IP): Performed by: INTERNAL MEDICINE

## 2018-07-01 PROCEDURE — 83735 ASSAY OF MAGNESIUM: CPT

## 2018-07-01 PROCEDURE — A9270 NON-COVERED ITEM OR SERVICE: HCPCS | Performed by: HOSPITALIST

## 2018-07-01 PROCEDURE — 770022 HCHG ROOM/CARE - ICU (200)

## 2018-07-01 PROCEDURE — A9270 NON-COVERED ITEM OR SERVICE: HCPCS | Performed by: FAMILY MEDICINE

## 2018-07-01 PROCEDURE — 99232 SBSQ HOSP IP/OBS MODERATE 35: CPT | Performed by: HOSPITALIST

## 2018-07-01 PROCEDURE — 99291 CRITICAL CARE FIRST HOUR: CPT | Performed by: INTERNAL MEDICINE

## 2018-07-01 PROCEDURE — 700102 HCHG RX REV CODE 250 W/ 637 OVERRIDE(OP): Performed by: INTERNAL MEDICINE

## 2018-07-01 PROCEDURE — 80053 COMPREHEN METABOLIC PANEL: CPT | Mod: 91

## 2018-07-01 PROCEDURE — A9270 NON-COVERED ITEM OR SERVICE: HCPCS | Performed by: INTERNAL MEDICINE

## 2018-07-01 PROCEDURE — 700102 HCHG RX REV CODE 250 W/ 637 OVERRIDE(OP): Performed by: FAMILY MEDICINE

## 2018-07-01 RX ORDER — ONDANSETRON 4 MG/1
4 TABLET, ORALLY DISINTEGRATING ORAL EVERY 4 HOURS PRN
Status: DISCONTINUED | OUTPATIENT
Start: 2018-07-01 | End: 2018-07-06 | Stop reason: HOSPADM

## 2018-07-01 RX ORDER — AMOXICILLIN AND CLAVULANATE POTASSIUM 875; 125 MG/1; MG/1
1 TABLET, FILM COATED ORAL EVERY 12 HOURS
Status: DISCONTINUED | OUTPATIENT
Start: 2018-07-01 | End: 2018-07-06 | Stop reason: HOSPADM

## 2018-07-01 RX ADMIN — PHENOBARBITAL SODIUM 260 MG: 130 INJECTION INTRAMUSCULAR; INTRAVENOUS at 22:16

## 2018-07-01 RX ADMIN — NICOTINE 14 MG: 14 PATCH, EXTENDED RELEASE TRANSDERMAL at 06:00

## 2018-07-01 RX ADMIN — AMOXICILLIN AND CLAVULANATE POTASSIUM 1 TABLET: 875; 125 TABLET, FILM COATED ORAL at 09:00

## 2018-07-01 RX ADMIN — PHENOBARBITAL SODIUM 260 MG: 130 INJECTION INTRAMUSCULAR; INTRAVENOUS at 00:45

## 2018-07-01 RX ADMIN — PHENOBARBITAL SODIUM 260 MG: 130 INJECTION INTRAMUSCULAR; INTRAVENOUS at 13:20

## 2018-07-01 RX ADMIN — HYDROCODONE BITARTRATE AND ACETAMINOPHEN 1 TABLET: 5; 325 TABLET ORAL at 08:00

## 2018-07-01 RX ADMIN — Medication 1 EACH: at 07:48

## 2018-07-01 RX ADMIN — AMOXICILLIN AND CLAVULANATE POTASSIUM 1 TABLET: 875; 125 TABLET, FILM COATED ORAL at 21:00

## 2018-07-01 RX ADMIN — HYDROCODONE BITARTRATE AND ACETAMINOPHEN 1 TABLET: 5; 325 TABLET ORAL at 18:00

## 2018-07-01 RX ADMIN — PHENOBARBITAL SODIUM 260 MG: 130 INJECTION INTRAMUSCULAR; INTRAVENOUS at 14:00

## 2018-07-01 RX ADMIN — PHENOBARBITAL SODIUM 260 MG: 130 INJECTION INTRAMUSCULAR; INTRAVENOUS at 17:30

## 2018-07-01 RX ADMIN — ENOXAPARIN SODIUM 40 MG: 100 INJECTION SUBCUTANEOUS at 11:00

## 2018-07-01 RX ADMIN — FENTANYL CITRATE 50 MCG: 50 INJECTION, SOLUTION INTRAMUSCULAR; INTRAVENOUS at 16:00

## 2018-07-01 RX ADMIN — PHENOBARBITAL SODIUM 260 MG: 130 INJECTION INTRAMUSCULAR; INTRAVENOUS at 16:00

## 2018-07-01 RX ADMIN — PHENOBARBITAL SODIUM 260 MG: 130 INJECTION INTRAMUSCULAR; INTRAVENOUS at 22:45

## 2018-07-01 RX ADMIN — FENTANYL CITRATE 50 MCG: 50 INJECTION, SOLUTION INTRAMUSCULAR; INTRAVENOUS at 22:23

## 2018-07-01 RX ADMIN — Medication 1 EACH: at 20:35

## 2018-07-01 ASSESSMENT — LIFESTYLE VARIABLES
VISUAL DISTURBANCES: NOT PRESENT
HEADACHE, FULLNESS IN HEAD: NOT PRESENT
SUBSTANCE_ABUSE: 1
PAROXYSMAL SWEATS: NO SWEAT VISIBLE
VISUAL DISTURBANCES: NOT PRESENT
TOTAL SCORE: 4
AGITATION: SOMEWHAT MORE THAN NORMAL ACTIVITY
TREMOR: NO TREMOR
ORIENTATION AND CLOUDING OF SENSORIUM: CANNOT DO SERIAL ADDITIONS OR IS UNCERTAIN ABOUT DATE
NAUSEA AND VOMITING: NO NAUSEA AND NO VOMITING
TOTAL SCORE: 10
HEADACHE, FULLNESS IN HEAD: VERY MILD
ORIENTATION AND CLOUDING OF SENSORIUM: DISORIENTED FOR PLACE AND / OR PERSON
AGITATION: NORMAL ACTIVITY
HEADACHE, FULLNESS IN HEAD: NOT PRESENT
ANXIETY: MILDLY ANXIOUS
TREMOR: NO TREMOR
AUDITORY DISTURBANCES: NOT PRESENT
NAUSEA AND VOMITING: NO NAUSEA AND NO VOMITING
PAROXYSMAL SWEATS: NO SWEAT VISIBLE
NAUSEA AND VOMITING: NO NAUSEA AND NO VOMITING
TREMOR: *
PAROXYSMAL SWEATS: BARELY PERCEPTIBLE SWEATING. PALMS MOIST
VISUAL DISTURBANCES: NOT PRESENT
TOTAL SCORE: 4
ORIENTATION AND CLOUDING OF SENSORIUM: DISORIENTED FOR PLACE AND / OR PERSON
AGITATION: *
ANXIETY: MILDLY ANXIOUS
AUDITORY DISTURBANCES: NOT PRESENT
ANXIETY: NO ANXIETY (AT EASE)
AUDITORY DISTURBANCES: NOT PRESENT

## 2018-07-01 ASSESSMENT — PATIENT HEALTH QUESTIONNAIRE - PHQ9
2. FEELING DOWN, DEPRESSED, IRRITABLE, OR HOPELESS: NOT AT ALL
SUM OF ALL RESPONSES TO PHQ9 QUESTIONS 1 AND 2: 0
1. LITTLE INTEREST OR PLEASURE IN DOING THINGS: NOT AT ALL

## 2018-07-01 NOTE — PROGRESS NOTES
[facility] Hospitalist Progress Note    Date of Service: 2018    Chief Complaint  37 y.o. male admitted 2018 with alcohol withdrawal seizure, facial trauma.    Interval Problem Update    pneobarb 260 x2  Still confused  SR 80  Afebrile  TF at goal  Had BM last night  On RA              Consultants/Specialty  Plastic Surgery - [place]  Ophthalmology  CC  Disposition  TBD        Review of Systems   Unable to perform ROS: Mental status change      Physical Exam  Laboratory/Imaging   Hemodynamics  No data recorded.   Monitored Temp: 37.5 °C (99.5 °F)  Pulse  Av.8  Min: 42  Max: 177 Heart Rate (Monitored): 79  NIBP: 111/66      Respiratory      Respiration: 15, Pulse Oximetry: 98 %     Work Of Breathing / Effort: Mild  RUL Breath Sounds: Rhonchi, RML Breath Sounds: Diminished, RLL Breath Sounds: Diminished, LOKESH Breath Sounds: Rhonchi, LLL Breath Sounds: Diminished    Fluids    Intake/Output Summary (Last 24 hours) at 18 0909  Last data filed at 18 0800   Gross per 24 hour   Intake             2500 ml   Output             2420 ml   Net               80 ml       Nutrition  Orders Placed This Encounter   Procedures   • DIET NPO     Standing Status:   Standing     Number of Occurrences:   1     Order Specific Question:   Restrict to:     Answer:   Strict [1]     Physical Exam   Constitutional: He appears well-developed and well-nourished.   HENT:   Head: Normocephalic.   Right Ear: External ear normal.   Left Ear: External ear normal.   Mouth/Throat: No oropharyngeal exudate.   Core track in place  Facial edema and ecchymosis improving     Eyes: Conjunctivae are normal. Pupils are equal, round, and reactive to light. Right eye exhibits no discharge. Left eye exhibits no discharge. No scleral icterus.   Left periorbital ecchymosis and edema improved     Neck: Neck supple. No JVD present. No tracheal deviation present.   Cardiovascular: Normal rate and regular rhythm.  Exam reveals no gallop and no friction  rub.    No murmur heard.  Pulmonary/Chest: Effort normal. No stridor. He has no wheezes. He has rhonchi. He has no rales. He exhibits no tenderness.   Abdominal: Soft. Bowel sounds are normal. He exhibits no distension. There is no tenderness. There is no rebound.   Musculoskeletal: He exhibits edema.   Neurological: No cranial nerve deficit. He exhibits normal muscle tone.   Lethargic does not follow command     Skin: Skin is warm and dry. He is not diaphoretic. No erythema.   Psychiatric: He is slowed. Cognition and memory are impaired. He expresses impulsivity and inappropriate judgment. He exhibits abnormal recent memory and abnormal remote memory.   Nursing note and vitals reviewed.      Recent Labs      06/29/18   0515  06/30/18   0650  07/01/18   0700   WBC  6.3  5.2  4.9   RBC  3.56*  3.39*  3.89*   HEMOGLOBIN  12.0*  11.5*  12.9*   HEMATOCRIT  35.0*  33.6*  37.9*   MCV  98.3*  99.1*  97.4   MCH  33.7*  33.9*  33.2*   MCHC  34.3  34.2  34.0   RDW  49.2  50.3*  48.1   PLATELETCT  74*  85*  118*   MPV  12.2  11.1  10.9     Recent Labs      06/30/18   0500  07/01/18   0500  07/01/18   0700   SODIUM  140  139  139   POTASSIUM  4.2  6.4*  3.9   CHLORIDE  104  106  104   CO2  26  24  27   GLUCOSE  112*  119*  112*   BUN  6*  5*  6*   CREATININE  0.45*  0.51  0.50   CALCIUM  8.7  8.3*  9.3                      Assessment/Plan     * Delirium tremens (HCC)- (present on admission)   Assessment & Plan    Remains on phenobarbital protocol    Improved requirements of phenobarbital  Continue thiamine and folic acid  Aspiration precautions  Continue close clinical monitoring in the intensive care unit          Facial trauma- (present on admission)   Assessment & Plan    With orbital fracture and anisocoria  Dr. Terrazas recommended outpatient follow-up for treatment of his fracture after swelling has improved  Evaluated by ophthalmology who recommended clinical monitoring and outpatient follow-up    Complete 10 day course of  Augmentin as patient at high risk of sinusitis          Alcohol abuse- (present on admission)   Assessment & Plan     on cessation when more alert        Thrombocytopenia (HCC)- (present on admission)   Assessment & Plan    Likely secondary to alcoholism    Continue to monitor CBC  Counts are improving        Electrolyte abnormality   Assessment & Plan              Alcohol withdrawal seizure with delirium (HCC)   Assessment & Plan    On phenobarbital protocol          Tobacco use- (present on admission)   Assessment & Plan    Nicotine replacement          Quality-Core Measures   Reviewed items::  Medications reviewed, Labs reviewed and Radiology images reviewed  Joshua catheter::  Critically Ill - Requiring Accurate Measurement of Urinary Output  DVT prophylaxis pharmacological::  Enoxaparin (Lovenox)  DVT prophylaxis - mechanical:  SCDs  Ulcer Prophylaxis::  No  Antibiotics:  Treating active infection/contamination beyond 24 hours perioperative coverage      Patient discussed with nursing staff pharmacist and Dr. Martins

## 2018-07-01 NOTE — PROGRESS NOTES
Pulmonary Critical Care Progress Note        Admit: 6/26/2018  Date of Service: 7/1/2018  Chief Complaint: severe agitation and alcohol withdrawal seizures    History of Present Illness: 37 y.o. male with a history of alcohol abuse was admitted for alcohol withdrawal seizures and severe alcohol withdrawal syndrome     Review of Systems   Unable to perform ROS: Acuity of condition   Psychiatric/Behavioral: Positive for substance abuse.       Interval Events:  24 hour interval history reviewed    - no events overnight   - pheno still overnight   - still confused   - SR 80s   - -120s   - Tmax 99.0   - BM last night   - UOP of 1400cc overnight in aguirre   - no CXR today   - room air   - day #3/10 of Augmentin   - K at 3.9   - pheno 1190mg over 24 hours   - will start lovenox    Yesterday's Events:   - no events overnight   - still required pheno overnight   - mumbling and withdraws to pain   - SR 80s   - SBP 90-110s   - TFs at goal   - UOP of 700cc overnight with aguirre   - no RT issues   - no CXR today   - SCDs   - day #2 of 10 of Augmentin   - afebrile   - WBCs at 6   - phenobarb of 900mg in 24 hours    PFSH:  No change.    Physical Exam   Constitutional: He appears well-developed. No distress.   Older than stated age and disheveled in appearance   HENT:   Right Ear: External ear normal.   Left Ear: External ear normal.   Nose: Nose normal.   Mouth/Throat: Oropharynx is clear and moist. No oropharyngeal exudate.   Eyes: No scleral icterus.   Right pupil reactive to light, left weakly reactive and about 6-7mm, unable to assess EOMI, significant amount of swelling and bruising to left periorbital region and over zygomatic arch     Neck: Normal range of motion. Neck supple. No thyromegaly present.   Significant bruising to left neck region appears to be improving   Cardiovascular: Normal rate, regular rhythm, normal heart sounds and intact distal pulses.    No murmur heard.  Pulmonary/Chest: Effort normal and breath  sounds normal. No respiratory distress. He exhibits no tenderness.   Abdominal: Soft. Bowel sounds are normal. He exhibits no distension and no mass. There is no tenderness.   Musculoskeletal: Normal range of motion. He exhibits no edema or tenderness.   Lymphadenopathy:     He has no cervical adenopathy.   Neurological: No cranial nerve deficit or sensory deficit. He exhibits normal muscle tone. Coordination normal.   Sedated, but mumbling slurred speech   Skin: Skin is warm and dry. Capillary refill takes less than 2 seconds. No rash noted. He is not diaphoretic.   Psychiatric:   Unable to assess   Nursing note and vitals reviewed.  no change to exam    Respiratory:     Pulse Oximetry: 100 %    HemoDynamics:  Pulse: 70, Heart Rate (Monitored): 70  NIBP: (!) 97/58         Neuro:    Fluids:  Intake/Output       06/29/18 0700 - 06/30/18 0659 06/30/18 0700 - 07/01/18 0659 07/01/18 0700 - 07/02/18 0659      0559-6757 9665-9182 Total 0206-9636 2593-2785 Total 0113-3537 8663-5977 Total       Intake    I.V.  500  600 1100  550  50 600  --  -- --    IV Volume (normal saline 20 KCL)  550 50 600 -- -- --    Other  30  30 60  60  -- 60  --  -- --    Medications (P.O./ Enteral Liquids) 30 30 60 60 -- 60 -- -- --    Enteral  850  960 1810  900  900 1800  --  -- --    Free Water / Tube Flush 30 120 150 60 200 260 -- -- --    Intake (mL) (Enteral Tube Right Nare Cortrak Small Bowel Feeding Tube)   -- -- --    Total Intake 1380 1590 2970 2040 150 2052 -- -- --       Output    Urine  780  900 1680  820  1200 2020  --  -- --    Output (mL) (Urinary Catheter Indwelling Catheter 16)  820 1200 2020 -- -- --    Drains  0  -- 0  0  -- 0  --  -- --    Residual Amount (ml) (Discarded) 0 -- 0 0 -- 0 -- -- --    Stool  --  -- --  --  -- --  --  -- --    Number of Times Stooled 1 x 1 x 2 x -- -- -- -- -- --    Total Output 541 [phone] 820 1200 2020 -- -- --       Net I/O     [phone] 690  -250 440 -- -- --           Recent Labs      18   0515  18   0500   SODIUM  140  140   POTASSIUM  3.3*  4.2   CHLORIDE  105  104   CO2  29  26   BUN  4*  6*   CREATININE  0.59  0.45*   MAGNESIUM  2.0   --    PHOSPHORUS   --   3.2   CALCIUM  8.5  8.7       GI/Nutrition:    Liver Function  Recent Labs      18   0515  18   0500   ALTSGPT  26  24   ASTSGOT  34  27   ALKPHOSPHAT  53  67   TBILIRUBIN  0.7  0.7   GLUCOSE  119*  112*       Heme:  Recent Labs      18   0515  18   0650   RBC  3.56*  3.39*   HEMOGLOBIN  12.0*  11.5*   HEMATOCRIT  35.0*  33.6*   PLATELETCT  74*  85*       Infectious Disease:  Monitored Temp 2  Av.2 °C (98.9 °F)  Min: 36.7 °C (98.1 °F)  Max: 37.6 °C (99.7 °F)  Micro: reviewed  Recent Labs      18   0515  18   0500  18   0650   WBC  6.3   --   5.2   NEUTSPOLYS  78.30*   --   73.00*   LYMPHOCYTES  9.80*   --   11.50*   MONOCYTES  9.90   --   13.00   EOSINOPHILS  1.10   --   1.30   BASOPHILS  0.60   --   0.60   ASTSGOT  34  27   --    ALTSGPT  26  24   --    ALKPHOSPHAT  53  67   --    TBILIRUBIN  0.7  0.7   --      Current Facility-Administered Medications   Medication Dose Frequency Provider Last Rate Last Dose   • amoxicillin-clavulanate (AUGMENTIN) 875-125 MG per tablet 1 Tab  1 Tab Q12HRS Maco Brown M.D.   1 Tab at 18 2100   • MD ALERT...Adult ICU Electrolyte Replacement per Pharmacy Protocol   PRN Maco Brown M.D.       • MD ALERT...Phenobarbital Alcohol Withdrawal Protocol Pharmacist to Implement (ICU Only)   MARQUIS Upton M.D.       • Pharmacy Consult Request...Benzodiazepine Review  1 Each PRN Elijah Upton M.D.        And   • PHENObarbital injection 130 mg  130 mg Q30 MIN PRISAIAH Upton M.D.   130 mg at 18 1552    And   • PHENObarbital injection 260 mg  260 mg Q30 MIN PRN Elijah Upton M.D.   260 mg at 18 0045   • Pharmacy Consult: Enteral tube feeding - review meds/change route/product  selection  1 Each PRN Maco Brown M.D.       • senna-docusate (PERICOLACE or SENOKOT S) 8.6-50 MG per tablet 2 Tab  2 Tab BID Kristin Martins M.D.   2 Tab at 06/30/18 2030    And   • polyethylene glycol/lytes (MIRALAX) PACKET 1 Packet  1 Packet QDAY PRN Kristin Martins M.D.        And   • magnesium hydroxide (MILK OF MAGNESIA) suspension 30 mL  30 mL QDAY PRN Kristin Martins M.D.        And   • bisacodyl (DULCOLAX) suppository 10 mg  10 mg QDAY PRN Kristin Martins M.D.       • HYDROcodone-acetaminophen (NORCO) 5-325 MG per tablet 1 Tab  1 Tab Q6HRS PRN Kristin Martins M.D.   1 Tab at 06/30/18 1600   • promethazine (PHENERGAN) tablet 12.5-25 mg  12.5-25 mg Q4HRS PRN Kristin Martins M.D.       • fentaNYL (SUBLIMAZE) injection 50 mcg  50 mcg Q HOUR PRN Kaushal Lawler D.O.   50 mcg at 06/28/18 1007   • enalaprilat (VASOTEC) injection 1.25 mg  1.25 mg Q6HRS PRN Jose Alfredo Domingo M.D.       • ondansetron (ZOFRAN) syringe/vial injection 4 mg  4 mg Q4HRS PRN Jsoe Alfredo Domingo M.D.       • ondansetron (ZOFRAN ODT) dispertab 4 mg  4 mg Q4HRS PRN Jose Alfredo Domingo M.D.       • promethazine (PHENERGAN) suppository 12.5-25 mg  12.5-25 mg Q4HRS PRN Jose Alfredo Domingo M.D.       • prochlorperazine (COMPAZINE) injection 5-10 mg  5-10 mg Q4HRS PRN Jose Alfredo Domingo M.D.       • nicotine (NICODERM) 14 MG/24HR 14 mg  14 mg Daily-0600 Jose Alfredo Domingo M.D.   14 mg at 06/30/18 0449    And   • nicotine polacrilex (NICORETTE) 2 MG piece 2 mg  2 mg Q HOUR PRN Jose Alfredo Domingo M.D.       • bacitracin-polymyxin b (POLYSPORIN) 500-19611 UNIT/GM ointment   BID Jhonatan Norwood M.D.   1 Each at 06/30/18 2030     Last reviewed on 6/25/2018  9:29 PM by Yazmin Zhang [facility]    Quality  Measures:  Medications reviewed, EKG reviewed, Labs reviewed and Radiology images reviewed  Joshua catheter: Critically Ill - Requiring Accurate Measurement of Urinary Output      DVT Prophylaxis: Contraindicated - High bleeding risk  DVT  prophylaxis - mechanical: SCDs  Ulcer prophylaxis: Not indicated        Problems/Plan:    Acute Alcohol Withdrawal Syndrome   - continuing phenobarbital protocol   - continuous airway management and watching for need for intubation   - s/p Rally bag   - cont MVI, thiamine, and folate   - will need counseling on quitting alcohol once over withdrawal  Acute Alcohol Withdrawal Seizure   - still no seizure activity   - cont seizure precautions   - cont phenobarb protocol  Acute Left Orbital Fracture   - plastics discussed in ER and no surgical intervention   - no eye involvement   - cont cold packs and tobramycin to left eye  Acute Left Medial/Lateral Maxillary Wall Fractures   - no surgical intervention at this time   - cont augmentin 875mg BID for 10 days  Thrombocytopenia   - holding VTE prophylaxis   - following  Hypokalemia   - repleted  Hypomagnesemia   - repleted  Alcohol Abuse   - needs counseling  Prophylaxis   - SCDs, enteral feedings    Pt remains critically ill with unstable neuro process still requiring large doses of phenobarbital on the protocol and continuous airway evaluation/monitoring for need for intubation.  He is at high risk of clinical deterioration, worsening vital organ dysfunction, and death without the above critical care interventions.    Discussed patient condition and risk of morbidity and/or mortality with RN, RT, Therapies, Pharmacy, Dietary, , Charge nurse / hot rounds and hospitalist.  The patient remains critically ill.  Critical care time = 32 minutes in directly providing and coordinating critical care and extensive data review.  No time overlap and excludes procedures.

## 2018-07-02 PROBLEM — D69.6 THROMBOCYTOPENIA (HCC): Status: RESOLVED | Noted: 2018-06-26 | Resolved: 2018-07-02

## 2018-07-02 PROBLEM — D61.818 PANCYTOPENIA (HCC): Status: ACTIVE | Noted: 2018-07-02

## 2018-07-02 LAB
ALBUMIN SERPL BCP-MCNC: 3.1 G/DL (ref 3.2–4.9)
ALBUMIN/GLOB SERPL: 1 G/DL
ALP SERPL-CCNC: 50 U/L (ref 30–99)
ALT SERPL-CCNC: 16 U/L (ref 2–50)
ANION GAP SERPL CALC-SCNC: 10 MMOL/L (ref 0–11.9)
AST SERPL-CCNC: 20 U/L (ref 12–45)
BASOPHILS # BLD AUTO: 0.7 % (ref 0–1.8)
BASOPHILS # BLD: 0.03 K/UL (ref 0–0.12)
BILIRUB SERPL-MCNC: 0.4 MG/DL (ref 0.1–1.5)
BUN SERPL-MCNC: 8 MG/DL (ref 8–22)
CALCIUM SERPL-MCNC: 9 MG/DL (ref 8.5–10.5)
CHLORIDE SERPL-SCNC: 106 MMOL/L (ref 96–112)
CO2 SERPL-SCNC: 23 MMOL/L (ref 20–33)
CREAT SERPL-MCNC: 0.44 MG/DL (ref 0.5–1.4)
CRP SERPL HS-MCNC: 5.03 MG/DL (ref 0–0.75)
EKG IMPRESSION: NORMAL
EOSINOPHIL # BLD AUTO: 0.15 K/UL (ref 0–0.51)
EOSINOPHIL NFR BLD: 3.4 % (ref 0–6.9)
ERYTHROCYTE [DISTWIDTH] IN BLOOD BY AUTOMATED COUNT: 48.9 FL (ref 35.9–50)
GLOBULIN SER CALC-MCNC: 3.1 G/DL (ref 1.9–3.5)
GLUCOSE SERPL-MCNC: 102 MG/DL (ref 65–99)
HCT VFR BLD AUTO: 37.7 % (ref 42–52)
HGB BLD-MCNC: 12.9 G/DL (ref 14–18)
IMM GRANULOCYTES # BLD AUTO: 0.02 K/UL (ref 0–0.11)
IMM GRANULOCYTES NFR BLD AUTO: 0.5 % (ref 0–0.9)
LYMPHOCYTES # BLD AUTO: 0.86 K/UL (ref 1–4.8)
LYMPHOCYTES NFR BLD: 19.5 % (ref 22–41)
MCH RBC QN AUTO: 33.8 PG (ref 27–33)
MCHC RBC AUTO-ENTMCNC: 34.2 G/DL (ref 33.7–35.3)
MCV RBC AUTO: 98.7 FL (ref 81.4–97.8)
MONOCYTES # BLD AUTO: 0.92 K/UL (ref 0–0.85)
MONOCYTES NFR BLD AUTO: 20.8 % (ref 0–13.4)
NEUTROPHILS # BLD AUTO: 2.44 K/UL (ref 1.82–7.42)
NEUTROPHILS NFR BLD: 55.1 % (ref 44–72)
NRBC # BLD AUTO: 0 K/UL
NRBC BLD-RTO: 0 /100 WBC
PLATELET # BLD AUTO: 149 K/UL (ref 164–446)
PMV BLD AUTO: 10.7 FL (ref 9–12.9)
POTASSIUM SERPL-SCNC: 3.6 MMOL/L (ref 3.6–5.5)
PREALB SERPL-MCNC: 15 MG/DL (ref 18–38)
PROT SERPL-MCNC: 6.2 G/DL (ref 6–8.2)
RBC # BLD AUTO: 3.82 M/UL (ref 4.7–6.1)
SODIUM SERPL-SCNC: 139 MMOL/L (ref 135–145)
WBC # BLD AUTO: 4.4 K/UL (ref 4.8–10.8)

## 2018-07-02 PROCEDURE — 700102 HCHG RX REV CODE 250 W/ 637 OVERRIDE(OP): Performed by: INTERNAL MEDICINE

## 2018-07-02 PROCEDURE — A9270 NON-COVERED ITEM OR SERVICE: HCPCS | Performed by: INTERNAL MEDICINE

## 2018-07-02 PROCEDURE — 700101 HCHG RX REV CODE 250: Performed by: FAMILY MEDICINE

## 2018-07-02 PROCEDURE — 770022 HCHG ROOM/CARE - ICU (200)

## 2018-07-02 PROCEDURE — 700102 HCHG RX REV CODE 250 W/ 637 OVERRIDE(OP): Performed by: HOSPITALIST

## 2018-07-02 PROCEDURE — 700102 HCHG RX REV CODE 250 W/ 637 OVERRIDE(OP): Performed by: FAMILY MEDICINE

## 2018-07-02 PROCEDURE — 84134 ASSAY OF PREALBUMIN: CPT

## 2018-07-02 PROCEDURE — 86140 C-REACTIVE PROTEIN: CPT

## 2018-07-02 PROCEDURE — 80053 COMPREHEN METABOLIC PANEL: CPT

## 2018-07-02 PROCEDURE — 700111 HCHG RX REV CODE 636 W/ 250 OVERRIDE (IP): Performed by: HOSPITALIST

## 2018-07-02 PROCEDURE — 99233 SBSQ HOSP IP/OBS HIGH 50: CPT | Performed by: HOSPITALIST

## 2018-07-02 PROCEDURE — 93005 ELECTROCARDIOGRAM TRACING: CPT | Performed by: INTERNAL MEDICINE

## 2018-07-02 PROCEDURE — 700101 HCHG RX REV CODE 250: Performed by: INTERNAL MEDICINE

## 2018-07-02 PROCEDURE — A9270 NON-COVERED ITEM OR SERVICE: HCPCS | Performed by: FAMILY MEDICINE

## 2018-07-02 PROCEDURE — 99291 CRITICAL CARE FIRST HOUR: CPT | Performed by: INTERNAL MEDICINE

## 2018-07-02 PROCEDURE — 93010 ELECTROCARDIOGRAM REPORT: CPT | Performed by: INTERNAL MEDICINE

## 2018-07-02 PROCEDURE — 85025 COMPLETE CBC W/AUTO DIFF WBC: CPT

## 2018-07-02 PROCEDURE — 700111 HCHG RX REV CODE 636 W/ 250 OVERRIDE (IP): Performed by: INTERNAL MEDICINE

## 2018-07-02 PROCEDURE — A9270 NON-COVERED ITEM OR SERVICE: HCPCS | Performed by: HOSPITALIST

## 2018-07-02 PROCEDURE — 700105 HCHG RX REV CODE 258: Performed by: INTERNAL MEDICINE

## 2018-07-02 RX ORDER — QUETIAPINE FUMARATE 25 MG/1
50 TABLET, FILM COATED ORAL EVERY 8 HOURS
Status: DISCONTINUED | OUTPATIENT
Start: 2018-07-02 | End: 2018-07-04

## 2018-07-02 RX ADMIN — AMOXICILLIN AND CLAVULANATE POTASSIUM 1 TABLET: 875; 125 TABLET, FILM COATED ORAL at 09:00

## 2018-07-02 RX ADMIN — Medication 1 EACH: at 07:55

## 2018-07-02 RX ADMIN — HYDROCODONE BITARTRATE AND ACETAMINOPHEN 1 TABLET: 5; 325 TABLET ORAL at 08:00

## 2018-07-02 RX ADMIN — QUETIAPINE FUMARATE 50 MG: 25 TABLET ORAL at 18:30

## 2018-07-02 RX ADMIN — NICOTINE 14 MG: 14 PATCH, EXTENDED RELEASE TRANSDERMAL at 05:04

## 2018-07-02 RX ADMIN — PHENOBARBITAL SODIUM 130 MG: 130 INJECTION INTRAMUSCULAR; INTRAVENOUS at 05:40

## 2018-07-02 RX ADMIN — QUETIAPINE FUMARATE 50 MG: 25 TABLET ORAL at 11:33

## 2018-07-02 RX ADMIN — DEXMEDETOMIDINE HYDROCHLORIDE 0.5 MCG/KG/HR: 100 INJECTION, SOLUTION INTRAVENOUS at 11:33

## 2018-07-02 RX ADMIN — POTASSIUM BICARBONATE 50 MEQ: 25 TABLET, EFFERVESCENT ORAL at 07:53

## 2018-07-02 RX ADMIN — PHENOBARBITAL SODIUM 260 MG: 130 INJECTION INTRAMUSCULAR; INTRAVENOUS at 05:04

## 2018-07-02 RX ADMIN — PHENOBARBITAL SODIUM 260 MG: 130 INJECTION INTRAMUSCULAR; INTRAVENOUS at 08:00

## 2018-07-02 RX ADMIN — Medication 1 EACH: at 20:11

## 2018-07-02 RX ADMIN — AMOXICILLIN AND CLAVULANATE POTASSIUM 1 TABLET: 875; 125 TABLET, FILM COATED ORAL at 21:00

## 2018-07-02 RX ADMIN — ENOXAPARIN SODIUM 40 MG: 100 INJECTION SUBCUTANEOUS at 07:53

## 2018-07-02 NOTE — PROGRESS NOTES
Pulmonary Critical Care Progress Note        Admit: 6/26/2018    Chief Complaint: severe agitation and alcohol withdrawal seizures    History of Present Illness: 37 y.o. male with a history of alcohol abuse was admitted for alcohol withdrawal seizures and severe alcohol withdrawal syndrome     Review of Systems   Unable to perform ROS: Acuity of condition       Interval Events:  24 hour interval history reviewed   Tm 99.7  +415cc over last 24hr, +2.6L since admit  No cxr this am  K 3.6  Cr 0.44    BM 6/29  Tolerating tube feeds  Only mumbles     Phenobarbital protocol day #6, > 1gram overnight  Augmentin     PFSH:  No change.    Physical Exam   Constitutional: He appears well-developed and well-nourished.   HENT:   Head: Normocephalic and atraumatic.   Eyes: Conjunctivae are normal.   Right pupil reactive to light, left weakly reactive and about 6-7mm, unable to assess EOMI, significant amount of swelling and bruising to left periorbital region and over zygomatic arch     Neck: No tracheal deviation present.   Significant bruising to left neck region  improving   Cardiovascular: Normal rate and regular rhythm.    Pulmonary/Chest: He has no wheezes. He has no rales.   Scattered rhonchi   Abdominal: Soft. He exhibits no distension. There is no tenderness.   Musculoskeletal: He exhibits no edema.   Neurological: No cranial nerve deficit.   Skin: Skin is warm and dry.   Psychiatric:   Unable to assess   Nursing note and vitals reviewed.      Respiratory:     Pulse Oximetry: 100 %    HemoDynamics:  Pulse: 85, Heart Rate (Monitored): 85  NIBP: (!) 96/60         Neuro:    Fluids:  Intake/Output       06/30/18 0700 - 07/01/18 0659 07/01/18 0700 - 07/02/18 0659 07/02/18 0700 - 07/03/18 0659      7288-9898 8289-9273 Total 7745-7568 1917-8954 Total 6859-3720 1256-7336 Total       Intake    I.V.  550  50 600  --  -- --  --  -- --    IV Volume (normal saline 20 KCL) 550 50 600 -- -- -- -- -- --    Other  60  -- 60  60  -- 60  --   -- --    Medications (P.O./ Enteral Liquids) 60 -- 60 60 -- 60 -- -- --    Enteral  900  1040 1940  480  840 1320  --  -- --    Free Water / Tube Flush 60 200 260 60 -- 60 -- -- --    Intake (mL) ([REMOVED] Enteral Tube Right Nare Cortrak Small Bowel Feeding Tube)  420 -- 420 -- -- --    Intake (mL) (Enteral Tube Left Nare Cortrak Gastric Feeding Tube) -- -- -- -- 840 840 -- -- --    Total Intake 1510 1090 2600  -- -- --       Output    Urine  820  1600 2420  430  535 965  --  -- --    Output (mL) (Urinary Catheter Indwelling Catheter 16) 820 1600 2420 430 535 965 -- -- --    Drains  0  -- 0  0  -- 0  --  -- --    Residual Amount (ml) (Discarded) 0 -- 0 0 -- 0 -- -- --    Total Output 820 1600 2420 430 535 965 -- -- --       Net I/O     690 -510 180 110 305 415 -- -- --           Recent Labs      18   0500  18   0500  18   0718   0443   SODIUM  140  139  139  139   POTASSIUM  4.2  6.4*  3.9  3.6   CHLORIDE  104  106  104  106   CO2  26  24  27  23   BUN  6*  5*  6*  8   CREATININE  0.45*  0.51  0.50  0.44*   MAGNESIUM   --   2.1   --    --    PHOSPHORUS  3.2   --    --    --    CALCIUM  8.7  8.3*  9.3  9.0       GI/Nutrition:    Liver Function  Recent Labs      18   0500  18   0718   0443   ALTSGPT  20  24  16   ASTSGOT  48*  22  20   ALKPHOSPHAT  53  69  50   TBILIRUBIN  0.6  0.5  0.4   PREALBUMIN   --    --   15.0*   GLUCOSE  119*  112*  102*       Heme:  Recent Labs      18   0650  18   0718   0443   RBC  3.39*  3.89*  3.82*   HEMOGLOBIN  11.5*  12.9*  12.9*   HEMATOCRIT  33.6*  37.9*  37.7*   PLATELETCT  85*  118*  149*       Infectious Disease:  Monitored Temp 2  Av °C (98.6 °F)  Min: 36.1 °C (97 °F)  Max: 37.6 °C (99.7 °F)  Temp  Av °C (96.8 °F)  Min: 36 °C (96.8 °F)  Max: 36 °C (96.8 °F)  Micro: reviewed  Recent Labs      18   0650  18   0500  18   0700  18   0443   WBC  5.2   --    4.9  4.4*   NEUTSPOLYS  73.00*   --   62.80  55.10   LYMPHOCYTES  11.50*   --   15.00*  19.50*   MONOCYTES  13.00   --   17.90*  20.80*   EOSINOPHILS  1.30   --   2.70  3.40   BASOPHILS  0.60   --   1.20  0.70   ASTSGOT   --   48*  22  20   ALTSGPT   --   20  24  16   ALKPHOSPHAT   --   53  69  50   TBILIRUBIN   --   0.6  0.5  0.4     Current Facility-Administered Medications   Medication Dose Frequency Provider Last Rate Last Dose   • enoxaparin (LOVENOX) inj 40 mg  40 mg DAILY Maco Brown M.D.   40 mg at 07/01/18 1100   • amoxicillin-clavulanate (AUGMENTIN) 875-125 MG per tablet 1 Tab  1 Tab Q12HRS Maco Brown M.D.   1 Tab at 07/01/18 2100   • ondansetron (ZOFRAN ODT) dispertab 4 mg  4 mg Q4HRS PRN Maco Brown M.D.       • MD ALERT...Adult ICU Electrolyte Replacement per Pharmacy Protocol   PRISAIAH Brown M.D.       • MD ALERT...Phenobarbital Alcohol Withdrawal Protocol Pharmacist to Implement (ICU Only)   MARQUIS Upton M.D.       • Pharmacy Consult Request...Benzodiazepine Review  1 Each PRN Elijah Upton M.D.        And   • PHENObarbital injection 130 mg  130 mg Q30 MIN PRISAIAH Upton M.D.   130 mg at 07/02/18 0540    And   • PHENObarbital injection 260 mg  260 mg Q30 MIN PRISAIAH Upton M.D.   260 mg at 07/02/18 0504   • Pharmacy Consult: Enteral tube feeding - review meds/change route/product selection  1 Each PRN Maco Brown M.D.       • senna-docusate (PERICOLACE or SENOKOT S) 8.6-50 MG per tablet 2 Tab  2 Tab BID Kristin Martins M.D.   Stopped at 07/01/18 0900    And   • polyethylene glycol/lytes (MIRALAX) PACKET 1 Packet  1 Packet QDAY PRN Kristin Martins M.D.        And   • magnesium hydroxide (MILK OF MAGNESIA) suspension 30 mL  30 mL QDAY PRN Kristin Martins M.D.        And   • bisacodyl (DULCOLAX) suppository 10 mg  10 mg QDAY PRN Kristin Martins M.D.       • HYDROcodone-acetaminophen (NORCO) 5-325 MG per tablet 1 Tab  1 Tab Q6HRS PRN Kristin MEJIA  JENNY Martins   1 Tab at 07/01/18 1800   • promethazine (PHENERGAN) tablet 12.5-25 mg  12.5-25 mg Q4HRS PRN Kristin Martins M.D.       • fentaNYL (SUBLIMAZE) injection 50 mcg  50 mcg Q HOUR PRN Kaushal Lawler D.O.   50 mcg at 07/01/18 2223   • enalaprilat (VASOTEC) injection 1.25 mg  1.25 mg Q6HRS PRN Jose Alfredo Domingo M.D.       • ondansetron (ZOFRAN) syringe/vial injection 4 mg  4 mg Q4HRS PRN Jose Alfredo Domingo M.D.       • promethazine (PHENERGAN) suppository 12.5-25 mg  12.5-25 mg Q4HRS PRN Jose Alfredo Domingo M.D.       • prochlorperazine (COMPAZINE) injection 5-10 mg  5-10 mg Q4HRS PRN Jose Alfredo Domingo M.D.       • nicotine (NICODERM) 14 MG/24HR 14 mg  14 mg Daily-0600 Jose Alfredo Domingo M.D.   14 mg at 07/02/18 0504   • bacitracin-polymyxin b (POLYSPORIN) 500-61543 UNIT/GM ointment   BID Jhonatan Norwood M.D.   1 Each at 07/01/18 2035     Last reviewed on 6/25/2018  9:29 PM by Yazmin Zhang [facility]    Quality  Measures:  Medications reviewed, Labs reviewed and Radiology images reviewed                    Problems/Plan:    Acute Alcohol Withdrawal Syndrome   - dc phenobarb protocol   - transition to precedex   - continue q1 hour neuro checks   - aspiration/sz precautions   - prn haldol     Acute Alcohol Withdrawal Seizure   - cont seizure precautions    Acute Left Orbital Fracture   - plastics discussed in ER and no surgical intervention   - no eye involvement    Acute Left Medial/Lateral Maxillary Wall Fractures   - no surgical intervention at this time   - cont augmentin 875mg BID for total 10 days    Thrombocytopenia   - resolving    Hypokalemia   - I am replacing to keep > 4.0    Hypomagnesemia   - I am replacing to keep > 2.0    Alcohol Abuse   - needs counseling when appropriate     Prophylaxis   - SCDs, enteral feedings, lovenox    Pt remains critically ill with unstable neuro process still requiring large doses of phenobarbital, transitioning to precedex infusion and continuous airway  evaluation/monitoring for need for intubation.  He is at high risk of clinical deterioration, worsening vital organ dysfunction, and death without the above critical care interventions.    Discussed patient condition and risk of morbidity and/or mortality with RN, RT, Therapies, Pharmacy, Dietary, , Charge nurse / hot rounds and hospitalist.  The patient remains critically ill.  Critical care time = 34 minutes in directly providing and coordinating critical care and extensive data review.  No time overlap and excludes procedures.

## 2018-07-02 NOTE — PROGRESS NOTES
[facility] Hospitalist Progress Note    Date of Service: 2018    Chief Complaint  37 y.o. male admitted 2018 with alcohol withdrawal seizure, facial trauma.    Interval Problem Update      agitated requiring multiple doses of phenobarbital overnight  SR  BP stable  pulled cortrak replaced TF at goal                    Consultants/Specialty  Plastic Surgery - [facility]  Ophthalmology  CC  Disposition  TBD        Review of Systems   Unable to perform ROS: Mental status change   Neurological: Tingling:   agitatedSRBP stable.      Physical Exam  Laboratory/Imaging   Hemodynamics  Temp (24hrs), Av °C (96.8 °F), Min:36 °C (96.8 °F), Max:36 °C (96.8 °F)   Temperature: 36 °C (96.8 °F), Monitored Temp: 37.4 °C (99.3 °F)  Pulse  Av.7  Min: 42  Max: 177 Heart Rate (Monitored): 78  NIBP: 103/71      Respiratory      Respiration: 12, Pulse Oximetry: 100 %     Work Of Breathing / Effort: Mild  RUL Breath Sounds: Rhonchi, RML Breath Sounds: Diminished, RLL Breath Sounds: Diminished, LOKESH Breath Sounds: Rhonchi, LLL Breath Sounds: Diminished    Fluids    Intake/Output Summary (Last 24 hours) at 18 0720  Last data filed at 18 0600   Gross per 24 hour   Intake             1380 ml   Output              965 ml   Net              415 ml       Nutrition  Orders Placed This Encounter   Procedures   • DIET NPO     Standing Status:   Standing     Number of Occurrences:   1     Order Specific Question:   Restrict to:     Answer:   Strict [1]     Physical Exam   Constitutional: He appears well-developed and well-nourished.   HENT:   Head: Normocephalic.   Mouth/Throat: Oropharynx is clear and moist. No oropharyngeal exudate.   Core track in place  Facial edema and ecchymosis improving     Eyes: Conjunctivae are normal. Right eye exhibits no discharge. Left eye exhibits no discharge. No scleral icterus.   Left periorbital ecchymosis and edema improved  Left pupil 4 mm reactive  Right pupil 3 mm reactive     Neck: Neck  supple. No JVD present. No tracheal deviation present.   Cardiovascular: Normal rate, regular rhythm and normal heart sounds.    Pulmonary/Chest: Effort normal. He has no decreased breath sounds. He has no wheezes. He has rhonchi. He has no rales. He exhibits no tenderness and no bony tenderness.   Abdominal: Soft. Bowel sounds are normal. He exhibits no distension. There is no tenderness. There is no rebound.   Musculoskeletal: He exhibits edema.   Neurological: No cranial nerve deficit. He exhibits normal muscle tone.   Lethargic  Not following commands on my exam  Moves all extremities spontaneously     Skin: Skin is warm and dry. He is not diaphoretic. No erythema.   Psychiatric: Cognition and memory are impaired. He expresses impulsivity and inappropriate judgment. He is noncommunicative. He exhibits abnormal recent memory and abnormal remote memory.   Nursing note and vitals reviewed.      Recent Labs      06/30/18   0650  07/01/18   0700  07/02/18   0443   WBC  5.2  4.9  4.4*   RBC  3.39*  3.89*  3.82*   HEMOGLOBIN  11.5*  12.9*  12.9*   HEMATOCRIT  33.6*  37.9*  37.7*   MCV  99.1*  97.4  98.7*   MCH  33.9*  33.2*  33.8*   MCHC  34.2  34.0  34.2   RDW  50.3*  48.1  48.9   PLATELETCT  85*  118*  149*   MPV  11.1  10.9  10.7     Recent Labs      07/01/18   0500  07/01/18   0700  07/02/18   0443   SODIUM  139  139  139   POTASSIUM  6.4*  3.9  3.6   CHLORIDE  106  104  106   CO2  24  27  23   GLUCOSE  119*  112*  102*   BUN  5*  6*  8   CREATININE  0.51  0.50  0.44*   CALCIUM  8.3*  9.3  9.0                      Assessment/Plan     * Delirium tremens (HCC)- (present on admission)   Assessment & Plan    Given persistent agitation and repeated doses of phenobarbital discussed with Dr. Hernandez  Patient being switched to Seroquel and Precedex          Facial trauma- (present on admission)   Assessment & Plan    With orbital fracture and anisocoria  Dr. Terrazas recommended outpatient follow-up for treatment of his  fracture after swelling has improved  Evaluated by ophthalmology who recommended clinical monitoring and outpatient follow-up    Continue Augmentin to complete 10 day course given sinus hemorrhage and increased risk of sinusitis          Alcohol abuse- (present on admission)   Assessment & Plan     on cessation when more alert        Thrombocytopenia (HCC)- (present on admission)   Assessment & Plan    Likely secondary to alcoholism    Platelet counts improved no signs of active bleeding at this time        Electrolyte abnormality   Assessment & Plan      Hypokalemia    Replete and monitor        Alcohol withdrawal seizure with delirium (HCC)   Assessment & Plan              Tobacco use- (present on admission)   Assessment & Plan    Nicotine replacement          Quality-Core Measures   Reviewed items::  Medications reviewed, Labs reviewed and Radiology images reviewed  Joshua catheter::  Critically Ill - Requiring Accurate Measurement of Urinary Output  DVT prophylaxis pharmacological::  Enoxaparin (Lovenox)  DVT prophylaxis - mechanical:  SCDs  Ulcer Prophylaxis::  No  Antibiotics:  Treating active infection/contamination beyond 24 hours perioperative coverage      Plan of care discussed with nursing staff pharmacist and critical care

## 2018-07-02 NOTE — DISCHARGE PLANNING
Medical SW    Sw attended AM IDT Rounds.    RN reports, pt pulls at restraints and tries to get out of bed when not sedated, cotrak replaced after pulled out yesterday,     Plan: Sw to assist w/ d/c planning as needed.

## 2018-07-03 LAB
ALBUMIN SERPL BCP-MCNC: 3.9 G/DL (ref 3.2–4.9)
ALBUMIN/GLOB SERPL: 1.3 G/DL
ALP SERPL-CCNC: 66 U/L (ref 30–99)
ALT SERPL-CCNC: 16 U/L (ref 2–50)
AMMONIA PLAS-SCNC: 44 UMOL/L (ref 11–45)
ANION GAP SERPL CALC-SCNC: 10 MMOL/L (ref 0–11.9)
AST SERPL-CCNC: 19 U/L (ref 12–45)
BASOPHILS # BLD AUTO: 0.8 % (ref 0–1.8)
BASOPHILS # BLD: 0.04 K/UL (ref 0–0.12)
BILIRUB SERPL-MCNC: 0.5 MG/DL (ref 0.1–1.5)
BUN SERPL-MCNC: 10 MG/DL (ref 8–22)
CALCIUM SERPL-MCNC: 9.6 MG/DL (ref 8.5–10.5)
CHLORIDE SERPL-SCNC: 102 MMOL/L (ref 96–112)
CO2 SERPL-SCNC: 27 MMOL/L (ref 20–33)
CREAT SERPL-MCNC: 0.57 MG/DL (ref 0.5–1.4)
EOSINOPHIL # BLD AUTO: 0.18 K/UL (ref 0–0.51)
EOSINOPHIL NFR BLD: 3.8 % (ref 0–6.9)
ERYTHROCYTE [DISTWIDTH] IN BLOOD BY AUTOMATED COUNT: 47.2 FL (ref 35.9–50)
GLOBULIN SER CALC-MCNC: 2.9 G/DL (ref 1.9–3.5)
GLUCOSE SERPL-MCNC: 118 MG/DL (ref 65–99)
HCT VFR BLD AUTO: 38.5 % (ref 42–52)
HGB BLD-MCNC: 13.1 G/DL (ref 14–18)
IMM GRANULOCYTES # BLD AUTO: 0.02 K/UL (ref 0–0.11)
IMM GRANULOCYTES NFR BLD AUTO: 0.4 % (ref 0–0.9)
LYMPHOCYTES # BLD AUTO: 1 K/UL (ref 1–4.8)
LYMPHOCYTES NFR BLD: 20.8 % (ref 22–41)
MAGNESIUM SERPL-MCNC: 2 MG/DL (ref 1.5–2.5)
MCH RBC QN AUTO: 33.5 PG (ref 27–33)
MCHC RBC AUTO-ENTMCNC: 34 G/DL (ref 33.7–35.3)
MCV RBC AUTO: 98.5 FL (ref 81.4–97.8)
MONOCYTES # BLD AUTO: 0.7 K/UL (ref 0–0.85)
MONOCYTES NFR BLD AUTO: 14.6 % (ref 0–13.4)
NEUTROPHILS # BLD AUTO: 2.86 K/UL (ref 1.82–7.42)
NEUTROPHILS NFR BLD: 59.6 % (ref 44–72)
NRBC # BLD AUTO: 0 K/UL
NRBC BLD-RTO: 0 /100 WBC
PLATELET # BLD AUTO: 193 K/UL (ref 164–446)
PMV BLD AUTO: 10.8 FL (ref 9–12.9)
POTASSIUM SERPL-SCNC: 4.5 MMOL/L (ref 3.6–5.5)
PROT SERPL-MCNC: 6.8 G/DL (ref 6–8.2)
RBC # BLD AUTO: 3.91 M/UL (ref 4.7–6.1)
SODIUM SERPL-SCNC: 139 MMOL/L (ref 135–145)
WBC # BLD AUTO: 4.8 K/UL (ref 4.8–10.8)

## 2018-07-03 PROCEDURE — 700102 HCHG RX REV CODE 250 W/ 637 OVERRIDE(OP): Performed by: INTERNAL MEDICINE

## 2018-07-03 PROCEDURE — 700105 HCHG RX REV CODE 258: Performed by: INTERNAL MEDICINE

## 2018-07-03 PROCEDURE — A9270 NON-COVERED ITEM OR SERVICE: HCPCS | Performed by: HOSPITALIST

## 2018-07-03 PROCEDURE — 700101 HCHG RX REV CODE 250: Performed by: INTERNAL MEDICINE

## 2018-07-03 PROCEDURE — 83735 ASSAY OF MAGNESIUM: CPT

## 2018-07-03 PROCEDURE — 700111 HCHG RX REV CODE 636 W/ 250 OVERRIDE (IP): Performed by: HOSPITALIST

## 2018-07-03 PROCEDURE — 80053 COMPREHEN METABOLIC PANEL: CPT

## 2018-07-03 PROCEDURE — A9270 NON-COVERED ITEM OR SERVICE: HCPCS | Performed by: INTERNAL MEDICINE

## 2018-07-03 PROCEDURE — A9270 NON-COVERED ITEM OR SERVICE: HCPCS | Performed by: FAMILY MEDICINE

## 2018-07-03 PROCEDURE — 82140 ASSAY OF AMMONIA: CPT

## 2018-07-03 PROCEDURE — 700102 HCHG RX REV CODE 250 W/ 637 OVERRIDE(OP): Performed by: FAMILY MEDICINE

## 2018-07-03 PROCEDURE — 700102 HCHG RX REV CODE 250 W/ 637 OVERRIDE(OP): Performed by: HOSPITALIST

## 2018-07-03 PROCEDURE — 99291 CRITICAL CARE FIRST HOUR: CPT | Performed by: INTERNAL MEDICINE

## 2018-07-03 PROCEDURE — 700101 HCHG RX REV CODE 250: Performed by: FAMILY MEDICINE

## 2018-07-03 PROCEDURE — 99233 SBSQ HOSP IP/OBS HIGH 50: CPT | Performed by: HOSPITALIST

## 2018-07-03 PROCEDURE — 85025 COMPLETE CBC W/AUTO DIFF WBC: CPT

## 2018-07-03 PROCEDURE — 770022 HCHG ROOM/CARE - ICU (200)

## 2018-07-03 RX ORDER — POLYETHYLENE GLYCOL 3350 17 G/17G
1 POWDER, FOR SOLUTION ORAL 2 TIMES DAILY
Status: DISCONTINUED | OUTPATIENT
Start: 2018-07-03 | End: 2018-07-03

## 2018-07-03 RX ORDER — POLYETHYLENE GLYCOL 3350 17 G/17G
1 POWDER, FOR SOLUTION ORAL 2 TIMES DAILY
Status: DISCONTINUED | OUTPATIENT
Start: 2018-07-03 | End: 2018-07-06 | Stop reason: HOSPADM

## 2018-07-03 RX ADMIN — DEXMEDETOMIDINE HYDROCHLORIDE 0.8 MCG/KG/HR: 100 INJECTION, SOLUTION INTRAVENOUS at 03:13

## 2018-07-03 RX ADMIN — Medication 1 EACH: at 08:07

## 2018-07-03 RX ADMIN — STANDARDIZED SENNA CONCENTRATE AND DOCUSATE SODIUM 2 TABLET: 8.6; 5 TABLET, FILM COATED ORAL at 08:06

## 2018-07-03 RX ADMIN — QUETIAPINE FUMARATE 50 MG: 25 TABLET ORAL at 10:35

## 2018-07-03 RX ADMIN — POLYETHYLENE GLYCOL 3350 1 PACKET: 17 POWDER, FOR SOLUTION ORAL at 20:11

## 2018-07-03 RX ADMIN — ENOXAPARIN SODIUM 40 MG: 100 INJECTION SUBCUTANEOUS at 08:07

## 2018-07-03 RX ADMIN — POLYETHYLENE GLYCOL 3350 1 PACKET: 17 POWDER, FOR SOLUTION ORAL at 08:06

## 2018-07-03 RX ADMIN — QUETIAPINE FUMARATE 50 MG: 25 TABLET ORAL at 18:30

## 2018-07-03 RX ADMIN — AMOXICILLIN AND CLAVULANATE POTASSIUM 1 TABLET: 875; 125 TABLET, FILM COATED ORAL at 09:00

## 2018-07-03 RX ADMIN — NICOTINE 14 MG: 14 PATCH, EXTENDED RELEASE TRANSDERMAL at 06:00

## 2018-07-03 RX ADMIN — QUETIAPINE FUMARATE 50 MG: 25 TABLET ORAL at 02:52

## 2018-07-03 RX ADMIN — STANDARDIZED SENNA CONCENTRATE AND DOCUSATE SODIUM 2 TABLET: 8.6; 5 TABLET, FILM COATED ORAL at 20:12

## 2018-07-03 RX ADMIN — Medication 1 EACH: at 20:11

## 2018-07-03 RX ADMIN — DEXMEDETOMIDINE HYDROCHLORIDE 0.8 MCG/KG/HR: 100 INJECTION, SOLUTION INTRAVENOUS at 14:03

## 2018-07-03 RX ADMIN — AMOXICILLIN AND CLAVULANATE POTASSIUM 1 TABLET: 875; 125 TABLET, FILM COATED ORAL at 21:00

## 2018-07-03 ASSESSMENT — LIFESTYLE VARIABLES
VISUAL DISTURBANCES: NOT PRESENT
AGITATION: NORMAL ACTIVITY
TOTAL SCORE: 4
TREMOR: NO TREMOR
AUDITORY DISTURBANCES: NOT PRESENT
AUDITORY DISTURBANCES: NOT PRESENT
HEADACHE, FULLNESS IN HEAD: NOT PRESENT
AGITATION: *
PAROXYSMAL SWEATS: NO SWEAT VISIBLE
ANXIETY: *
ORIENTATION AND CLOUDING OF SENSORIUM: DISORIENTED FOR PLACE AND / OR PERSON
NAUSEA AND VOMITING: NO NAUSEA AND NO VOMITING
NAUSEA AND VOMITING: NO NAUSEA AND NO VOMITING
HEADACHE, FULLNESS IN HEAD: NOT PRESENT
ORIENTATION AND CLOUDING OF SENSORIUM: DISORIENTED FOR PLACE AND / OR PERSON
ANXIETY: NO ANXIETY (AT EASE)
VISUAL DISTURBANCES: NOT PRESENT
TOTAL SCORE: 8
TREMOR: NO TREMOR
PAROXYSMAL SWEATS: NO SWEAT VISIBLE

## 2018-07-03 ASSESSMENT — PAIN SCALES - GENERAL
PAINLEVEL_OUTOF10: 0
PAINLEVEL_OUTOF10: 3
PAINLEVEL_OUTOF10: 0
PAINLEVEL_OUTOF10: 0

## 2018-07-03 NOTE — PROGRESS NOTES
Monitor Summary: 18/10/40. NSR 60-90.    Precdex max of 0.8, weaned off after Seroquel  Admin due to Hypotension and RASS of -4.    No other issues this shift. Will continue to monitor and tx pt per orders.

## 2018-07-03 NOTE — CARE PLAN
Pt. overall stable over shift    Acute Events: intermittent agitation, yelling/swearing at staff, spitting at staff.    Mobility: Assists with turns in bed, unable to mobilize oob due to confusion and agitation when awake.    Neuro exam: + commands, waxing and waning neuro exam. Pupils unequal, MD notified, Moves all 4 extremities strong with equal strength. Precedex gtt for agititation.    Bruising over L. Eye noted.    Pt. Has borderline low BP's, Precedex decreased at times due to hypotension. NSR     GI: TF @ goal, no BM this shift, bowel meds given    : Joshua in place, adequate UOP    Summary: Pt. Overall stable, continues to have confusion/agitation. Precedex for agitation. No SOB, RA, will continue to monitor.  Problem: Safety  Goal: Will remain free from falls  Outcome: PROGRESSING AS EXPECTED  Bed in low/locked position, side rails up x3, fall band on, bed alarm on, near nurses station. Will continue to monitor.    Problem: Bowel/Gastric:  Goal: Normal bowel function is maintained or improved  Outcome: PROGRESSING AS EXPECTED  Last bm 7/1. Miralax/senna given, will continue to monitor for effect.     Problem: Psychosocial Needs:  Goal: Level of anxiety will decrease  Outcome: PROGRESSING SLOWER THAN EXPECTED  Pt. Intermittently agitated. Swearing at staff/yelling. Spitting towards staff. Relaxation techniques encouraged, will continue to monitor.

## 2018-07-03 NOTE — PROGRESS NOTES
Pt. Verbally abusive/swearing at staff. Spits at staff. Reorientation and education regarding plan of care provided. Precedex titrated for RASS 0. Seroquel given as ordered. Will continue to monitor.

## 2018-07-03 NOTE — PROGRESS NOTES
Pulmonary Critical Care Progress Note        Admit: 6/26/2018    Chief Complaint: severe agitation and alcohol withdrawal seizures    History of Present Illness: 37 y.o. male with a history of alcohol abuse was admitted for alcohol withdrawal seizures and severe alcohol withdrawal syndrome     Review of Systems   Unable to perform ROS: Acuity of condition       Interval Events:  24 hour interval history reviewed   Tm 100  +214cc over last 24hr, +2.8L since admit  No cxr this am  K 4.5  Cr 0.57    BM 6/29 - added scheduled miralax bid  Tolerating tube feeds  Heavily sedate    precedex 0.9  Off pheno bryant 7/2  Seroquel 50 q8  Augmentin     PFSH:  No change.    Physical Exam   Constitutional: He appears well-developed and well-nourished.   HENT:   Head: Normocephalic and atraumatic.   Eyes: No scleral icterus.   Right pupil reactive to light, left weakly reactive and about 5mm     Neck: No tracheal deviation present.   Significant bruising to left neck region  improving   Cardiovascular: Normal rate and regular rhythm.    Pulmonary/Chest: He has no wheezes. He has no rales.   Scattered rhonchi   Abdominal: Soft. He exhibits no distension. There is no tenderness.   Musculoskeletal: He exhibits no edema.   Neurological: No cranial nerve deficit.   Skin: Skin is warm and dry.   Psychiatric:   Unable to assess   Nursing note and vitals reviewed.  unchanged    Respiratory:     Pulse Oximetry: 96 %    HemoDynamics:  Pulse: 67, Heart Rate (Monitored): 67  NIBP: (!) 91/62         Neuro:    Fluids:  Intake/Output       07/01/18 0700 - 07/02/18 0659 07/02/18 0700 - 07/03/18 0659 07/03/18 0700 - 07/04/18 0659      1136-8962 9801-8076 Total 1867-8489 8394-5871 Total 8525-2950 0111-9306 Total       Intake    I.V.  --  -- --  15.1  69.2 84.3  --  -- --    Precedex Volume -- -- -- 15.1 69.2 84.3 -- -- --    Other  60  -- 60  60  30 90  --  -- --    Medications (P.O./ Enteral Liquids) 60 -- 60 60 30 90 -- -- --    Enteral  480  840  1320  900  730 1630  --  -- --    Free Water / Tube Flush 60 -- 60 60 30 90 -- -- --    Intake (mL) ([REMOVED] Enteral Tube Right Nare Cortrak Small Bowel Feeding Tube) 420 -- 420 -- -- -- -- -- --    Intake (mL) (Enteral Tube Left Nare Cortrak Gastric Feeding Tube) -- 840 840  -- -- --    Total Intake  975.1 829.2 1804.3 -- -- --       Output    Urine  430  535 965  590  1000 1590  --  -- --    Output (mL) (Urinary Catheter Indwelling Catheter 16) 430 535  1590 -- -- --    Drains  0  -- 0  0  -- 0  --  -- --    Residual Amount (ml) (Discarded) 0 -- 0 0 -- 0 -- -- --    Total Output 430 535  1590 -- -- --       Net I/O     110 305 415 385.1 -170.8 214.3 -- -- --        Weight: 89 kg (196 lb 3.4 oz)  Recent Labs      18   0500  18   0718   0443   SODIUM  139  139  139   POTASSIUM  6.4*  3.9  3.6   CHLORIDE  106  104  106   CO2  24  27  23   BUN  5*  6*  8   CREATININE  0.51  0.50  0.44*   MAGNESIUM  2.1   --    --    CALCIUM  8.3*  9.3  9.0       GI/Nutrition:    Liver Function  Recent Labs      18   0500  18   0718   0443   ALTSGPT  20  24  16   ASTSGOT  48*  22  20   ALKPHOSPHAT  53  69  50   TBILIRUBIN  0.6  0.5  0.4   PREALBUMIN   --    --   15.0*   GLUCOSE  119*  112*  102*       Heme:  Recent Labs      18   0718   0443  18   0410   RBC  3.89*  3.82*  3.91*   HEMOGLOBIN  12.9*  12.9*  13.1*   HEMATOCRIT  37.9*  37.7*  38.5*   PLATELETCT  118*  149*  193       Infectious Disease:  Monitored Temp 2  Av °C (98.6 °F)  Min: 36.1 °C (97 °F)  Max: 37.8 °C (100 °F)  Micro: reviewed  Recent Labs      18   0500  18   0700  18   0443  18   0410   WBC   --   4.9  4.4*  4.8   NEUTSPOLYS   --   62.80  55.10  59.60   LYMPHOCYTES   --   15.00*  19.50*  20.80*   MONOCYTES   --   17.90*  20.80*  14.60*   EOSINOPHILS   --   2.70  3.40  3.80   BASOPHILS   --   1.20  0.70  0.80   ASTSGOT  48*   22  20   --    ALTSGPT  20  24  16   --    ALKPHOSPHAT  53  69  50   --    TBILIRUBIN  0.6  0.5  0.4   --      Current Facility-Administered Medications   Medication Dose Frequency Provider Last Rate Last Dose   • dexmedetomidine (PRECEDEX) 400 mcg in D5W 100 mL infusion  0.1-1.5 mcg/kg/hr Continuous Andrea Hernandez M.D. 19.6 mL/hr at 07/03/18 0609 0.9 mcg/kg/hr at 07/03/18 0609   • QUEtiapine (SEROQUEL) tablet 50 mg  50 mg Q8HR Andrea Hernandez M.D.   50 mg at 07/03/18 0252   • enoxaparin (LOVENOX) inj 40 mg  40 mg DAILY Maco Brown M.D.   40 mg at 07/02/18 0753   • amoxicillin-clavulanate (AUGMENTIN) 875-125 MG per tablet 1 Tab  1 Tab Q12HRS Maco Brown M.D.   1 Tab at 07/02/18 2100   • ondansetron (ZOFRAN ODT) dispertab 4 mg  4 mg Q4HRS PRN Maco Brown M.D.       • MD ALERT...Adult ICU Electrolyte Replacement per Pharmacy Protocol   PRN Maco Brown M.D.       • Pharmacy Consult: Enteral tube feeding - review meds/change route/product selection  1 Each PRN Maco Brown M.D.       • senna-docusate (PERICOLACE or SENOKOT S) 8.6-50 MG per tablet 2 Tab  2 Tab BID Kristin Martins M.D.   Stopped at 07/01/18 0900    And   • polyethylene glycol/lytes (MIRALAX) PACKET 1 Packet  1 Packet QDAY PRN Kristin Martins M.D.        And   • magnesium hydroxide (MILK OF MAGNESIA) suspension 30 mL  30 mL QDAY PRN Kristin Martins M.D.        And   • bisacodyl (DULCOLAX) suppository 10 mg  10 mg QDAY PRN Kristin Martins M.D.       • HYDROcodone-acetaminophen (NORCO) 5-325 MG per tablet 1 Tab  1 Tab Q6HRS PRN Kristin Martins M.D.   1 Tab at 07/02/18 0800   • promethazine (PHENERGAN) tablet 12.5-25 mg  12.5-25 mg Q4HRS PRN Kristin Martins M.D.       • fentaNYL (SUBLIMAZE) injection 50 mcg  50 mcg Q HOUR PRN Kaushal Lawler D.O.   50 mcg at 07/01/18 2223   • enalaprilat (VASOTEC) injection 1.25 mg  1.25 mg Q6HRS PRN Jose Alfredo Domingo M.D.       • ondansetron (ZOFRAN) syringe/vial  injection 4 mg  4 mg Q4HRS PRN Jose Alfredo Domingo M.D.       • promethazine (PHENERGAN) suppository 12.5-25 mg  12.5-25 mg Q4HRS PRN Jose Alfredo Domingo M.D.       • prochlorperazine (COMPAZINE) injection 5-10 mg  5-10 mg Q4HRS PRN Jose Alfredo Domingo M.D.       • nicotine (NICODERM) 14 MG/24HR 14 mg  14 mg Daily-0600 Jose Alfredo Domingo M.D.   14 mg at 07/03/18 0600   • bacitracin-polymyxin b (POLYSPORIN) 500-06682 UNIT/GM ointment   BID Jhonatan Norwood M.D.   1 Each at 07/02/18 2011     Last reviewed on 6/25/2018  9:29 PM by Yazmin Zhang [facility]    Quality  Measures:  Medications reviewed, Labs reviewed and Radiology images reviewed                    Problems/Plan:    Acute Alcohol Withdrawal Syndrome   - s/p phenobarb protocol 6/27-7/2   - continue to titrate precedex   - continue q1 hour neuro checks   - aspiration/sz precautions   - prn haldol     Acute Alcohol Withdrawal Seizure   - cont seizure precautions    Acute Left Orbital Fracture   - plastics discussed in ER and no surgical intervention   - no eye involvement    Acute Left Medial/Lateral Maxillary Wall Fractures   - no surgical intervention at this time   - cont augmentin 875mg BID for total 10 days    Thrombocytopenia   - resolved    Hypokalemia   - I am replacing to keep > 4.0    Hypomagnesemia   - I am replacing to keep > 2.0    Alcohol Abuse   - needs counseling when appropriate       Pt remains critically ill with unstable neuro process still requiring active titration of precedex infusion and continuous airway evaluation/monitoring for need for intubation.  He is at extreme risk of clinical deterioration, worsening vital organ dysfunction, and death without the above critical care interventions.    Discussed patient condition and risk of morbidity and/or mortality with RN, RT, Therapies, Pharmacy, Dietary, , Charge nurse / hot rounds and hospitalist.  The patient remains critically ill.  Critical care time = 31 minutes in directly  providing and coordinating critical care and extensive data review.  No time overlap and excludes procedures.

## 2018-07-03 NOTE — DISCHARGE PLANNING
Medical SW    Sw attended AM IDT Rounds.    RN reports, pt overnight some agitation, asleep this AM, tube feed at goal rate,       Plan: Sw to assist w/ d/c planning as needed.

## 2018-07-03 NOTE — PROGRESS NOTES
[facility] Hospitalist Progress Note    Date of Service: 7/3/2018    Chief Complaint  37 y.o. male admitted 2018 with seizure.    Interval Problem Update  Mr. Chen has a history of alcohol abuse that was [place] the court house due to a speeding ticket where he was found to have a seizure. He was brought to the ER where he was found to have a left orbital fracture. He has been admitted to the ICU due to severe alcohol withdrawal.   RN notes that he has been agitated. His blood pressure has been low  cortrak feeding has been at goal. He remains on a Precedex drip. Mr. Chen was quite agitated when the precedex drip dosing was off. He remains in restraints. RN notes no family members have visited today.  Consultants/Specialty  Critical care. I discussed his condition with Dr. Hernandez on ICU Hot Rounds.   Pamela, ophthalmology     Disposition  ICU        Review of Systems   Unable to perform ROS: Mental acuity      Physical Exam  Laboratory/Imaging   Hemodynamics  No data recorded.   Monitored Temp: 36.2 °C (97.2 °F)  Pulse  Av.3  Min: 42  Max: 177 Heart Rate (Monitored): 67  NIBP: (!) 91/62      Respiratory      Respiration: (!) 10, Pulse Oximetry: 96 %     Work Of Breathing / Effort: Mild  RUL Breath Sounds: Rhonchi;Diminished, RML Breath Sounds: Diminished, RLL Breath Sounds: Diminished, LOKESH Breath Sounds: Rhonchi;Diminished, LLL Breath Sounds: Diminished    Fluids    Intake/Output Summary (Last 24 hours) at 18 0821  Last data filed at 18 0600   Gross per 24 hour   Intake          1604.25 ml   Output             1530 ml   Net            74.25 ml       Nutrition  Orders Placed This Encounter   Procedures   • DIET NPO     Standing Status:   Standing     Number of Occurrences:   1     Order Specific Question:   Restrict to:     Answer:   Strict [1]     Physical Exam   HENT:   Left nares cortrak   Eyes:   Left eye ecchymosis   Right pupil 2 mm left 3 mm   Neck:   Supple to passive movement      Cardiovascular: Normal rate and regular rhythm.    No murmur heard.  Pulmonary/Chest: Effort normal. No respiratory distress. He has no wheezes.   Abdominal: Soft. He exhibits no distension. There is no tenderness.   Genitourinary:   Genitourinary Comments: Joshua catheter   Musculoskeletal: He exhibits no edema or tenderness.   Neurological:   Sedated, he awakens with some speech and agitation though is not lucid.   Skin: Skin is warm and dry.   Bruise right knee.   Nursing note and vitals reviewed.      Recent Labs      07/01/18   0700 07/02/18   0443  07/03/18   0410   WBC  4.9  4.4*  4.8   RBC  3.89*  3.82*  3.91*   HEMOGLOBIN  12.9*  12.9*  13.1*   HEMATOCRIT  37.9*  37.7*  38.5*   MCV  97.4  98.7*  98.5*   MCH  33.2*  33.8*  33.5*   MCHC  34.0  34.2  34.0   RDW  48.1  48.9  47.2   PLATELETCT  118*  149*  193   MPV  10.9  10.7  10.8     Recent Labs      07/01/18   0700 07/02/18 0443  07/03/18   0410   SODIUM  139  139  139   POTASSIUM  3.9  3.6  4.5   CHLORIDE  104  106  102   CO2  27  23  27   GLUCOSE  112*  102*  118*   BUN  6*  8  10   CREATININE  0.50  0.44*  0.57   CALCIUM  9.3  9.0  9.6                      Assessment/Plan     * Delirium tremens (HCC)- (present on admission)   Assessment & Plan    Requiring ICU admission.  Status post phenobarbital protocol now transitioned to Seroquel and Precedex          Facial trauma- (present on admission)   Assessment & Plan    With orbital fracture and anisocoria  Dr. Terrazas recommended outpatient follow-up for treatment of his fracture after swelling has improved  Evaluated by ophthalmology who recommended clinical monitoring and outpatient follow-up    Continue Augmentin to complete 10 day course given sinus hemorrhage and increased risk of sinusitis          Alcohol abuse- (present on admission)   Assessment & Plan     on cessation when he is lucid        Pancytopenia (HCC)- (present on admission)   Assessment & Plan    Likely secondary to  alcohol-inducted bone marrow suppression.         Electrolyte abnormality   Assessment & Plan      Hypokalemia  On replacement and follow daily BMPs        Alcohol withdrawal seizure with delirium (HCC)   Assessment & Plan    Seizure precautions          Tobacco use- (present on admission)   Assessment & Plan    Nicotine patch          Quality-Core Measures   Reviewed items::  Labs reviewed and Medications reviewed  Joshua catheter::  Critically Ill - Requiring Accurate Measurement of Urinary Output  DVT prophylaxis pharmacological::  Enoxaparin (Lovenox)

## 2018-07-04 ENCOUNTER — APPOINTMENT (OUTPATIENT)
Dept: RADIOLOGY | Facility: MEDICAL CENTER | Age: 38
DRG: 988 | End: 2018-07-04
Attending: INTERNAL MEDICINE
Payer: COMMERCIAL

## 2018-07-04 LAB
ALBUMIN SERPL BCP-MCNC: 3.9 G/DL (ref 3.2–4.9)
ALBUMIN/GLOB SERPL: 1.1 G/DL
ALP SERPL-CCNC: 77 U/L (ref 30–99)
ALT SERPL-CCNC: 26 U/L (ref 2–50)
ANION GAP SERPL CALC-SCNC: 9 MMOL/L (ref 0–11.9)
AST SERPL-CCNC: 38 U/L (ref 12–45)
BASOPHILS # BLD AUTO: 1.2 % (ref 0–1.8)
BASOPHILS # BLD: 0.08 K/UL (ref 0–0.12)
BILIRUB SERPL-MCNC: 0.5 MG/DL (ref 0.1–1.5)
BUN SERPL-MCNC: 15 MG/DL (ref 8–22)
CALCIUM SERPL-MCNC: 9.9 MG/DL (ref 8.5–10.5)
CHLORIDE SERPL-SCNC: 101 MMOL/L (ref 96–112)
CO2 SERPL-SCNC: 27 MMOL/L (ref 20–33)
CREAT SERPL-MCNC: 0.65 MG/DL (ref 0.5–1.4)
EOSINOPHIL # BLD AUTO: 0.21 K/UL (ref 0–0.51)
EOSINOPHIL NFR BLD: 3.3 % (ref 0–6.9)
ERYTHROCYTE [DISTWIDTH] IN BLOOD BY AUTOMATED COUNT: 47.5 FL (ref 35.9–50)
GLOBULIN SER CALC-MCNC: 3.5 G/DL (ref 1.9–3.5)
GLUCOSE SERPL-MCNC: 93 MG/DL (ref 65–99)
HCT VFR BLD AUTO: 43.1 % (ref 42–52)
HGB BLD-MCNC: 14.7 G/DL (ref 14–18)
IMM GRANULOCYTES # BLD AUTO: 0.04 K/UL (ref 0–0.11)
IMM GRANULOCYTES NFR BLD AUTO: 0.6 % (ref 0–0.9)
LYMPHOCYTES # BLD AUTO: 0.87 K/UL (ref 1–4.8)
LYMPHOCYTES NFR BLD: 13.6 % (ref 22–41)
MAGNESIUM SERPL-MCNC: 2.2 MG/DL (ref 1.5–2.5)
MCH RBC QN AUTO: 33.3 PG (ref 27–33)
MCHC RBC AUTO-ENTMCNC: 34.1 G/DL (ref 33.7–35.3)
MCV RBC AUTO: 97.7 FL (ref 81.4–97.8)
MONOCYTES # BLD AUTO: 0.71 K/UL (ref 0–0.85)
MONOCYTES NFR BLD AUTO: 11.1 % (ref 0–13.4)
NEUTROPHILS # BLD AUTO: 4.5 K/UL (ref 1.82–7.42)
NEUTROPHILS NFR BLD: 70.2 % (ref 44–72)
NRBC # BLD AUTO: 0 K/UL
NRBC BLD-RTO: 0 /100 WBC
PLATELET # BLD AUTO: 220 K/UL (ref 164–446)
PMV BLD AUTO: 10.5 FL (ref 9–12.9)
POTASSIUM SERPL-SCNC: 5.3 MMOL/L (ref 3.6–5.5)
PROT SERPL-MCNC: 7.4 G/DL (ref 6–8.2)
RBC # BLD AUTO: 4.41 M/UL (ref 4.7–6.1)
SODIUM SERPL-SCNC: 137 MMOL/L (ref 135–145)
WBC # BLD AUTO: 6.4 K/UL (ref 4.8–10.8)

## 2018-07-04 PROCEDURE — A9270 NON-COVERED ITEM OR SERVICE: HCPCS | Performed by: FAMILY MEDICINE

## 2018-07-04 PROCEDURE — 700101 HCHG RX REV CODE 250: Performed by: FAMILY MEDICINE

## 2018-07-04 PROCEDURE — 700102 HCHG RX REV CODE 250 W/ 637 OVERRIDE(OP): Performed by: INTERNAL MEDICINE

## 2018-07-04 PROCEDURE — 700102 HCHG RX REV CODE 250 W/ 637 OVERRIDE(OP): Performed by: FAMILY MEDICINE

## 2018-07-04 PROCEDURE — 85025 COMPLETE CBC W/AUTO DIFF WBC: CPT

## 2018-07-04 PROCEDURE — 83735 ASSAY OF MAGNESIUM: CPT

## 2018-07-04 PROCEDURE — 80053 COMPREHEN METABOLIC PANEL: CPT

## 2018-07-04 PROCEDURE — 700111 HCHG RX REV CODE 636 W/ 250 OVERRIDE (IP): Performed by: HOSPITALIST

## 2018-07-04 PROCEDURE — 770022 HCHG ROOM/CARE - ICU (200)

## 2018-07-04 PROCEDURE — A9270 NON-COVERED ITEM OR SERVICE: HCPCS | Performed by: HOSPITALIST

## 2018-07-04 PROCEDURE — A9270 NON-COVERED ITEM OR SERVICE: HCPCS | Performed by: INTERNAL MEDICINE

## 2018-07-04 PROCEDURE — 700105 HCHG RX REV CODE 258: Performed by: INTERNAL MEDICINE

## 2018-07-04 PROCEDURE — 700102 HCHG RX REV CODE 250 W/ 637 OVERRIDE(OP): Performed by: HOSPITALIST

## 2018-07-04 PROCEDURE — 99233 SBSQ HOSP IP/OBS HIGH 50: CPT | Performed by: HOSPITALIST

## 2018-07-04 PROCEDURE — 99291 CRITICAL CARE FIRST HOUR: CPT | Performed by: INTERNAL MEDICINE

## 2018-07-04 PROCEDURE — 700101 HCHG RX REV CODE 250: Performed by: INTERNAL MEDICINE

## 2018-07-04 RX ORDER — QUETIAPINE FUMARATE 100 MG/1
100 TABLET, FILM COATED ORAL EVERY 8 HOURS
Status: DISCONTINUED | OUTPATIENT
Start: 2018-07-04 | End: 2018-07-06

## 2018-07-04 RX ADMIN — ENOXAPARIN SODIUM 40 MG: 100 INJECTION SUBCUTANEOUS at 08:18

## 2018-07-04 RX ADMIN — DEXMEDETOMIDINE HYDROCHLORIDE 0.6 MCG/KG/HR: 100 INJECTION, SOLUTION INTRAVENOUS at 10:51

## 2018-07-04 RX ADMIN — QUETIAPINE FUMARATE 50 MG: 25 TABLET ORAL at 02:44

## 2018-07-04 RX ADMIN — AMOXICILLIN AND CLAVULANATE POTASSIUM 1 TABLET: 875; 125 TABLET, FILM COATED ORAL at 21:00

## 2018-07-04 RX ADMIN — QUETIAPINE FUMARATE 100 MG: 100 TABLET ORAL at 10:44

## 2018-07-04 RX ADMIN — POLYETHYLENE GLYCOL 3350 1 PACKET: 17 POWDER, FOR SOLUTION ORAL at 08:18

## 2018-07-04 RX ADMIN — Medication 1 EACH: at 20:22

## 2018-07-04 RX ADMIN — POLYETHYLENE GLYCOL 3350 1 PACKET: 17 POWDER, FOR SOLUTION ORAL at 20:22

## 2018-07-04 RX ADMIN — QUETIAPINE FUMARATE 100 MG: 100 TABLET ORAL at 20:22

## 2018-07-04 RX ADMIN — NICOTINE 14 MG: 14 PATCH, EXTENDED RELEASE TRANSDERMAL at 06:01

## 2018-07-04 RX ADMIN — Medication 1 EACH: at 08:17

## 2018-07-04 RX ADMIN — AMOXICILLIN AND CLAVULANATE POTASSIUM 1 TABLET: 875; 125 TABLET, FILM COATED ORAL at 09:00

## 2018-07-04 RX ADMIN — STANDARDIZED SENNA CONCENTRATE AND DOCUSATE SODIUM 2 TABLET: 8.6; 5 TABLET, FILM COATED ORAL at 20:22

## 2018-07-04 RX ADMIN — STANDARDIZED SENNA CONCENTRATE AND DOCUSATE SODIUM 2 TABLET: 8.6; 5 TABLET, FILM COATED ORAL at 08:18

## 2018-07-04 ASSESSMENT — LIFESTYLE VARIABLES
ANXIETY: *
TOTAL SCORE: 8
AUDITORY DISTURBANCES: NOT PRESENT
AUDITORY DISTURBANCES: NOT PRESENT
TREMOR: NO TREMOR
HEADACHE, FULLNESS IN HEAD: NOT PRESENT
TOTAL SCORE: 9
VISUAL DISTURBANCES: NOT PRESENT
AGITATION: MODERATELY FIDGETY AND RESTLESS
NAUSEA AND VOMITING: NO NAUSEA AND NO VOMITING
HEADACHE, FULLNESS IN HEAD: NOT PRESENT
ORIENTATION AND CLOUDING OF SENSORIUM: DATE DISORIENTATION BY MORE THAN TWO CALENDAR DAYS
PAROXYSMAL SWEATS: NO SWEAT VISIBLE
ANXIETY: *
AGITATION: *
VISUAL DISTURBANCES: NOT PRESENT
TREMOR: NO TREMOR
NAUSEA AND VOMITING: NO NAUSEA AND NO VOMITING
ORIENTATION AND CLOUDING OF SENSORIUM: DISORIENTED FOR PLACE AND / OR PERSON
PAROXYSMAL SWEATS: NO SWEAT VISIBLE

## 2018-07-04 ASSESSMENT — PAIN SCALES - GENERAL
PAINLEVEL_OUTOF10: 0

## 2018-07-04 ASSESSMENT — ENCOUNTER SYMPTOMS
DOUBLE VISION: 0
SHORTNESS OF BREATH: 0
CHILLS: 0
ABDOMINAL PAIN: 0
FEVER: 0
PALPITATIONS: 0
BLURRED VISION: 0
COUGH: 1
NAUSEA: 0
VOMITING: 0
DEPRESSION: 0
FOCAL WEAKNESS: 0
SPUTUM PRODUCTION: 1

## 2018-07-04 ASSESSMENT — PATIENT HEALTH QUESTIONNAIRE - PHQ9
1. LITTLE INTEREST OR PLEASURE IN DOING THINGS: NOT AT ALL
SUM OF ALL RESPONSES TO PHQ9 QUESTIONS 1 AND 2: 0
2. FEELING DOWN, DEPRESSED, IRRITABLE, OR HOPELESS: NOT AT ALL

## 2018-07-04 NOTE — CARE PLAN
Pt. overall stable over shift    Acute Events: Pt. Has intermittent agitation. Pulls cortrack out, pulls at aguirre catheter.     Mobility: Pt. Too confused/agitated to mobilize. Pt. Spitting towards staff.    Neuro exam: Waxing and waning neuro exam. Pt. Follows commands, but confused to location, time, and situation intermittently. Pupils remain unequal as previous assessment, team aware.    Hemodynamically stable. Vasopressors: none    Resp: RA, No SOB. Strong cough    GI:Cortrack removed per pt/replaced. TF@ goal of 70mL/hr     : Aguirre in place, clear/yellow urine.    Skin: Facial bruising present. Bruising on knees noted.     Summary: Pt. Showing slow improvement of neurologic exam. Slowly becoming more oriented, but still impulsive/agitated. Continues to yell out and spit towards staff. Will continue to monitor.    Problem: Venous Thromboembolism (VTW)/Deep Vein Thrombosis (DVT) Prevention:  Goal: Patient will participate in Venous Thrombosis (VTE)/Deep Vein Thrombosis (DVT)Prevention Measures  Outcome: PROGRESSING AS EXPECTED      Problem: Psychosocial Needs:  Goal: Level of anxiety will decrease  Outcome: PROGRESSING SLOWER THAN EXPECTED

## 2018-07-04 NOTE — DISCHARGE PLANNING
edical ANA    Sw attended AM IDT Rounds.    RN reports, pt fall related to seizure toxicology and ETOH, confused, agitated during attempted mobilization this AM     Plan: Sw to assist w/ d/c planning as needed.

## 2018-07-04 NOTE — PROGRESS NOTES
[facility] Hospitalist Progress Note    Date of Service: 2018    Chief Complaint  37 y.o. male admitted 2018 with seizure.    Interval Problem Update  Mr. Chen has a history of alcohol abuse that was at the court house due to a speeding ticket where he was found to have a seizure. He was brought to the ER where he was found to have a left orbital fracture. He has been admitted to the ICU due to severe alcohol withdrawal.   RN notes that he has been agitated. His blood pressure has been low  cortrak feeding has been at goal. He remains on a Precedex drip 0.8. Mr. Chen is very agitated and yelling out with obscenities. He remains in restraints. RN notes no family members have visited [facility] today.  Consultants/Specialty  Critical care. I discussed his condition with Dr. Hernandez on ICU Hot Rounds.   Geraymovmargarita, ophthalmology   [place]e, plastic surgery  Disposition  ICU        Review of Systems   Unable to perform ROS: Mental acuity      Physical Exam  Laboratory/Imaging   Hemodynamics  Temp (24hrs), Av.8 °C (98.2 °F), Min:36.8 °C (98.2 °F), Max:36.8 °C (98.2 °F)   Temperature: 36.8 °C (98.2 °F), Monitored Temp: 35.9 °C (96.6 °F)  Pulse  Av.7  Min: 42  Max: 177 Heart Rate (Monitored): 79  NIBP: (!) 98/71      Respiratory      Respiration: (!) 11, Pulse Oximetry: 93 %     Work Of Breathing / Effort: Mild  RUL Breath Sounds: Clear, RML Breath Sounds: Clear, RLL Breath Sounds: Diminished, LOKESH Breath Sounds: Clear, LLL Breath Sounds: Diminished    Fluids    Intake/Output Summary (Last 24 hours) at 18 0744  Last data filed at 18 0600   Gross per 24 hour   Intake          2204.87 ml   Output             2110 ml   Net            94.87 ml       Nutrition  Orders Placed This Encounter   Procedures   • DIET NPO     Standing Status:   Standing     Number of Occurrences:   1     Order Specific Question:   Restrict to:     Answer:   Strict [1]     Physical Exam   Constitutional: He appears  well-nourished. He appears distressed.   HENT:   Left nares cortrak  Left eye injected with periorbital bruising down to the left neck.    Neck: Neck supple.        Cardiovascular: Normal rate and regular rhythm.    No murmur heard.  Pulmonary/Chest: Effort normal. No respiratory distress. He has no wheezes.   Abdominal: Soft. He exhibits no distension. There is no tenderness.   Genitourinary:   Genitourinary Comments: Joshua catheter   Musculoskeletal: He exhibits no edema or tenderness.   Soft wrist restraints.    Neurological:   Agitated, yelling out, angry and combative   Skin: Skin is warm and dry. He is not diaphoretic.   Bruise right knee.   Nursing note and vitals reviewed.      Recent Labs      07/02/18   0443  07/03/18   0410  07/04/18   0736   WBC  4.4*  4.8  6.4   RBC  3.82*  3.91*  4.41*   HEMOGLOBIN  12.9*  13.1*  14.7   HEMATOCRIT  37.7*  38.5*  43.1   MCV  98.7*  98.5*  97.7   MCH  33.8*  33.5*  33.3*   MCHC  34.2  34.0  34.1   RDW  48.9  47.2  47.5   PLATELETCT  149*  193  220   MPV  10.7  10.8  10.5     Recent Labs      07/02/18   0443  07/03/18   0410   SODIUM  139  139   POTASSIUM  3.6  4.5   CHLORIDE  106  102   CO2  23  27   GLUCOSE  102*  118*   BUN  8  10   CREATININE  0.44*  0.57   CALCIUM  9.0  9.6                      Assessment/Plan     * Delirium tremens (HCC)- (present on admission)   Assessment & Plan    Requiring ICU admission.  Status post phenobarbital protocol now transitioned to Seroquel and IV Precedex drip        Facial trauma- (present on admission)   Assessment & Plan    With orbital fracture and anisocoria  Dr. Terrazas recommended outpatient follow-up for treatment of his fracture after swelling has improved  Evaluated by ophthalmology who recommended clinical monitoring and outpatient follow-up    Continue Augmentin to complete 10 day course given sinus hemorrhage and increased risk of sinusitis          Alcohol abuse- (present on admission)   Assessment & Plan     on  cessation when he is lucid        Pancytopenia (HCC)- (present on admission)   Assessment & Plan    Likely secondary to alcohol-inducted bone marrow suppression.   This has now improved.        Electrolyte abnormality   Assessment & Plan    Hypokalemia  K replacement and follow daily BMPs while in the ICU        Alcohol withdrawal seizure with delirium (HCC)   Assessment & Plan    Seizure precautions          Tobacco use- (present on admission)   Assessment & Plan    Nicotine patch          Quality-Core Measures   Reviewed items::  Labs reviewed and Medications reviewed  Joshua catheter::  Critically Ill - Requiring Accurate Measurement of Urinary Output  DVT prophylaxis pharmacological::  Enoxaparin (Lovenox)

## 2018-07-04 NOTE — PROGRESS NOTES
Pulmonary Critical Care Progress Note        Admit: 6/26/2018    Chief Complaint: severe agitation and alcohol withdrawal seizures    History of Present Illness: 37 y.o. male with a history of alcohol abuse was admitted for alcohol withdrawal seizures and severe alcohol withdrawal syndrome     Review of Systems   Constitutional: Negative for chills and fever.   HENT: Negative for congestion.    Eyes: Negative for blurred vision and double vision.   Respiratory: Positive for cough and sputum production. Negative for shortness of breath.    Cardiovascular: Negative for chest pain and palpitations.   Gastrointestinal: Negative for abdominal pain, nausea and vomiting.   Neurological: Negative for focal weakness.   Psychiatric/Behavioral: Negative for depression.   All other systems reviewed and are negative.      Interval Events:  24 hour interval history reviewed   Tm 99.3  +10cc over last 24hr, +2.8L since admit  No cxr this am  K 5.3  Cr 0.65  Mg 2.2    BM 6/29  Tolerating tube feeds  More awake today    precedex 0.8  Off pheno bryant 7/2  Seroquel 50 q8  Augmentin     PFSH:  No change.    Physical Exam   Constitutional: He appears well-developed and well-nourished.   HENT:   Head: Normocephalic and atraumatic.   Eyes: No scleral icterus.   Right pupil reactive to light, left weakly reactive and about 5mm     Neck: No tracheal deviation present.   Significant bruising to left neck region  improving   Cardiovascular: Normal rate and regular rhythm.    Pulmonary/Chest: He has no wheezes. He has no rales.   diminished   Abdominal: Soft. He exhibits no distension. There is no tenderness.   Musculoskeletal: He exhibits no edema.   Neurological: No cranial nerve deficit.   Skin: Skin is warm and dry.   Psychiatric:   Delirious    Nursing note and vitals reviewed.  unchanged    Respiratory:     Pulse Oximetry: 92 %    HemoDynamics:  Pulse: 68, Heart Rate (Monitored): 68  NIBP: (!) 97/67          Neuro:    Fluids:  Intake/Output       07/02/18 0700 - 07/03/18 0659 07/03/18 0700 - 07/04/18 0659 07/04/18 0700 - 07/05/18 0659      0700-1859 1900-0659 Total 0700-1859 1900-0659 Total 0700-1859 1900-0659 Total       Intake    I.V.  15.1  69.2 84.3  156.1  174 330.1  --  -- --    Precedex Volume 15.1 69.2 84.3 156.1 174 330.1 -- -- --    Other  60  30 90  --  120 120  --  -- --    Medications (P.O./ Enteral Liquids) 60 30 90 -- 120 120 -- -- --    Enteral  900  730 1630  960  620 1580  --  -- --    Free Water / Tube Flush 60 30 90 120 60 180 -- -- --    Intake (mL) (Enteral Tube Left Nare Cortrak Gastric Feeding Tube)   -- -- --    Total Intake 975.1 829.2 1804.3 1116.1 914 2030.1 -- -- --       Output    Urine  590  1000 1590  1025  995 2020  --  -- --    Output (mL) ([REMOVED] Urinary Catheter Indwelling Catheter 16) 590 1000 1590 0836 669 5037 -- -- --    Output (mL) (Urinary Catheter) -- -- -- -- 135 135 -- -- --    Drains  0  -- 0  --  0 0  --  -- --    Residual Amount (ml) (Discarded) 0 -- 0 -- 0 0 -- -- --    Total Output 590 1000 1590 2574 591 2279 -- -- --       Net I/O     385.1 -170.8 214.3 91.1 -81 10.1 -- -- --        Weight: 85.5 kg (188 lb 7.9 oz)  Recent Labs      07/01/18   0700  07/02/18   0443  07/03/18   0410   SODIUM  139  139  139   POTASSIUM  3.9  3.6  4.5   CHLORIDE  104  106  102   CO2  27  23  27   BUN  6*  8  10   CREATININE  0.50  0.44*  0.57   MAGNESIUM   --    --   2.0   CALCIUM  9.3  9.0  9.6       GI/Nutrition:    Liver Function  Recent Labs      07/01/18   0700  07/02/18   0443  07/03/18   0410   ALTSGPT  24  16  16   ASTSGOT  22  20  19   ALKPHOSPHAT  69  50  66   TBILIRUBIN  0.5  0.4  0.5   PREALBUMIN   --   15.0*   --    GLUCOSE  112*  102*  118*       Heme:  Recent Labs      07/01/18   0700 07/02/18   0443  07/03/18   0410   RBC  3.89*  3.82*  3.91*   HEMOGLOBIN  12.9*  12.9*  13.1*   HEMATOCRIT  37.9*  37.7*  38.5*   PLATELETCT  118*  149*  193        Infectious Disease:  Monitored Temp 2  Av.7 °C (98.1 °F)  Min: 33.6 °C (92.5 °F)  Max: 37.4 °C (99.3 °F)  Temp  Av.8 °C (98.2 °F)  Min: 36.8 °C (98.2 °F)  Max: 36.8 °C (98.2 °F)  Micro: reviewed  Recent Labs      18   0700  18   0443  18   0410   WBC  4.9  4.4*  4.8   NEUTSPOLYS  62.80  55.10  59.60   LYMPHOCYTES  15.00*  19.50*  20.80*   MONOCYTES  17.90*  20.80*  14.60*   EOSINOPHILS  2.70  3.40  3.80   BASOPHILS  1.20  0.70  0.80   ASTSGOT  22  20  19   ALTSGPT  24  16  16   ALKPHOSPHAT  69  50  66   TBILIRUBIN  0.5  0.4  0.5     Current Facility-Administered Medications   Medication Dose Frequency Provider Last Rate Last Dose   • polyethylene glycol/lytes (MIRALAX) PACKET 1 Packet  1 Packet BID Maco Brown M.D.   1 Packet at 18   • dexmedetomidine (PRECEDEX) 400 mcg in D5W 100 mL infusion  0.1-1.5 mcg/kg/hr Continuous Andrea Hernandez M.D. 17.4 mL/hr at 18 0400 0.8 mcg/kg/hr at 18 0400   • QUEtiapine (SEROQUEL) tablet 50 mg  50 mg Q8HR Andrea Hernandez M.D.   50 mg at 18 0244   • enoxaparin (LOVENOX) inj 40 mg  40 mg DAILY Maco Brown M.D.   40 mg at 18 0807   • amoxicillin-clavulanate (AUGMENTIN) 875-125 MG per tablet 1 Tab  1 Tab Q12HRS Maco Brown M.D.   1 Tab at 18 2100   • ondansetron (ZOFRAN ODT) dispertab 4 mg  4 mg Q4HRS PRN Maco Brown M.D.       • MD ALERT...Adult ICU Electrolyte Replacement per Pharmacy Protocol   PRN Maco Brown M.D.       • Pharmacy Consult: Enteral tube feeding - review meds/change route/product selection  1 Each PRN Maco Brown M.D.       • senna-docusate (PERICOLACE or SENOKOT S) 8.6-50 MG per tablet 2 Tab  2 Tab BID Kristin Martins M.D.   2 Tab at 18    And   • polyethylene glycol/lytes (MIRALAX) PACKET 1 Packet  1 Packet QDAY PRN Kristin Martins M.D.        And   • magnesium hydroxide (MILK OF MAGNESIA) suspension 30 mL  30 mL QDAY PRN  Kristin Martins M.D.        And   • bisacodyl (DULCOLAX) suppository 10 mg  10 mg QDAY PRN Kristin Martins M.D.       • HYDROcodone-acetaminophen (NORCO) 5-325 MG per tablet 1 Tab  1 Tab Q6HRS PRN Kristin Martins M.D.   1 Tab at 07/02/18 0800   • promethazine (PHENERGAN) tablet 12.5-25 mg  12.5-25 mg Q4HRS PRN Kristin Martins M.D.       • enalaprilat (VASOTEC) injection 1.25 mg  1.25 mg Q6HRS PRN Jose Alfredo Domingo M.D.       • ondansetron (ZOFRAN) syringe/vial injection 4 mg  4 mg Q4HRS PRN Jose Alfredo Domingo M.D.       • promethazine (PHENERGAN) suppository 12.5-25 mg  12.5-25 mg Q4HRS PRN Jose Alfredo Domingo M.D.       • prochlorperazine (COMPAZINE) injection 5-10 mg  5-10 mg Q4HRS PRN Jose Alfredo Domingo M.D.       • nicotine (NICODERM) 14 MG/24HR 14 mg  14 mg Daily-0600 Jose Alfredo Domingo M.D.   14 mg at 07/04/18 0601   • bacitracin-polymyxin b (POLYSPORIN) 500-47998 UNIT/GM ointment   BID Jhonatan Norwood M.D.   1 Each at 07/03/18 2011     Last reviewed on 6/25/2018  9:29 PM by Yazmin Zhang Ph    Quality  Measures:  Medications reviewed, Labs reviewed and Radiology images reviewed                    Problems/Plan:    Acute Alcohol Withdrawal Syndrome   - s/p phenobarb protocol 6/27-7/2   - continue to titrate precedex   - continue q1 hour neuro checks   - increase seroquel to 100 tid   - aspiration/sz precautions   - prn haldol     Acute Alcohol Withdrawal Seizure   - cont seizure precautions    Acute Left Orbital Fracture   - plastics discussed in ER and no surgical intervention   - no eye involvement    Acute Left Medial/Lateral Maxillary Wall Fractures   - no surgical intervention at this time   - cont augmentin 875mg BID for total 10 days    Thrombocytopenia   - resolved    Hypokalemia   - I am replacing to keep > 4.0    Hypomagnesemia   - I am replacing to keep > 2.0    Alcohol Abuse   - needs counseling when appropriate       Pt remains critically ill with unstable neuro process still requiring  active titration of precedex infusion and continuous airway evaluation/monitoring for need for intubation.  He is at extreme risk of clinical deterioration, worsening vital organ dysfunction, and death without the above critical care interventions.    Discussed patient condition and risk of morbidity and/or mortality with RN, RT, Therapies, Pharmacy, Dietary, , Charge nurse / hot rounds and hospitalist.  The patient remains critically ill.  Critical care time = 33 minutes in directly providing and coordinating critical care and extensive data review.  No time overlap and excludes procedures.

## 2018-07-04 NOTE — DIETARY
Nutrition support weekly update:  Day 9 of admit.  Denilson Chen is a 37 y.o. male with admitting DX of DTs.    Tube feeding initiated on 6/27. Current TF via gastric Cortrak: Fibersource HN @ goal rate 70 ml/hr, providing 2016 kcal, 91 grams protein, and 1361 ml free water per day.    Assessment:  Weight 85.5 kg with BMI 26.3  Weight consistent with admit weight of 85 kg on 6/26.  REE per MSJ x1.1-1.2 = 8560-0163 kcal/day    Evaluation:   1. Pt remains NPO with TF for full nutritional support.   2. Pt's mental status is not conducive to swallow assessment/PO diet @ this time.   3. Labs reviewed. Pre-albumin 15.0 with CRP 5.03 on 7/2.  4. Meds reviewed. Miralax and senna. Last BM 7/1.  5. Current feeding regimen remains appropriate.    Recommendations/Plan:  Continue TF formula and rate.  PO diet when safe and appropriate.    RD following.

## 2018-07-05 LAB
ALBUMIN SERPL BCP-MCNC: 4.1 G/DL (ref 3.2–4.9)
ALBUMIN/GLOB SERPL: 1.2 G/DL
ALP SERPL-CCNC: 75 U/L (ref 30–99)
ALT SERPL-CCNC: 28 U/L (ref 2–50)
ANION GAP SERPL CALC-SCNC: 10 MMOL/L (ref 0–11.9)
AST SERPL-CCNC: 32 U/L (ref 12–45)
BASOPHILS # BLD AUTO: 1.2 % (ref 0–1.8)
BASOPHILS # BLD: 0.07 K/UL (ref 0–0.12)
BILIRUB SERPL-MCNC: 0.5 MG/DL (ref 0.1–1.5)
BUN SERPL-MCNC: 15 MG/DL (ref 8–22)
CALCIUM SERPL-MCNC: 10.1 MG/DL (ref 8.5–10.5)
CHLORIDE SERPL-SCNC: 98 MMOL/L (ref 96–112)
CO2 SERPL-SCNC: 26 MMOL/L (ref 20–33)
CREAT SERPL-MCNC: 0.66 MG/DL (ref 0.5–1.4)
EOSINOPHIL # BLD AUTO: 0.16 K/UL (ref 0–0.51)
EOSINOPHIL NFR BLD: 2.7 % (ref 0–6.9)
ERYTHROCYTE [DISTWIDTH] IN BLOOD BY AUTOMATED COUNT: 47.8 FL (ref 35.9–50)
GLOBULIN SER CALC-MCNC: 3.5 G/DL (ref 1.9–3.5)
GLUCOSE SERPL-MCNC: 124 MG/DL (ref 65–99)
HCT VFR BLD AUTO: 42.6 % (ref 42–52)
HGB BLD-MCNC: 14.3 G/DL (ref 14–18)
IMM GRANULOCYTES # BLD AUTO: 0.02 K/UL (ref 0–0.11)
IMM GRANULOCYTES NFR BLD AUTO: 0.3 % (ref 0–0.9)
LYMPHOCYTES # BLD AUTO: 1.17 K/UL (ref 1–4.8)
LYMPHOCYTES NFR BLD: 19.5 % (ref 22–41)
MAGNESIUM SERPL-MCNC: 2.2 MG/DL (ref 1.5–2.5)
MCH RBC QN AUTO: 32.7 PG (ref 27–33)
MCHC RBC AUTO-ENTMCNC: 33.6 G/DL (ref 33.7–35.3)
MCV RBC AUTO: 97.5 FL (ref 81.4–97.8)
MONOCYTES # BLD AUTO: 0.63 K/UL (ref 0–0.85)
MONOCYTES NFR BLD AUTO: 10.5 % (ref 0–13.4)
NEUTROPHILS # BLD AUTO: 3.95 K/UL (ref 1.82–7.42)
NEUTROPHILS NFR BLD: 65.8 % (ref 44–72)
NRBC # BLD AUTO: 0 K/UL
NRBC BLD-RTO: 0 /100 WBC
PLATELET # BLD AUTO: 266 K/UL (ref 164–446)
PMV BLD AUTO: 10.8 FL (ref 9–12.9)
POTASSIUM SERPL-SCNC: 4.5 MMOL/L (ref 3.6–5.5)
PROT SERPL-MCNC: 7.6 G/DL (ref 6–8.2)
RBC # BLD AUTO: 4.37 M/UL (ref 4.7–6.1)
SODIUM SERPL-SCNC: 134 MMOL/L (ref 135–145)
WBC # BLD AUTO: 6 K/UL (ref 4.8–10.8)

## 2018-07-05 PROCEDURE — G8978 MOBILITY CURRENT STATUS: HCPCS | Mod: CI

## 2018-07-05 PROCEDURE — 700102 HCHG RX REV CODE 250 W/ 637 OVERRIDE(OP): Performed by: INTERNAL MEDICINE

## 2018-07-05 PROCEDURE — G8988 SELF CARE GOAL STATUS: HCPCS | Mod: CI

## 2018-07-05 PROCEDURE — A9270 NON-COVERED ITEM OR SERVICE: HCPCS | Performed by: INTERNAL MEDICINE

## 2018-07-05 PROCEDURE — G8989 SELF CARE D/C STATUS: HCPCS | Mod: CI

## 2018-07-05 PROCEDURE — 97161 PT EVAL LOW COMPLEX 20 MIN: CPT

## 2018-07-05 PROCEDURE — 700111 HCHG RX REV CODE 636 W/ 250 OVERRIDE (IP): Performed by: HOSPITALIST

## 2018-07-05 PROCEDURE — A9270 NON-COVERED ITEM OR SERVICE: HCPCS | Performed by: FAMILY MEDICINE

## 2018-07-05 PROCEDURE — 700101 HCHG RX REV CODE 250: Performed by: FAMILY MEDICINE

## 2018-07-05 PROCEDURE — 700102 HCHG RX REV CODE 250 W/ 637 OVERRIDE(OP): Performed by: HOSPITALIST

## 2018-07-05 PROCEDURE — G8980 MOBILITY D/C STATUS: HCPCS | Mod: CI

## 2018-07-05 PROCEDURE — 85025 COMPLETE CBC W/AUTO DIFF WBC: CPT

## 2018-07-05 PROCEDURE — 83735 ASSAY OF MAGNESIUM: CPT

## 2018-07-05 PROCEDURE — 99232 SBSQ HOSP IP/OBS MODERATE 35: CPT | Performed by: INTERNAL MEDICINE

## 2018-07-05 PROCEDURE — G8987 SELF CARE CURRENT STATUS: HCPCS | Mod: CI

## 2018-07-05 PROCEDURE — A9270 NON-COVERED ITEM OR SERVICE: HCPCS | Performed by: HOSPITALIST

## 2018-07-05 PROCEDURE — 700102 HCHG RX REV CODE 250 W/ 637 OVERRIDE(OP): Performed by: FAMILY MEDICINE

## 2018-07-05 PROCEDURE — G8979 MOBILITY GOAL STATUS: HCPCS | Mod: CI

## 2018-07-05 PROCEDURE — 770022 HCHG ROOM/CARE - ICU (200)

## 2018-07-05 PROCEDURE — 80053 COMPREHEN METABOLIC PANEL: CPT

## 2018-07-05 PROCEDURE — 99232 SBSQ HOSP IP/OBS MODERATE 35: CPT | Performed by: HOSPITALIST

## 2018-07-05 PROCEDURE — 97165 OT EVAL LOW COMPLEX 30 MIN: CPT

## 2018-07-05 RX ADMIN — STANDARDIZED SENNA CONCENTRATE AND DOCUSATE SODIUM 2 TABLET: 8.6; 5 TABLET, FILM COATED ORAL at 08:06

## 2018-07-05 RX ADMIN — NICOTINE 14 MG: 14 PATCH, EXTENDED RELEASE TRANSDERMAL at 06:15

## 2018-07-05 RX ADMIN — QUETIAPINE FUMARATE 100 MG: 100 TABLET ORAL at 03:16

## 2018-07-05 RX ADMIN — AMOXICILLIN AND CLAVULANATE POTASSIUM 1 TABLET: 875; 125 TABLET, FILM COATED ORAL at 09:00

## 2018-07-05 RX ADMIN — HYDROCODONE BITARTRATE AND ACETAMINOPHEN 1 TABLET: 5; 325 TABLET ORAL at 13:12

## 2018-07-05 RX ADMIN — AMOXICILLIN AND CLAVULANATE POTASSIUM 1 TABLET: 875; 125 TABLET, FILM COATED ORAL at 21:00

## 2018-07-05 RX ADMIN — BISACODYL 10 MG: 10 SUPPOSITORY RECTAL at 13:22

## 2018-07-05 RX ADMIN — ENOXAPARIN SODIUM 40 MG: 100 INJECTION SUBCUTANEOUS at 08:06

## 2018-07-05 RX ADMIN — MAGNESIUM HYDROXIDE 30 ML: 400 SUSPENSION ORAL at 11:30

## 2018-07-05 RX ADMIN — HYDROCODONE BITARTRATE AND ACETAMINOPHEN 1 TABLET: 5; 325 TABLET ORAL at 21:50

## 2018-07-05 RX ADMIN — QUETIAPINE FUMARATE 100 MG: 100 TABLET ORAL at 18:17

## 2018-07-05 RX ADMIN — Medication 1 EACH: at 21:00

## 2018-07-05 RX ADMIN — Medication 1 EACH: at 08:06

## 2018-07-05 RX ADMIN — POLYETHYLENE GLYCOL 3350 1 PACKET: 17 POWDER, FOR SOLUTION ORAL at 08:06

## 2018-07-05 RX ADMIN — QUETIAPINE FUMARATE 100 MG: 100 TABLET ORAL at 11:28

## 2018-07-05 ASSESSMENT — GAIT ASSESSMENTS
GAIT LEVEL OF ASSIST: CONTACT GUARD ASSIST
DISTANCE (FEET): 350

## 2018-07-05 ASSESSMENT — COGNITIVE AND FUNCTIONAL STATUS - GENERAL
DRESSING REGULAR LOWER BODY CLOTHING: A LITTLE
TOILETING: A LITTLE
CLIMB 3 TO 5 STEPS WITH RAILING: A LITTLE
DAILY ACTIVITIY SCORE: 20
MOBILITY SCORE: 22
WALKING IN HOSPITAL ROOM: A LITTLE
PERSONAL GROOMING: A LITTLE
SUGGESTED CMS G CODE MODIFIER MOBILITY: CJ
SUGGESTED CMS G CODE MODIFIER DAILY ACTIVITY: CJ
HELP NEEDED FOR BATHING: A LITTLE

## 2018-07-05 ASSESSMENT — PAIN SCALES - GENERAL
PAINLEVEL_OUTOF10: 0
PAINLEVEL_OUTOF10: 5
PAINLEVEL_OUTOF10: 5
PAINLEVEL_OUTOF10: 2
PAINLEVEL_OUTOF10: 0

## 2018-07-05 ASSESSMENT — PATIENT HEALTH QUESTIONNAIRE - PHQ9
2. FEELING DOWN, DEPRESSED, IRRITABLE, OR HOPELESS: NOT AT ALL
1. LITTLE INTEREST OR PLEASURE IN DOING THINGS: NOT AT ALL
SUM OF ALL RESPONSES TO PHQ9 QUESTIONS 1 AND 2: 0

## 2018-07-05 ASSESSMENT — ACTIVITIES OF DAILY LIVING (ADL): TOILETING: INDEPENDENT

## 2018-07-05 NOTE — THERAPY
"Physical Therapy Evaluation completed.     Transfers: Sit to Stand: Supervised  Gait: Level Of Assist: Contact Guard Assist (mostly close SBA; CGA 2' to impulsivity) with No Equipment Needed       Plan of Care: Patient with no further skilled PT needs in the acute care setting at this time  Discharge Recommendations: Equipment: No Equipment Needed. See below    After initial evaluation and pt education, pt has no further skilled acute PT needs. He was able to demonstrate hallway ambulation without AD with no nedra LOB. CGA/close SBA was provided 2' to impulsivity though pt report gait mechanics and balance are baseline. Anticipate with increased OOB activity with staff, pt will self progress well as medical co -morbidities are managed. Anticiapte pt will be functionally capable of dc to home once medically cleared.  He may benefit from outpatient PT at later date for chronic ankle pain/issues though this was pre-exisiting per pt. Will be availble for dc planning as needed, though pt should continue to mobilize with nsg staff to self progress gait/balance and prevent deconditioning while here (note that pt normally wears ankle brace at [facility] which is likely affecting gait mechanics as well as he does not have currently)    See \"Rehab Therapy-Acute\" Patient Summary Report for complete documentation.     "

## 2018-07-05 NOTE — THERAPY
"Occupational Therapy Evaluation completed.   Functional Status:  Pt seated in chair at start of session. Supervision LB dressing.  Pt stood and walked unit without AD.  Pt reports he feels more steady when wearing shoes 2' to old ankle injury.  Pt declined grooming tasks and returned to chair.  Pt reports no concerns with self-care at this time.  Pt is slightly impulsive at times but suspect this is baseline behavior.  Plan of Care: Patient with no further skilled OT needs in the acute care setting at this time  Discharge Recommendations:  Equipment: No Equipment Needed. Post-acute therapy Currently anticipate no further skilled therapy needs once patient is discharged from the inpatient setting.    See \"Rehab Therapy-Acute\" Patient Summary Report for complete documentation.    "

## 2018-07-05 NOTE — CARE PLAN
Problem: Nutritional:  Goal: Achieve adequate nutritional intake  Patient will consume >50% of meals over 3-4 days.   Outcome: NOT MET  Diet to start.

## 2018-07-05 NOTE — PROGRESS NOTES
Pt is in NSR 60 to 80 all night.  Pt still confused at times but this AM is oriented but very impulsive.      PA-0.16  QRS- 0.08  QT-0.38

## 2018-07-05 NOTE — PROGRESS NOTES
[facility] Hospitalist Progress Note    Date of Service: 2018    Chief Complaint  37 y.o. male admitted 2018 with seizure.    Interval Problem Update  Mr. Chen has a history of alcohol abuse that was at the court house due to a speeding ticket where he was found to have a seizure. He was brought to the ER where he was found to have a left orbital fracture. He has been admitted to the ICU due to severe alcohol withdrawal.     Precedex drip has been turned off and he passed the bedside swallow. Mr. Chen was able to ambulate with some assistance with PT/OT and his nurse. He is more calm and cooperative today.   Consultants/Specialty  Critical care. I discussed his condition with Dr. Hernandez on ICU Hot Rounds.   Geraymoghada, ophthalmology   Wrye, plastic surgery  Disposition  medical        Review of Systems   Unable to perform ROS: Mental acuity      Physical Exam  Laboratory/Imaging   Hemodynamics  Temp (24hrs), Av.2 °C (99 °F), Min:36.7 °C (98.1 °F), Max:37.6 °C (99.7 °F)   Temperature: 36.7 °C (98.1 °F), Monitored Temp: 37.7 °C (99.9 °F)  Pulse  Av.7  Min: 42  Max: 177 Heart Rate (Monitored): 79  NIBP: 115/84      Respiratory      Respiration: (!) 10, Pulse Oximetry: 96 %     Work Of Breathing / Effort: Mild  RUL Breath Sounds: Clear, RML Breath Sounds: Clear, RLL Breath Sounds: Diminished, LOKESH Breath Sounds: Clear, LLL Breath Sounds: Diminished    Fluids    Intake/Output Summary (Last 24 hours) at 18 0740  Last data filed at 18 0600   Gross per 24 hour   Intake           2092.3 ml   Output             1345 ml   Net            747.3 ml       Nutrition  Orders Placed This Encounter   Procedures   • DIET NPO     Standing Status:   Standing     Number of Occurrences:   1     Order Specific Question:   Restrict to:     Answer:   Strict [1]     Physical Exam   Constitutional: He appears well-nourished. No distress.   HENT:     Left eye injected with periorbital bruising down to the left  neck.    Neck: Normal range of motion. Neck supple.        Cardiovascular: Regular rhythm and normal heart sounds.    No murmur heard.  Pulmonary/Chest: Effort normal and breath sounds normal. No respiratory distress. He has no wheezes.   Abdominal: Soft. He exhibits no distension. There is no tenderness.   Musculoskeletal: He exhibits no edema or tenderness.       Neurological: He is alert.   Mild confused.  He is generally oriented.   He moves his extremities.    Skin: Skin is warm and dry. He is not diaphoretic.   Bruise right knee.   Nursing note and vitals reviewed.      Recent Labs      07/03/18 0410 07/04/18   0736  07/05/18   0610   WBC  4.8  6.4  6.0   RBC  3.91*  4.41*  4.37*   HEMOGLOBIN  13.1*  14.7  14.3   HEMATOCRIT  38.5*  43.1  42.6   MCV  98.5*  97.7  97.5   MCH  33.5*  33.3*  32.7   MCHC  34.0  34.1  33.6*   RDW  47.2  47.5  47.8   PLATELETCT  193  220  266   MPV  10.8  10.5  10.8     Recent Labs      07/03/18 0410 07/04/18   0736  07/05/18   0610   SODIUM  139  137  134*   POTASSIUM  4.5  5.3  4.5   CHLORIDE  102  101  98   CO2  27  27  26   GLUCOSE  118*  93  124*   BUN  10  15  15   CREATININE  0.57  0.65  0.66   CALCIUM  9.6  9.9  10.1                      Assessment/Plan     * Delirium tremens (HCC)- (present on admission)   Assessment & Plan    Requiring ICU admission.  Status post phenobarbital protocol followed by an IV Precedex drip which has been stopped.        Facial trauma- (present on admission)   Assessment & Plan    With orbital fracture and anisocoria  Dr. Terrazas recommended outpatient follow-up for treatment of his fracture after swelling has improved  Evaluated by ophthalmology who recommended clinical monitoring and outpatient follow-up    Continue Augmentin to complete 10 day course given sinus hemorrhage and increased risk of sinusitis          Alcohol abuse- (present on admission)   Assessment & Plan    Cessation discussed        Pancytopenia (HCC)- (present on admission)    Assessment & Plan    Likely secondary to alcohol-inducted bone marrow suppression.   This has now resolved        Electrolyte abnormality- (present on admission)   Assessment & Plan    Hypokalemia  K replacement was given        Alcohol withdrawal seizure with delirium (HCC)   Assessment & Plan    No further seizures since admit        Tobacco use- (present on admission)   Assessment & Plan    Nicotine patch          Quality-Core Measures   Reviewed items::  Labs reviewed and Medications reviewed  DVT prophylaxis pharmacological::  Enoxaparin (Lovenox)

## 2018-07-05 NOTE — DISCHARGE PLANNING
Medical SW    Referral: Sw spoke to bedside RN. Pt's mom requesting a traffic court excuse letter. Pt has an appointment w/ , Ros Spence, in court on 6/9/18. If pt remains in-pt, he will be unable to attend.    Sw completed court excuse letter. Sw met w/ pt at bedside. With support from pt, Rocío faxed to his  at reported fax number [phone]. Sw received fax confirmation.      Plan: Sw to assist w/ d/c planning as needed.

## 2018-07-05 NOTE — DIETARY
Nutrition Services: Transition to PO diet    Admit day 10.  Pt with Micha, on TF @ goal.  Per RN, pt oriented today, passed BSE.   OK per PMA for regular diet and D/c Cortrak/TF.    Will monitor for adequate PO intake over 3-4 days.

## 2018-07-05 NOTE — PROGRESS NOTES
Pulmonary Critical Care Progress Note        Admit: 6/26/2018    Chief Complaint: severe agitation and alcohol withdrawal seizures    History of Present Illness: 37 y.o. male with a history of alcohol abuse was admitted for alcohol withdrawal seizures and severe alcohol withdrawal syndrome     Review of Systems   Constitutional: Negative for chills and fever.   HENT: Negative for congestion.    Eyes: Negative for blurred vision and double vision.   Respiratory: Positive for cough and sputum production. Negative for shortness of breath.    Cardiovascular: Negative for chest pain and palpitations.   Gastrointestinal: Negative for abdominal pain, nausea and vomiting.   Neurological: Negative for focal weakness.   Psychiatric/Behavioral: Negative for depression.   All other systems reviewed and are negative.  unchanged    Interval Events:  24 hour interval history reviewed   Tm 100  +658cc over last 24hr, +3.6L since admit  No cxr this am  K 4.5  Cr 0.66  Mg 2.2    BM 6/29  Tolerating tube feeds    precedex 0.2  Off pheno bryant 7/2  Seroquel 100 q8  Augmentin     PFSH:  No change.    Physical Exam   Constitutional: He appears well-developed and well-nourished.   HENT:   Head: Normocephalic and atraumatic.   Eyes: No scleral icterus.   Right pupil reactive to light, left weakly reactive and about 5mm     Neck: No tracheal deviation present.   Significant bruising to left neck region  improving   Cardiovascular: Normal rate and regular rhythm.    Pulmonary/Chest: He has no wheezes. He has no rales.   Abdominal: Soft. He exhibits no distension. There is no tenderness.   Musculoskeletal: He exhibits no edema.   Neurological: No cranial nerve deficit.   Skin: Skin is warm and dry.   Psychiatric:   Improving delirium    Nursing note and vitals reviewed.  unchanged    Respiratory:     Pulse Oximetry: 95 %    HemoDynamics:  Pulse: 72, Heart Rate (Monitored): 73  NIBP: (!) 99/65         Neuro:    Fluids:  Intake/Output        18 07 - 18 0659 18 07 - 18 0659 18 - 18 0659       Total  Total  Total       Intake    I.V.  156.1  208.8 364.9  165.3  48.2 213.5  --  -- --    Precedex Volume 156.1 208.8 364.9 165.3 48.2 213.5 -- -- --    Other  --  120 120  --  120 120  --  -- --    Medications (P.O./ Enteral Liquids) -- 120 120 -- 120 120 -- -- --    Enteral  960  760 1720  850  760 1610  --  -- --    Free Water / Tube Flush 120 60 180 90 60 150 -- -- --    Intake (mL) ([REMOVED] Enteral Tube Left Nare Cortrak Gastric Feeding Tube)  410 -- 410 -- -- --    Intake (mL) (Enteral Tube Cortrak Gastric Feeding Tube) -- -- --  -- -- --    Total Intake 1116.1 1088.8 2204.9 1015.3 928.2 1943.5 -- -- --       Output    Urine  1025  1085 2110  850  435 1285  --  -- --    Output (mL) ([REMOVED] Urinary Catheter Indwelling Catheter 16) 4500 943 0793 -- -- -- -- -- --    Output (mL) (Urinary Catheter) -- 225 225  -- -- --    Drains  --  0 0  --  0 0  --  -- --    Residual Amount (ml) (Discarded) -- 0 0 -- 0 0 -- -- --    Total Output 1025 1085 2110  -- -- --       Net I/O     91.1 3.8 94.9 165.3 493.2 658.5 -- -- --           Recent Labs      18   0410  18   0736   SODIUM  139  137   POTASSIUM  4.5  5.3   CHLORIDE  102  101   CO2  27  27   BUN  10  15   CREATININE  0.57  0.65   MAGNESIUM  2.0  2.2   CALCIUM  9.6  9.9       GI/Nutrition:    Liver Function  Recent Labs      18   0410  18   0736   ALTSGPT  16  26   ASTSGOT  19  38   ALKPHOSPHAT  66  77   TBILIRUBIN  0.5  0.5   GLUCOSE  118*  93       Heme:  Recent Labs      18   0410  18   0736   RBC  3.91*  4.41*   HEMOGLOBIN  13.1*  14.7   HEMATOCRIT  38.5*  43.1   PLATELETCT  193  220       Infectious Disease:  Monitored Temp 2  Av.4 °C (99.3 °F)  Min: 36.8 °C (98.2 °F)  Max: 37.8 °C (100 °F)  Temp  Av.2 °C (99 °F)   Min: 36.7 °C (98.1 °F)  Max: 37.6 °C (99.7 °F)  Micro: reviewed  Recent Labs      07/03/18   0410  07/04/18   0736   WBC  4.8  6.4   NEUTSPOLYS  59.60  70.20   LYMPHOCYTES  20.80*  13.60*   MONOCYTES  14.60*  11.10   EOSINOPHILS  3.80  3.30   BASOPHILS  0.80  1.20   ASTSGOT  19  38   ALTSGPT  16  26   ALKPHOSPHAT  66  77   TBILIRUBIN  0.5  0.5     Current Facility-Administered Medications   Medication Dose Frequency Provider Last Rate Last Dose   • QUEtiapine (SEROQUEL) tablet 100 mg  100 mg Q8HR Andrea Hernandez M.D.   100 mg at 07/05/18 0316   • polyethylene glycol/lytes (MIRALAX) PACKET 1 Packet  1 Packet BID Maco Brown M.D.   1 Packet at 07/04/18 2022   • dexmedetomidine (PRECEDEX) 400 mcg in D5W 100 mL infusion  0.1-1.5 mcg/kg/hr Continuous Andrea Hernandez M.D. 4.4 mL/hr at 07/05/18 0400 0.2 mcg/kg/hr at 07/05/18 0400   • enoxaparin (LOVENOX) inj 40 mg  40 mg DAILY Maco Brown M.D.   40 mg at 07/04/18 0818   • amoxicillin-clavulanate (AUGMENTIN) 875-125 MG per tablet 1 Tab  1 Tab Q12HRS Maco Brown M.D.   1 Tab at 07/04/18 2100   • ondansetron (ZOFRAN ODT) dispertab 4 mg  4 mg Q4HRS PRN Maco Brown M.D.       • MD ALERT...Adult ICU Electrolyte Replacement per Pharmacy Protocol   PRN Maco Brown M.D.       • Pharmacy Consult: Enteral tube feeding - review meds/change route/product selection  1 Each PRN Maco Brown M.D.       • senna-docusate (PERICOLACE or SENOKOT S) 8.6-50 MG per tablet 2 Tab  2 Tab BID Kristin Martins M.D.   2 Tab at 07/04/18 2022    And   • polyethylene glycol/lytes (MIRALAX) PACKET 1 Packet  1 Packet QDAY PRN Kristin Martins M.D.        And   • magnesium hydroxide (MILK OF MAGNESIA) suspension 30 mL  30 mL QDAY PRN Kristin Martins M.D.        And   • bisacodyl (DULCOLAX) suppository 10 mg  10 mg QDAY PRN Kristin Martins M.D.       • HYDROcodone-acetaminophen (NORCO) 5-325 MG per tablet 1 Tab  1 Tab Q6HRS PRN Kristin Martins,  M.D.   1 Tab at 07/02/18 0800   • promethazine (PHENERGAN) tablet 12.5-25 mg  12.5-25 mg Q4HRS PRN Kristin Martins M.D.       • enalaprilat (VASOTEC) injection 1.25 mg  1.25 mg Q6HRS PRN Jose Alfredo Domingo M.D.       • ondansetron (ZOFRAN) syringe/vial injection 4 mg  4 mg Q4HRS PRISAIAH Domingo M.D.       • promethazine (PHENERGAN) suppository 12.5-25 mg  12.5-25 mg Q4HRS PRISAIAH Domingo M.D.       • prochlorperazine (COMPAZINE) injection 5-10 mg  5-10 mg Q4HRS PRISAIAH Domingo M.D.       • nicotine (NICODERM) 14 MG/24HR 14 mg  14 mg Daily-0600 Jose Alfredo Domingo M.D.   14 mg at 07/04/18 0601   • bacitracin-polymyxin b (POLYSPORIN) 500-16246 UNIT/GM ointment   BID Jhonatan Norwood M.D.   1 Each at 07/04/18 2022     Last reviewed on 6/25/2018  9:29 PM by Nupur Nguyen    Quality  Measures:  Medications reviewed, Labs reviewed and Radiology images reviewed                    Problems/Plan:    Acute Alcohol Withdrawal Syndrome   - s/p phenobarb protocol 6/27-7/2   - continue to titrate precedex   - continue q1 hour neuro checks   - seroquel 100 tid   - aspiration/sz precautions   - prn haldol     Acute Alcohol Withdrawal Seizure   - cont seizure precautions    Acute Left Orbital Fracture   - plastics discussed in ER and no surgical intervention   - no eye involvement    Constipation   - no BM since 6/29   - advancing bowel regimen   - add suppository/enema     Acute Left Medial/Lateral Maxillary Wall Fractures   - no surgical intervention at this time   - cont augmentin 875mg BID for total 10 days    Thrombocytopenia   - resolved    Hypokalemia   - I am replacing to keep > 4.0    Hypomagnesemia   - I am replacing to keep > 2.0    Alcohol Abuse   - needs counseling when appropriate       Discussed patient condition and risk of morbidity and/or mortality with RN, RT, Therapies, Pharmacy, Dietary, , Charge nurse / hot rounds and hospitalist.

## 2018-07-05 NOTE — PROGRESS NOTES
Bedside swallow evaluation performed. Pt. Swallows thin liquids with no coughing/gagging. Passed bedside swallow eval.

## 2018-07-05 NOTE — CARE PLAN
Pt. Overall stable today. Neuro exam improved, precedex gtt stopped. Joshua removed, Cortrak Removed. Bedside swallow eval passed. Regular diet. Bowel meds given, pt. Downgraded to medical status.     Problem: Bowel/Gastric:  Goal: Normal bowel function is maintained or improved  Outcome: PROGRESSING SLOWER THAN EXPECTED  Pt. Has increasing abd discomfort and feeling of constipation. Bowel meds given, see eMAR. Frequent toileting provdied    Problem: Psychosocial Needs:  Goal: Level of anxiety will decrease  Outcome: MET Date Met: 07/05/18  Pt. Mental status much improved from previous days. Pt. Calm, agreeable to care and pleasant. No complaints of anxiety

## 2018-07-05 NOTE — DISCHARGE PLANNING
Medical SW    Sw attended AM IDT Rounds.    RN reports, pt up in chair this AM, oriented this AM ambulated, cortrak w/ tube feed at goal, passed bedside swallow w/ RN, aguirre removed, will remove cortrak,     Plan: Sw to assist w/ d/c planning as needed.

## 2018-07-06 VITALS
HEIGHT: 71 IN | OXYGEN SATURATION: 98 % | WEIGHT: 187.17 LBS | HEART RATE: 75 BPM | DIASTOLIC BLOOD PRESSURE: 79 MMHG | BODY MASS INDEX: 26.2 KG/M2 | SYSTOLIC BLOOD PRESSURE: 121 MMHG | RESPIRATION RATE: 18 BRPM | TEMPERATURE: 98.1 F

## 2018-07-06 PROCEDURE — 99232 SBSQ HOSP IP/OBS MODERATE 35: CPT | Performed by: INTERNAL MEDICINE

## 2018-07-06 PROCEDURE — A9270 NON-COVERED ITEM OR SERVICE: HCPCS | Performed by: HOSPITALIST

## 2018-07-06 PROCEDURE — 700102 HCHG RX REV CODE 250 W/ 637 OVERRIDE(OP): Performed by: HOSPITALIST

## 2018-07-06 PROCEDURE — 700102 HCHG RX REV CODE 250 W/ 637 OVERRIDE(OP): Performed by: INTERNAL MEDICINE

## 2018-07-06 PROCEDURE — A9270 NON-COVERED ITEM OR SERVICE: HCPCS | Performed by: INTERNAL MEDICINE

## 2018-07-06 PROCEDURE — 99239 HOSP IP/OBS DSCHRG MGMT >30: CPT | Performed by: HOSPITALIST

## 2018-07-06 PROCEDURE — 700101 HCHG RX REV CODE 250: Performed by: FAMILY MEDICINE

## 2018-07-06 RX ADMIN — AMOXICILLIN AND CLAVULANATE POTASSIUM 1 TABLET: 875; 125 TABLET, FILM COATED ORAL at 10:00

## 2018-07-06 RX ADMIN — Medication 1 EACH: at 10:00

## 2018-07-06 RX ADMIN — QUETIAPINE FUMARATE 100 MG: 100 TABLET ORAL at 03:35

## 2018-07-06 RX ADMIN — HYDROCODONE BITARTRATE AND ACETAMINOPHEN 1 TABLET: 5; 325 TABLET ORAL at 05:21

## 2018-07-06 ASSESSMENT — ENCOUNTER SYMPTOMS
PALPITATIONS: 0
FEVER: 0
SHORTNESS OF BREATH: 0
COUGH: 0
SPUTUM PRODUCTION: 1
CHILLS: 0
ABDOMINAL PAIN: 0
WEAKNESS: 0
BLURRED VISION: 0
SPUTUM PRODUCTION: 0
NAUSEA: 0
DOUBLE VISION: 0
FOCAL WEAKNESS: 0
COUGH: 1
VOMITING: 0
DEPRESSION: 0

## 2018-07-06 ASSESSMENT — PAIN SCALES - GENERAL
PAINLEVEL_OUTOF10: 0
PAINLEVEL_OUTOF10: 0
PAINLEVEL_OUTOF10: 6
PAINLEVEL_OUTOF10: 0
PAINLEVEL_OUTOF10: 0

## 2018-07-06 NOTE — DISCHARGE INSTRUCTIONS
Discharge Instructions    Discharged to home by car with relative. Discharged via wheelchair, hospital escort: Yes.  Special equipment needed: Not Applicable    Be sure to schedule a follow-up appointment with your primary care doctor or any specialists as instructed.     Discharge Plan:   Diet Plan: Discussed  Activity Level: Discussed  Confirmed Follow up Appointment: Patient to Call and Schedule Appointment  Confirmed Symptoms Management: Discussed  Medication Reconciliation Updated: Yes  Pneumococcal Vaccine Administered/Refused: Not given - Patient refused pneumococcal vaccine  Influenza Vaccine Indication: Patient Refuses    I understand that a diet low in cholesterol, fat, and sodium is recommended for good health. Unless I have been given specific instructions below for another diet, I accept this instruction as my diet prescription.   Other diet: Regular    Special Instructions: None    · Is patient discharged on Warfarin / Coumadin?   No     Depression / Suicide Risk    As you are discharged from this [facility] facility, it is important to learn how to keep safe from harming yourself.    Recognize the warning signs:  · Abrupt changes in personality, positive or negative- including increase in energy   · Giving away possessions  · Change in eating patterns- significant weight changes-  positive or negative  · Change in sleeping patterns- unable to sleep or sleeping all the time   · Unwillingness or inability to communicate  · Depression  · Unusual sadness, discouragement and loneliness  · Talk of wanting to die  · Neglect of personal appearance   · Rebelliousness- reckless behavior  · Withdrawal from people/activities they love  · Confusion- inability to concentrate     If you or a loved one observes any of these behaviors or has concerns about self-harm, here's what you can do:  · Talk about it- your feelings and reasons for harming yourself  · Remove any means that you might use to hurt yourself  (examples: pills, rope, extension cords, firearm)  · Get professional help from the community (Mental Health, Substance Abuse, psychological counseling)  · Do not be alone:Call your Safe Contact- someone whom you trust who will be there for you.  · Call your local CRISIS HOTLINE 009-4246 or 690-557-4959  · Call your local Children's Mobile Crisis Response Team [place] (651) 024-2966 or www.[employer]  · Call the toll free National Suicide Prevention Hotlines   · National Suicide Prevention Lifeline 371-619-AJTJ (4002)  · National Hope Line Network 800-SUICIDE (245-4773)    Alcohol Abuse and Nutrition  Alcohol abuse is any pattern of alcohol consumption that harms your health, relationships, or work. Alcohol abuse can affect how your body breaks down and absorbs nutrients from food by causing your liver to work abnormally. Additionally, many people who abuse alcohol do not eat enough carbohydrates, protein, fat, vitamins, and minerals. This can cause poor nutrition (malnutrition) and a lack of nutrients (nutrient deficiencies), which can lead to further complications.  Nutrients that are commonly lacking (deficient) among people who abuse alcohol include:  · Vitamins.  ¨ Vitamin A. This is stored in your liver. It is important for your vision, metabolism, and ability to fight off infections (immunity).  ¨ B vitamins. These include vitamins such as folate, thiamin, and niacin. These are important in new cell growth and maintenance.  ¨ Vitamin C. This plays an important role in iron absorption, wound healing, and immunity.  ¨ Vitamin D. This is produced by your liver, but you can also get vitamin D from food. Vitamin D is necessary for your body to absorb and use calcium.  · Minerals.  ¨ Calcium. This is important for your bones and your heart and blood vessel (cardiovascular) function.  ¨ Iron. This is important for blood, muscle, and nervous system functioning.  ¨ Magnesium. This plays an important role  in muscle and nerve function, and it helps to control blood sugar and blood pressure.  ¨ Zinc. This is important for the normal function of your nervous system and digestive system (gastrointestinal tract).  Nutrition is an essential component of therapy for alcohol abuse. Your health care provider or dietitian will work with you to design a plan that can help restore nutrients to your body and prevent potential complications.  What is my plan?  Your dietitian may develop a specific diet plan that is based on your condition and any other complications you may have. A diet plan will commonly include:  · A balanced diet.  ¨ Grains: 6-8 oz per day.  ¨ Vegetables: 2-3 cups per day.  ¨ Fruits: 1-2 cups per day.  ¨ Meat and other protein: 5-6 oz per day.  ¨ Dairy: 2-3 cups per day.  · Vitamin and mineral supplements.  What do I need to know about alcohol and nutrition?  · Consume foods that are high in antioxidants, such as grapes, berries, nuts, green tea, and dark green and orange vegetables. This can help to counteract some of the stress that is placed on your liver by consuming alcohol.  · Avoid food and drinks that are high in fat and sugar. Foods such as sugared soft drinks, salty snack foods, and candy contain empty calories. This means that they lack important nutrients such as protein, fiber, and vitamins.  · Eat frequent meals and snacks. Try to eat 5-6 small meals each day.  · Eat a variety of fresh fruits and vegetables each day. This will help you get plenty of water, fiber, and vitamins in your diet.  · Drink plenty of water and other clear fluids. Try to drink at least 48-64 oz (1.5-2 L) of water per day.  · If you are a vegetarian, eat a variety of protein-rich foods. Pair whole grains with plant-based proteins at meals and snacks to obtain the greatest nutrient benefit from your food. For example, eat rice with beans, put peanut butter on whole-grain toast, or eat oatmeal with sunflower seeds.  · Soak  beans and whole grains overnight before cooking. This can help your body to absorb the nutrients more easily.  · Include foods fortified with vitamins and minerals in your diet. Commonly fortified foods include milk, orange juice, cereal, and bread.  · If you are malnourished, your dietitian may recommend a high-protein, high-calorie diet. This may include:  ¨ 2,000-3,000 calories (kilocalories) per day.  ¨  grams of protein per day.  · Your health care provider may recommend a complete nutritional supplement beverage. This can help to restore calories, protein, and vitamins to your body. Depending on your condition, you may be advised to consume this instead of or in addition to meals.  · Limit your intake of caffeine. Replace drinks like coffee and black tea with decaffeinated coffee and herbal tea.  · Eat a variety of foods that are high in omega fatty acids. These include fish, nuts and seeds, and soybeans. These foods may help your liver to recover and may also stabilize your mood.  · Certain medicines may cause changes in your appetite, taste, and weight. Work with your health care provider and dietitian to make any adjustments to your medicines and diet plan.  · Include other healthy lifestyle choices in your daily routine.  ¨ Be physically active.  ¨ Get enough sleep.  ¨ Spend time doing activities that you enjoy.  · If you are unable to take in enough food and calories by mouth, your health care provider may recommend a feeding tube. This is a tube that passes through your nose and throat, directly into your stomach. Nutritional supplement beverages can be given to you through the feeding tube to help you get the nutrients you need.  · Take vitamin or mineral supplements as recommended by your health care provider.  What foods can I eat?  Grains   Enriched pasta. Enriched rice. Fortified whole-grain bread. Fortified whole-grain cereal. Barley. Brown rice. Quinoa. Millet.  Vegetables   All fresh,  frozen, and canned vegetables. Spinach. Kale. Artichoke. Carrots. Winter squash and pumpkin. Sweet potatoes. Broccoli. Cabbage. Cucumbers. Tomatoes. Sweet peppers. Green beans. Peas. Corn.  Fruits   All fresh and frozen fruits. Berries. Grapes. Yung. Papaya. Guava. Cherries. Apples. Bananas. Peaches. Plums. Pineapple. Watermelon. Cantaloupe. Oranges. Avocado.  Meats and Other Protein Sources   Beef liver. Lean beef. Pork. Fresh and canned chicken. Fresh fish. Oysters. Sardines. Canned tuna. Shrimp. Eggs with yolks. Nuts and seeds. Peanut butter. Beans and lentils. Soybeans. Tofu.  Dairy   Whole, low-fat, and nonfat milk. Whole, low-fat, and nonfat yogurt. Cottage cheese. Sour cream. Hard and soft cheeses.  Beverages   Water. Herbal tea. Decaffeinated coffee. Decaffeinated green tea. 100% fruit juice. 100% vegetable juice. Instant breakfast shakes.  Condiments   Ketchup. Mayonnaise. Mustard. Salad dressing. Barbecue sauce.  Sweets and Desserts   Sugar-free ice cream. Sugar-free pudding. Sugar-free gelatin.  Fats and Oils   Butter. Vegetable oil, flaxseed oil, olive oil, and walnut oil.  Other   Complete nutrition shakes. Protein bars. Sugar-free gum.  The items listed above may not be a complete list of recommended foods or beverages. Contact your dietitian for more options.   What foods are not recommended?  Grains   Sugar-sweetened breakfast cereals. Flavored instant oatmeal. Fried breads.  Vegetables   Breaded or deep-fried vegetables.  Fruits   Dried fruit with added sugar. Candied fruit. Canned fruit in syrup.  Meats and Other Protein Sources   Breaded or deep-fried meats.  Dairy   Flavored milks. Fried cheese curds or fried cheese sticks.  Beverages   Alcohol. Sugar-sweetened soft drinks. Sugar-sweetened tea. Caffeinated coffee and tea.  Condiments   Sugar. Honey. Agave nectar. Molasses.  Sweets and Desserts   Chocolate. Cake. Cookies. Candy.  Other   Potato chips. Pretzels. Salted nuts. Candied nuts.  The  items listed above may not be a complete list of foods and beverages to avoid. Contact your dietitian for more information.   This information is not intended to replace advice given to you by your health care provider. Make sure you discuss any questions you have with your health care provider.  Document Released: 10/12/2006 Document Revised: 04/26/2017 Document Reviewed: 07/21/2015  [employer] Interactive Patient Education © 2017 [employer]    Alcohol Intoxication  Alcohol intoxication happens when a person cannot think clearly or function well (becomes impaired) after drinking alcohol. This condition can happen even after one drink. It can be dangerous, especially if:  · The person drank a lot of alcohol in a short amount of time (binge drinking).  · The person also took certain medicines or drugs.  If the intoxication is very bad, a breathing machine (ventilator) may be needed until the alcohol wears off.  Follow these instructions at home:  General instructions  · Do not drive after drinking alcohol.  · Have someone stay with you. You should not be left alone while you have this condition.  · Make sure you have enough fluid in your body. To do this:  ¨ Drink enough fluid to keep your pee (urine) clear or pale yellow.  ¨ Avoid caffeine. It can make you thirsty.  · Take over-the-counter and prescription medicines only as told by your doctor.  To prevent this condition:  · Limit alcohol intake to no more than 1 drink a day for nonpregnant women and 2 drinks a day for men. One drink equals 12 oz of beer, 5 oz of wine, or 1½ oz of hard liquor.  · Do not drink alcohol on an empty stomach.  · Avoid drinking alcohol if:  ¨ You are under the legal drinking age.  ¨ You are pregnant or may be pregnant.  ¨ You are taking medicines that you should not take with alcohol.  ¨ Your drinking causes your medical condition to get worse.  ¨ You need to drive or do activities that require your attention.  ¨ You have substance use  disorder.  If this condition happens again:  · Get help right away if you or someone you know:  ¨ Has medium or very bad trouble with:  § Movement (coordination).  § Speech.  § Memory.  § Attention.  ¨ Passes out (faints).  ¨ Is confused.  ¨ Is throwing up (vomiting).  · Do not leave someone with this condition alone.  Contact a doctor if:  · You do not feel better after a few days.  · You are having problems at work, at school, or at home because of drinking.  Get help right away if:  · You get shaky when you try to stop drinking.  · You start shaking and you cannot control it (have a seizure).  · You throw up blood. The blood may be bright red or look like coffee grounds.  · You see blood in your poop (stool). The blood may:  ¨ Be bright red.  ¨ Make your poop black and tarry and make it smell bad.  · You start to feel light-headed.  · You pass out.  If you ever feel like you may hurt yourself or others, or have thoughts about taking your own life, get help right away. You can go to your nearest emergency department or call:  · Your local emergency services (911 in the [place]  · A suicide crisis helpline, such as the National Suicide Prevention Lifeline at 1-255.237.7226. This is open 24 hours a day.  This information is not intended to replace advice given to you by your health care provider. Make sure you discuss any questions you have with your health care provider.  Document Released: 06/05/2009 Document Revised: 09/07/2017 Document Reviewed: 09/07/2017  [employer] Interactive Patient Education © 2017 [employer]      Seizure, Adult  When you have a seizure:  · Parts of your body may move.  · How aware or awake (conscious) you are may change.  · You may shake (convulse).  Some people have symptoms right before a seizure happens. These symptoms may include:  · Fear.  · Worry (anxiety).  · Feeling like you are going to throw up (nausea).  · Feeling like the room is spinning (vertigo).  · Feeling like you saw or  heard something before (nigel gomez).  · Odd tastes or smells.  · Changes in vision, such as seeing flashing lights or spots.  Seizures usually last from 30 seconds to 2 minutes. Usually, they are not harmful unless they last a long time.  Follow these instructions at home:  Medicines  · Take over-the-counter and prescription medicines only as told by your doctor.  · Avoid anything that may keep your medicine from working, such as alcohol.  Activity  · Do not do any activities that would be dangerous if you had another seizure, like driving or swimming. Wait until your doctor approves.  · If you live in the [place] ask your local DMV (department of [employer]) when you can drive.  · Rest.  Teaching others  · Teach friends and family what to do when you have a seizure. They should:  ¨ Lay you on the ground.  ¨ Protect your head and body.  ¨ Loosen any tight clothing around your neck.  ¨ Turn you on your side.  ¨ Stay with you until you are better.  ¨ Not hold you down.  ¨ Not put anything in your mouth.  ¨ Know whether or not you need emergency care.  General instructions  · Contact your doctor each time you have a seizure.  · Avoid anything that gives you seizures.  · Keep a seizure diary. Write down:  ¨ What you think caused each seizure.  ¨ What you remember about each seizure.  · Keep all follow-up visits as told by your doctor. This is important.  Contact a doctor if:  · You have another seizure.  · You have seizures more often.  · There is any change in what happens during your seizures.  · You continue to have seizures with treatment.  · You have symptoms of being sick or having an infection.  Get help right away if:  · You have a seizure:  ¨ That lasts longer than 5 minutes.  ¨ That is different than seizures you had before.  ¨ That makes it harder to breathe.  ¨ After you hurt your head.  · After a seizure, you cannot speak or use a part of your body.  · After a seizure, you are confused or have a bad  headache.  · You have two or more seizures in a row.  · You are having seizures more often.  · You do not wake up right after a seizure.  · You get hurt during a seizure.  In an emergency:  · These symptoms may be an emergency. Do not wait to see if the symptoms will go away. Get medical help right away. Call your local emergency services (911 in the [place] Do not drive yourself to the hospital.  This information is not intended to replace advice given to you by your health care provider. Make sure you discuss any questions you have with your health care provider.  Document Released: 06/05/2009 Document Revised: 08/30/2017 Document Reviewed: 08/30/2017  [employer] Interactive Patient Education © 2017 [employer].

## 2018-07-06 NOTE — PROGRESS NOTES
Pt A/O x 4; ambulating in room without difficulty; up to shower; notified to call for assistance if needed.

## 2018-07-06 NOTE — CARE PLAN
Problem: Communication  Goal: The ability to communicate needs accurately and effectively will improve  Outcome: MET Date Met: 07/06/18      Problem: Safety  Goal: Will remain free from injury  Outcome: MET Date Met: 07/06/18    Goal: Will remain free from falls  Outcome: MET Date Met: 07/06/18  Call light in reach    Problem: Infection  Goal: Will remain free from infection  Outcome: MET Date Met: 07/06/18      Problem: Venous Thromboembolism (VTW)/Deep Vein Thrombosis (DVT) Prevention:  Goal: Patient will participate in Venous Thrombosis (VTE)/Deep Vein Thrombosis (DVT)Prevention Measures  Outcome: MET Date Met: 07/06/18      Problem: Bowel/Gastric:  Goal: Normal bowel function is maintained or improved  Outcome: MET Date Met: 07/06/18    Goal: Will not experience complications related to bowel motility  Outcome: MET Date Met: 07/06/18      Problem: Knowledge Deficit  Goal: Knowledge of disease process/condition, treatment plan, diagnostic tests, and medications will improve  Outcome: MET Date Met: 07/06/18    Goal: Knowledge of the prescribed therapeutic regimen will improve  Outcome: MET Date Met: 07/06/18      Problem: Discharge Barriers/Planning  Goal: Patient's continuum of care needs will be met  Outcome: MET Date Met: 07/06/18      Problem: Pain Management  Goal: Pain level will decrease to patient's comfort goal  Outcome: MET Date Met: 07/06/18      Problem: Skin Integrity  Goal: Risk for impaired skin integrity will decrease  Outcome: MET Date Met: 07/06/18      Problem: Respiratory:  Goal: Respiratory status will improve  Outcome: MET Date Met: 07/06/18

## 2018-07-06 NOTE — DISCHARGE SUMMARY
Discharge Summary    CHIEF COMPLAINT ON ADMISSION  Chief Complaint   Patient presents with   • Seizure     Was at [facility] and had seizure. Hx of alcohol withdrawl seizures, last drink yesterday   • Facial Injury     facial fractures including left orbit. Left eye swollen shut       Reason for Admission  EMS     Admission Date  6/25/2018    CODE STATUS  Full Code    HPI & HOSPITAL COURSE  This is a 37 y.o. male here with seizure.    Mr. Chen has a history of alcohol abuse that was at the court [facility] in [place] due to a speeding ticket where he was found to have a seizure. He was brought to the ER where he was found to have a left orbital fracture. He has been admitted to the ICU due to severe alcohol withdrawal. Due to the fracture, he was seen by plastic surgery and ophthalmology and no surgery was indicated. He was treated with 10 days of augmentin given the risk of infection secondary to trauma of the sinus.      He was on a Precedex drip and phenobarbital IV both of which have been turned off and he passed the bedside swallow. Mr. Chen was able to ambulate today unassisted. His mentation is back to baseline. He called his mother and she will apparently have his brother come and pick him up and go back to [place].  We had a long discussion about alcohol cessation. Smoking cessation discussed as well.   Therefore, he is discharged in good and stable condition to home with close outpatient follow-up.    The patient met 2-midnight criteria for an inpatient stay at the time of discharge.    Discharge Date  7/6/18      DISCHARGE DIAGNOSES  Principal Problem:    Delirium tremens (HCC) POA: Yes  Active Problems:    Facial trauma POA: Yes    Alcohol abuse POA: Yes      Overview: Continue supplemental thiamine and folate. Alcohol withdrawal       surveillance.     Tobacco use POA: Yes    Alcohol withdrawal seizure with delirium (HCC) POA: Unknown    Electrolyte abnormality POA: Yes    Pancytopenia (HCC) POA:  Yes  Resolved Problems:    Thrombocytopenia (HCC) POA: Yes      FOLLOW UP  No future appointments  He will follow up at the [facility] in [place] where he has a PCP who will refer to Dr. Terrazas.  Luis Terrazas M.D.  [address]  [place] [address]  [phone]      Please call to schedule a follow up appointment. Self pay rate is $200.00 for your initial appointment.       MEDICATIONS ON DISCHARGE     Medication List      You have not been prescribed any medications.         Allergies  No Known Allergies    DIET  Orders Placed This Encounter   Procedures   • Diet Order Regular     Standing Status:   Standing     Number of Occurrences:   1     Order Specific Question:   Diet:     Answer:   Regular [1]           CONSULTATIONS  Nini, plastic surgery  Geraymovych, ophthalmology   Critical care    PROCEDURES  none    LABORATORY  Lab Results   Component Value Date    SODIUM 134 (L) 07/05/2018    POTASSIUM 4.5 07/05/2018    CHLORIDE 98 07/05/2018    CO2 26 07/05/2018    GLUCOSE 124 (H) 07/05/2018    BUN 15 07/05/2018    CREATININE 0.66 07/05/2018        Lab Results   Component Value Date    WBC 6.0 07/05/2018    HEMOGLOBIN 14.3 07/05/2018    HEMATOCRIT 42.6 07/05/2018    PLATELETCT 266 07/05/2018    Imaging:  DX-ABDOMEN FOR TUBE PLACEMENT   Final Result      1.  Feeding tube projects over the gastric body.      DX-ABDOMEN FOR TUBE PLACEMENT   Final Result      Cortrak feeding tube tip projects in the region of the gastric fundus laterally.      DX-CHEST-PORTABLE (1 VIEW)   Final Result         1.  No acute cardiopulmonary disease.      [id number] VIEW   Final Result         Feeding tube with tip projecting over the expected area of the third portion duodenum.      CT-MAXILLOFACIAL W/O PLUS RECONS   Final Result         Acute mildly displaced fracture of the left orbital floor with herniation of orbital fat.      There are also fractures of the left medial and lateral maxillary wall , similar to  prior exam and could be chronic.      There is blood in the left maxillary sinus.      Extensive soft tissue swelling about the left eyelid and left face.      OUTSIDE IMAGES-CT HEAD   Final Result      OUTSIDE IMAGES-CT FACE   Final Result      OUTSIDE IMAGES-CT CERVICAL SPINE   Final Result            Total time of the discharge process exceeds 34 minutes.

## 2018-07-06 NOTE — PROGRESS NOTES
Pulmonary Critical Care Progress Note        Admit: 6/26/2018    Chief Complaint: severe agitation and alcohol withdrawal seizures    History of Present Illness: 37 y.o. male with a history of alcohol abuse was admitted for alcohol withdrawal seizures and severe alcohol withdrawal syndrome     Review of Systems   Constitutional: Negative for chills and fever.   HENT: Negative for congestion.    Eyes: Negative for blurred vision and double vision.   Respiratory: Negative for cough, sputum production and shortness of breath.    Cardiovascular: Negative for chest pain and palpitations.   Gastrointestinal: Negative for abdominal pain, nausea and vomiting.   Neurological: Negative for focal weakness and weakness.   Psychiatric/Behavioral: Negative for depression.   All other systems reviewed and are negative.      Interval Events:  24 hour interval history reviewed   Tm 99  -187cc over last 24hr, +3.5L since admit  No cxr this am    BM 7/5  Mobilizing  No further delirium  Tolerating orals    Seroquel 100 q8  Augmentin     PFSH:  No change.    Physical Exam   Constitutional: He appears well-developed and well-nourished.   HENT:   Head: Normocephalic.   Nose: Nose normal.   Eyes: Right eye exhibits discharge. Left eye exhibits no discharge. No scleral icterus.   Neck: No tracheal deviation present.   Significant bruising to left neck region  improving   Cardiovascular: Normal rate and regular rhythm.    Pulmonary/Chest: No stridor. He has no wheezes. He has no rales.   Abdominal: Soft. He exhibits no distension. There is no tenderness.   Musculoskeletal: He exhibits no edema.   Neurological: No cranial nerve deficit.   Skin: Skin is warm and dry.   Psychiatric: He has a normal mood and affect.   Nursing note and vitals reviewed.      Respiratory:     Pulse Oximetry: 96 %    HemoDynamics:  Pulse: 78, Heart Rate (Monitored): 86  Blood Pressure: 121/79, NIBP: 121/78         Neuro:    Fluids:  Intake/Output       07/04/18  0700 - 07/05/18 0659 07/05/18 0700 - 07/06/18 0659 07/06/18 0700 - 07/07/18 0659      0700-1859 1900-0659 Total 0700-1859 1900-0659 Total 0700-1859 1900-0659 Total       Intake    P.O.  --  -- --  500  500 1000  --  -- --    P.O. -- -- --  -- -- --    I.V.  165.3  57 222.3  2.2  -- 2.2  --  -- --    Precedex Volume 165.3 57 222.3 2.2 -- 2.2 -- -- --    Other  --  120 120  --  -- --  --  -- --    Medications (P.O./ Enteral Liquids) -- 120 120 -- -- -- -- -- --    Enteral  850  900 1750  140  -- 140  --  -- --    Free Water / Tube Flush 90 60 150 -- -- -- -- -- --    Intake (mL) ([REMOVED] Enteral Tube Left Nare Cortrak Gastric Feeding Tube) 410 -- 410 -- -- -- -- -- --    Intake (mL) ([REMOVED] Enteral Tube Cortrak Gastric Feeding Tube)  140 -- 140 -- -- --    Total Intake 1015.3 1077 2092.3 642.2 500 1142.2 -- -- --       Output    Urine  850  495 1345  130  1200 1330  --  -- --    Number of Times Voided -- -- -- 27 x 5 x 32 x -- -- --    Urine Void (mL) (non-catheter) -- -- -- 130 1200 1330 -- -- --    Output (mL) ([REMOVED] Urinary Catheter)  -- -- -- -- -- --    Drains  --  0 0  --  -- --  --  -- --    Residual Amount (ml) (Discarded) -- 0 0 -- -- -- -- -- --    Stool  --  -- --  --  -- --  --  -- --    Number of Times Stooled -- -- -- 15 x 1 x 16 x -- -- --    Total Output  130 1200 1330 -- -- --       Net I/O     165.3 582 747.3 512.2 -700 -187.8 -- -- --           Recent Labs      07/04/18 0736 07/05/18 0610   SODIUM  137  134*   POTASSIUM  5.3  4.5   CHLORIDE  101  98   CO2  27  26   BUN  15  15   CREATININE  0.65  0.66   MAGNESIUM  2.2  2.2   CALCIUM  9.9  10.1       GI/Nutrition:    Liver Function  Recent Labs      07/04/18   0736  07/05/18   0610   ALTSGPT  26  28   ASTSGOT  38  32   ALKPHOSPHAT  77  75   TBILIRUBIN  0.5  0.5   GLUCOSE  93  124*       Heme:  Recent Labs      07/04/18 0736 07/05/18 0610   RBC  4.41*  4.37*   HEMOGLOBIN  14.7  14.3    HEMATOCRIT  43.1  42.6   PLATELETCT  220  266       Infectious Disease:  Temp  Av.1 °C (98.8 °F)  Min: 36.9 °C (98.4 °F)  Max: 37.2 °C (99 °F)  Micro: reviewed  Recent Labs      18   0736  18   0610   WBC  6.4  6.0   NEUTSPOLYS  70.20  65.80   LYMPHOCYTES  13.60*  19.50*   MONOCYTES  11.10  10.50   EOSINOPHILS  3.30  2.70   BASOPHILS  1.20  1.20   ASTSGOT  38  32   ALTSGPT  26  28   ALKPHOSPHAT  77  75   TBILIRUBIN  0.5  0.5     Current Facility-Administered Medications   Medication Dose Frequency Provider Last Rate Last Dose   • QUEtiapine (SEROQUEL) tablet 100 mg  100 mg Q8HR Andrea Hernandez M.D.   100 mg at 18 0335   • polyethylene glycol/lytes (MIRALAX) PACKET 1 Packet  1 Packet BID Maco Brown M.D.   1 Packet at 18 08   • enoxaparin (LOVENOX) inj 40 mg  40 mg DAILY Maco Brown M.D.   40 mg at 18 0806   • amoxicillin-clavulanate (AUGMENTIN) 875-125 MG per tablet 1 Tab  1 Tab Q12HRS Maco Brown M.D.   1 Tab at 18 2100   • ondansetron (ZOFRAN ODT) dispertab 4 mg  4 mg Q4HRS PRN Maco Brown M.D.       • senna-docusate (PERICOLACE or SENOKOT S) 8.6-50 MG per tablet 2 Tab  2 Tab BID Kristin Martins M.D.   2 Tab at 18 0806    And   • polyethylene glycol/lytes (MIRALAX) PACKET 1 Packet  1 Packet QDAY PRN Kristin Martins M.D.        And   • magnesium hydroxide (MILK OF MAGNESIA) suspension 30 mL  30 mL QDAY PRN Kristin Martins M.D.   30 mL at 18 1130    And   • bisacodyl (DULCOLAX) suppository 10 mg  10 mg QDAY PRN Kristin Martins M.D.   10 mg at 18 1322   • HYDROcodone-acetaminophen (NORCO) 5-325 MG per tablet 1 Tab  1 Tab Q6HRS PRN Kristin Martins M.D.   1 Tab at 18 0521   • promethazine (PHENERGAN) tablet 12.5-25 mg  12.5-25 mg Q4HRS PRN Kristin Martins M.D.       • enalaprilat (VASOTEC) injection 1.25 mg  1.25 mg Q6HRS PRN Jose Alfredo Domingo M.D.       • ondansetron (ZOFRAN) syringe/vial injection 4  mg  4 mg Q4HRS PRN Jose Alfredo Domingo M.D.       • promethazine (PHENERGAN) suppository 12.5-25 mg  12.5-25 mg Q4HRS PRISAIAH Domingo M.D.       • prochlorperazine (COMPAZINE) injection 5-10 mg  5-10 mg Q4HRS PRN Jose Alfredo Domingo M.D.       • nicotine (NICODERM) 14 MG/24HR 14 mg  14 mg Daily-0600 Jose Alfredo Domingo M.D.   14 mg at 07/05/18 0615   • bacitracin-polymyxin b (POLYSPORIN) 500-25744 UNIT/GM ointment   BID Jhonatan Norwood M.D.   1 Each at 07/05/18 2100     Last reviewed on 6/25/2018  9:29 PM by Yazmin Zhang [facility]    Quality  Measures:  Medications reviewed, Labs reviewed and Radiology images reviewed                    Problems/Plan:    Acute Alcohol Withdrawal Syndrome   - s/p phenobarb protocol 6/27-7/2   - wean off seroquel 100 tid   - prn haldol     Acute Alcohol Withdrawal Seizure   - resolved    Acute Left Orbital Fracture   - plastics discussed in ER and no surgical intervention   - no eye involvement    Constipation   - s/p BM 7/5    Acute Left Medial/Lateral Maxillary Wall Fractures   - no surgical intervention at this time   - cont augmentin 875mg BID for total 10 days    Thrombocytopenia   - resolved    Hypokalemia   - I am replacing to keep > 4.0    Hypomagnesemia   - I am replacing to keep > 2.0    Alcohol Abuse   - needs counseling when appropriate       Discussed patient condition and risk of morbidity and/or mortality with RN, RT, Therapies, Pharmacy, Dietary, , Charge nurse / hot rounds and hospitalist.

## 2018-07-06 NOTE — PROGRESS NOTES
Pt sitting on couch talking on phone; PIV was removed with no bleeding, bruising or complication; tip intact.  Pt dressed himself; discussed discharge paperwork with patient who verbalized understanding; understands he needs to call for follow up appointment with plastic surgery; phone number on discharge paperwork.   Waiting for transportation.     No signs of respiratory distress or c/o pain at this time.

## 2018-07-06 NOTE — PROGRESS NOTES
Phone call received from patient's mother; patient's brother will be coming to pick him up; they do live four hours away so it may take a while.     Pt sitting up in chair with no signs of distress or c/o pain at this time.     Call light in reach.

## 2018-07-06 NOTE — CARE PLAN
Problem: Infection  Goal: Will remain free from infection  Outcome: PROGRESSING AS EXPECTED  Patient will remain free from infection. Currently exhibiting no signs or symptoms of infection. Will continue to reassess the need for invasive devices (IV) and assess frequently for signs and symptoms of infection.    Problem: Pain Management  Goal: Pain level will decrease to patient's comfort goal  Outcome: PROGRESSING AS EXPECTED  Patient still requiring pharmacological interventions for pain. Norco helps patient achieve periods of 0 out of 10 on pain scale. Goal is to continue to achieve patient's desired pain goal of 0.

## 2018-07-11 NOTE — DOCUMENTATION QUERY
DOCUMENTATION QUERY    PROVIDERS: Please select “Cosign w/ note”to reply to query.    To better represent the severity of illness of your patient, please review the following information and exercise your independent professional judgment in responding to this query.     Pancytopenia is documented in the Progress Notes and Discharge Summary. Based upon the clinical findings, risk factors, treatment, and your professional opinion, please specify if this condition was present on admission or developed after admission.    Pancytopenia was present on admission  Pancytopenia developed after admission  Other clinical finding (please document)  Unable to determine        The medical record reflects the following:   Clinical Findings  Per H&P:  Thrombocytopenia (HCC)- (present on admission)   Assessment & Plan     Likely secondary to toxic effect of alcohol on bone marrow. Continue to monitor. Transfuse if drops below 10K.     7/2 Dr. Lerner assessment:   Pancytopenia (HCC)      Likely secondary to alcohol-inducted bone marrow suppression.   This has now resolved        7/3 Progress Note:  Pancytopenia (HCC)- (present on admission)   Assessment & Plan     Likely secondary to alcohol-inducted bone marrow suppression.     7/4 Progress Note:  Pancytopenia (HCC)- (present on admission)   Assessment & Plan     Likely secondary to alcohol-inducted bone marrow suppression.   This has now improved     7/5 Progress note:  Pancytopenia (HCC)- (present on admission)   Assessment & Plan     Likely secondary to alcohol-inducted bone marrow suppression.   This has now resolved             6/27 progress note:  Recent Labs    06/25/18  1935 06/27/18  0620   WBC 9.6 7.4   RBC 4.24* 3.98*   HEMOGLOBIN 14.0 13.7*   HEMATOCRIT 40.4* 37.6*   MCV 95.3 94.5   MCH 33.0 34.4*   MCHC 34.7 36.4*   RDW 48.6 45.8   PLATELETCT 70* 59*   MPV 11.0 12.1     7/5 progress note:  Recent Labs    07/03/18  0410 07/04/18  0736 07/05/18  0610   WBC 4.8 6.4 6.0    RBC 3.91* 4.41* 4.37*   HEMOGLOBIN 13.1* 14.7 14.3   HEMATOCRIT 38.5* 43.1 42.6   MCV 98.5* 97.7 97.5   MCH 33.5* 33.3* 32.7   MCHC 34.0 34.1 33.6*   RDW 47.2 47.5 47.8   PLATELETCT 193 220 266   MPV 10.8 10.5 10.8        Treatment  Thrombocytopenia: follow CBC, Transfuse if drops below 10K.  Acute Alcohol Withdrawal Syndrome              - s/p phenobarb protocol 6/27-7/2              - wean off seroquel 100 tid              - prn haldol   Risk Factors  Alcohol abuse with withdrawal, delirium tremens, seizure, hypokalemia, hypomagnesemia, thrombocytopenia, sinus hemorrhage   Location within medical record  History and Physical, Progress Notes and Lab Results      Thank you,   Kita Avina [facility]  Coding

## 2018-11-26 ENCOUNTER — HOSPITAL ENCOUNTER (INPATIENT)
Facility: MEDICAL CENTER | Age: 38
LOS: 3 days | DRG: 580 | End: 2018-11-29
Attending: EMERGENCY MEDICINE | Admitting: HOSPITALIST
Payer: COMMERCIAL

## 2018-11-26 ENCOUNTER — APPOINTMENT (OUTPATIENT)
Dept: RADIOLOGY | Facility: MEDICAL CENTER | Age: 38
DRG: 580 | End: 2018-11-26
Attending: EMERGENCY MEDICINE
Payer: COMMERCIAL

## 2018-11-26 DIAGNOSIS — S01.01XA LACERATION OF SCALP WITHOUT FOREIGN BODY, INITIAL ENCOUNTER: ICD-10-CM

## 2018-11-26 DIAGNOSIS — S01.01XA LACERATION OF SCALP, INITIAL ENCOUNTER: ICD-10-CM

## 2018-11-26 DIAGNOSIS — F10.929 ALCOHOLIC INTOXICATION WITH COMPLICATION (HCC): ICD-10-CM

## 2018-11-26 LAB
ABO GROUP BLD: NORMAL
ALBUMIN SERPL BCP-MCNC: 3.6 G/DL (ref 3.2–4.9)
ALBUMIN/GLOB SERPL: 1.8 G/DL
ALP SERPL-CCNC: 83 U/L (ref 30–99)
ALT SERPL-CCNC: 34 U/L (ref 2–50)
ANION GAP SERPL CALC-SCNC: 15 MMOL/L (ref 0–11.9)
APTT PPP: 29.7 SEC (ref 24.7–36)
AST SERPL-CCNC: 33 U/L (ref 12–45)
BILIRUB SERPL-MCNC: 0.4 MG/DL (ref 0.1–1.5)
BLD GP AB SCN SERPL QL: NORMAL
BUN SERPL-MCNC: 6 MG/DL (ref 8–22)
CALCIUM SERPL-MCNC: 7.1 MG/DL (ref 8.5–10.5)
CHLORIDE SERPL-SCNC: 114 MMOL/L (ref 96–112)
CO2 SERPL-SCNC: 21 MMOL/L (ref 20–33)
CREAT SERPL-MCNC: 0.51 MG/DL (ref 0.5–1.4)
ERYTHROCYTE [DISTWIDTH] IN BLOOD BY AUTOMATED COUNT: 53.1 FL (ref 35.9–50)
ETHANOL BLD-MCNC: 0.4 G/DL
GLOBULIN SER CALC-MCNC: 2 G/DL (ref 1.9–3.5)
GLUCOSE SERPL-MCNC: 137 MG/DL (ref 65–99)
HCT VFR BLD AUTO: 38.5 % (ref 42–52)
HGB BLD-MCNC: 13.1 G/DL (ref 14–18)
INR PPP: 1.01 (ref 0.87–1.13)
MCH RBC QN AUTO: 34.2 PG (ref 27–33)
MCHC RBC AUTO-ENTMCNC: 34 G/DL (ref 33.7–35.3)
MCV RBC AUTO: 100.5 FL (ref 81.4–97.8)
PLATELET # BLD AUTO: 218 K/UL (ref 164–446)
PMV BLD AUTO: 10 FL (ref 9–12.9)
POTASSIUM SERPL-SCNC: 3.3 MMOL/L (ref 3.6–5.5)
PROT SERPL-MCNC: 5.6 G/DL (ref 6–8.2)
PROTHROMBIN TIME: 13.4 SEC (ref 12–14.6)
RBC # BLD AUTO: 3.83 M/UL (ref 4.7–6.1)
RH BLD: NORMAL
SODIUM SERPL-SCNC: 150 MMOL/L (ref 135–145)
WBC # BLD AUTO: 10.2 K/UL (ref 4.8–10.8)

## 2018-11-26 PROCEDURE — 51702 INSERT TEMP BLADDER CATH: CPT

## 2018-11-26 PROCEDURE — 85730 THROMBOPLASTIN TIME PARTIAL: CPT

## 2018-11-26 PROCEDURE — 85027 COMPLETE CBC AUTOMATED: CPT

## 2018-11-26 PROCEDURE — 86850 RBC ANTIBODY SCREEN: CPT

## 2018-11-26 PROCEDURE — 86900 BLOOD TYPING SEROLOGIC ABO: CPT

## 2018-11-26 PROCEDURE — 99285 EMERGENCY DEPT VISIT HI MDM: CPT

## 2018-11-26 PROCEDURE — 85610 PROTHROMBIN TIME: CPT

## 2018-11-26 PROCEDURE — 0JQ10ZZ REPAIR FACE SUBCUTANEOUS TISSUE AND FASCIA, OPEN APPROACH: ICD-10-PCS | Performed by: SURGERY

## 2018-11-26 PROCEDURE — 700101 HCHG RX REV CODE 250: Performed by: HOSPITALIST

## 2018-11-26 PROCEDURE — 700111 HCHG RX REV CODE 636 W/ 250 OVERRIDE (IP): Performed by: HOSPITALIST

## 2018-11-26 PROCEDURE — 80053 COMPREHEN METABOLIC PANEL: CPT

## 2018-11-26 PROCEDURE — 83036 HEMOGLOBIN GLYCOSYLATED A1C: CPT

## 2018-11-26 PROCEDURE — 303105 HCHG CATHETER EXTRA

## 2018-11-26 PROCEDURE — 94760 N-INVAS EAR/PLS OXIMETRY 1: CPT

## 2018-11-26 PROCEDURE — 305948 HCHG GREEN TRAUMA ACT PRE-NOTIFY NO CC

## 2018-11-26 PROCEDURE — 36415 COLL VENOUS BLD VENIPUNCTURE: CPT

## 2018-11-26 PROCEDURE — 71045 X-RAY EXAM CHEST 1 VIEW: CPT

## 2018-11-26 PROCEDURE — 84443 ASSAY THYROID STIM HORMONE: CPT

## 2018-11-26 PROCEDURE — 86901 BLOOD TYPING SEROLOGIC RH(D): CPT

## 2018-11-26 PROCEDURE — 0JQ00ZZ REPAIR SCALP SUBCUTANEOUS TISSUE AND FASCIA, OPEN APPROACH: ICD-10-PCS | Performed by: SURGERY

## 2018-11-26 PROCEDURE — 96365 THER/PROPH/DIAG IV INF INIT: CPT

## 2018-11-26 PROCEDURE — 99221 1ST HOSP IP/OBS SF/LOW 40: CPT | Performed by: HOSPITALIST

## 2018-11-26 PROCEDURE — 700101 HCHG RX REV CODE 250

## 2018-11-26 PROCEDURE — 770020 HCHG ROOM/CARE - TELE (206)

## 2018-11-26 PROCEDURE — 700105 HCHG RX REV CODE 258: Performed by: HOSPITALIST

## 2018-11-26 PROCEDURE — 80307 DRUG TEST PRSMV CHEM ANLYZR: CPT

## 2018-11-26 RX ORDER — CALCIUM CHLORIDE 100 MG/ML
1 INJECTION INTRAVENOUS; INTRAVENTRICULAR ONCE
Status: DISCONTINUED | OUTPATIENT
Start: 2018-11-26 | End: 2018-11-26

## 2018-11-26 RX ORDER — LORAZEPAM 1 MG/1
1 TABLET ORAL EVERY 4 HOURS PRN
Status: DISCONTINUED | OUTPATIENT
Start: 2018-11-26 | End: 2018-11-28

## 2018-11-26 RX ORDER — HYDROMORPHONE HYDROCHLORIDE 1 MG/ML
0.5 INJECTION, SOLUTION INTRAMUSCULAR; INTRAVENOUS; SUBCUTANEOUS
Status: DISCONTINUED | OUTPATIENT
Start: 2018-11-26 | End: 2018-11-29 | Stop reason: HOSPADM

## 2018-11-26 RX ORDER — OXYCODONE HYDROCHLORIDE 10 MG/1
10 TABLET ORAL
Status: DISCONTINUED | OUTPATIENT
Start: 2018-11-26 | End: 2018-11-29 | Stop reason: HOSPADM

## 2018-11-26 RX ORDER — THIAMINE MONONITRATE (VIT B1) 100 MG
100 TABLET ORAL DAILY
Status: DISCONTINUED | OUTPATIENT
Start: 2018-11-27 | End: 2018-11-29 | Stop reason: HOSPADM

## 2018-11-26 RX ORDER — ONDANSETRON 4 MG/1
4 TABLET, ORALLY DISINTEGRATING ORAL EVERY 4 HOURS PRN
Status: DISCONTINUED | OUTPATIENT
Start: 2018-11-26 | End: 2018-11-29 | Stop reason: HOSPADM

## 2018-11-26 RX ORDER — PROMETHAZINE HYDROCHLORIDE 25 MG/1
12.5-25 SUPPOSITORY RECTAL EVERY 4 HOURS PRN
Status: DISCONTINUED | OUTPATIENT
Start: 2018-11-26 | End: 2018-11-29 | Stop reason: HOSPADM

## 2018-11-26 RX ORDER — LORAZEPAM 1 MG/1
3 TABLET ORAL
Status: DISCONTINUED | OUTPATIENT
Start: 2018-11-26 | End: 2018-11-28

## 2018-11-26 RX ORDER — PROMETHAZINE HYDROCHLORIDE 25 MG/1
12.5-25 TABLET ORAL EVERY 4 HOURS PRN
Status: DISCONTINUED | OUTPATIENT
Start: 2018-11-26 | End: 2018-11-29 | Stop reason: HOSPADM

## 2018-11-26 RX ORDER — LORAZEPAM 1 MG/1
2 TABLET ORAL
Status: DISCONTINUED | OUTPATIENT
Start: 2018-11-26 | End: 2018-11-28

## 2018-11-26 RX ORDER — LORAZEPAM 1 MG/1
4 TABLET ORAL
Status: DISCONTINUED | OUTPATIENT
Start: 2018-11-26 | End: 2018-11-28

## 2018-11-26 RX ORDER — CEFAZOLIN SODIUM 2 G/100ML
2 INJECTION, SOLUTION INTRAVENOUS EVERY 8 HOURS
Status: DISCONTINUED | OUTPATIENT
Start: 2018-11-27 | End: 2018-11-28

## 2018-11-26 RX ORDER — LORAZEPAM 2 MG/ML
0.5 INJECTION INTRAMUSCULAR EVERY 4 HOURS PRN
Status: DISCONTINUED | OUTPATIENT
Start: 2018-11-26 | End: 2018-11-28

## 2018-11-26 RX ORDER — LIDOCAINE HYDROCHLORIDE AND EPINEPHRINE BITARTRATE 20; .01 MG/ML; MG/ML
10 INJECTION, SOLUTION SUBCUTANEOUS ONCE
Status: COMPLETED | OUTPATIENT
Start: 2018-11-26 | End: 2018-11-26

## 2018-11-26 RX ORDER — LORAZEPAM 2 MG/ML
1.5 INJECTION INTRAMUSCULAR
Status: DISCONTINUED | OUTPATIENT
Start: 2018-11-26 | End: 2018-11-28

## 2018-11-26 RX ORDER — AMOXICILLIN 250 MG
2 CAPSULE ORAL 2 TIMES DAILY
Status: DISCONTINUED | OUTPATIENT
Start: 2018-11-26 | End: 2018-11-29 | Stop reason: HOSPADM

## 2018-11-26 RX ORDER — LIDOCAINE HYDROCHLORIDE AND EPINEPHRINE BITARTRATE 20; .01 MG/ML; MG/ML
INJECTION, SOLUTION SUBCUTANEOUS
Status: COMPLETED
Start: 2018-11-26 | End: 2018-11-26

## 2018-11-26 RX ORDER — ONDANSETRON 2 MG/ML
4 INJECTION INTRAMUSCULAR; INTRAVENOUS EVERY 4 HOURS PRN
Status: DISCONTINUED | OUTPATIENT
Start: 2018-11-26 | End: 2018-11-29 | Stop reason: HOSPADM

## 2018-11-26 RX ORDER — FOLIC ACID 1 MG/1
1 TABLET ORAL DAILY
Status: DISCONTINUED | OUTPATIENT
Start: 2018-11-27 | End: 2018-11-29 | Stop reason: HOSPADM

## 2018-11-26 RX ORDER — LORAZEPAM 2 MG/ML
2 INJECTION INTRAMUSCULAR
Status: DISCONTINUED | OUTPATIENT
Start: 2018-11-26 | End: 2018-11-28

## 2018-11-26 RX ORDER — POLYETHYLENE GLYCOL 3350 17 G/17G
1 POWDER, FOR SOLUTION ORAL
Status: DISCONTINUED | OUTPATIENT
Start: 2018-11-26 | End: 2018-11-29 | Stop reason: HOSPADM

## 2018-11-26 RX ORDER — BISACODYL 10 MG
10 SUPPOSITORY, RECTAL RECTAL
Status: DISCONTINUED | OUTPATIENT
Start: 2018-11-26 | End: 2018-11-29 | Stop reason: HOSPADM

## 2018-11-26 RX ORDER — LORAZEPAM 1 MG/1
0.5 TABLET ORAL EVERY 4 HOURS PRN
Status: DISCONTINUED | OUTPATIENT
Start: 2018-11-26 | End: 2018-11-28

## 2018-11-26 RX ORDER — SODIUM CHLORIDE 450 MG/100ML
INJECTION, SOLUTION INTRAVENOUS CONTINUOUS
Status: DISCONTINUED | OUTPATIENT
Start: 2018-11-26 | End: 2018-11-27

## 2018-11-26 RX ORDER — OXYCODONE HYDROCHLORIDE 5 MG/1
5 TABLET ORAL
Status: DISCONTINUED | OUTPATIENT
Start: 2018-11-26 | End: 2018-11-29 | Stop reason: HOSPADM

## 2018-11-26 RX ORDER — LORAZEPAM 2 MG/ML
1 INJECTION INTRAMUSCULAR
Status: DISCONTINUED | OUTPATIENT
Start: 2018-11-26 | End: 2018-11-28

## 2018-11-26 RX ADMIN — LIDOCAINE HYDROCHLORIDE AND EPINEPHRINE BITARTRATE 10 ML: 20; .01 INJECTION, SOLUTION SUBCUTANEOUS at 22:00

## 2018-11-26 RX ADMIN — LIDOCAINE HYDROCHLORIDE AND EPINEPHRINE 10 ML: 20; 10 INJECTION, SOLUTION INFILTRATION; PERINEURAL at 22:00

## 2018-11-26 RX ADMIN — POTASSIUM CHLORIDE: 2 INJECTION, SOLUTION, CONCENTRATE INTRAVENOUS at 23:22

## 2018-11-26 ASSESSMENT — LIFESTYLE VARIABLES: DO YOU DRINK ALCOHOL: YES

## 2018-11-27 PROBLEM — F10.939 ALCOHOL WITHDRAWAL (HCC): Status: ACTIVE | Noted: 2018-11-27

## 2018-11-27 PROBLEM — E83.42 HYPOMAGNESEMIA: Status: ACTIVE | Noted: 2018-11-27

## 2018-11-27 PROBLEM — S01.91XA LACERATION OF HEAD: Status: ACTIVE | Noted: 2018-11-27

## 2018-11-27 PROBLEM — D64.9 ANEMIA: Status: ACTIVE | Noted: 2018-11-27

## 2018-11-27 PROBLEM — E87.0 HYPERNATREMIA: Status: ACTIVE | Noted: 2018-11-27

## 2018-11-27 PROBLEM — E83.51 HYPOCALCEMIA: Status: ACTIVE | Noted: 2018-11-27

## 2018-11-27 PROBLEM — S09.90XA HEAD TRAUMA: Status: ACTIVE | Noted: 2018-11-27

## 2018-11-27 PROBLEM — F10.929 ALCOHOL INTOXICATION (HCC): Status: ACTIVE | Noted: 2018-11-27

## 2018-11-27 PROBLEM — E87.6 HYPOKALEMIA: Status: ACTIVE | Noted: 2018-11-27

## 2018-11-27 LAB
ABO GROUP BLD: NORMAL
ALBUMIN SERPL BCP-MCNC: 3.7 G/DL (ref 3.2–4.9)
ALBUMIN/GLOB SERPL: 1.4 G/DL
ALP SERPL-CCNC: 81 U/L (ref 30–99)
ALT SERPL-CCNC: 35 U/L (ref 2–50)
ANION GAP SERPL CALC-SCNC: 10 MMOL/L (ref 0–11.9)
ANION GAP SERPL CALC-SCNC: 11 MMOL/L (ref 0–11.9)
ANION GAP SERPL CALC-SCNC: 12 MMOL/L (ref 0–11.9)
ANION GAP SERPL CALC-SCNC: 13 MMOL/L (ref 0–11.9)
AST SERPL-CCNC: 34 U/L (ref 12–45)
BASOPHILS # BLD AUTO: 0.7 % (ref 0–1.8)
BASOPHILS # BLD: 0.08 K/UL (ref 0–0.12)
BILIRUB SERPL-MCNC: 0.4 MG/DL (ref 0.1–1.5)
BUN SERPL-MCNC: 3 MG/DL (ref 8–22)
BUN SERPL-MCNC: 3 MG/DL (ref 8–22)
BUN SERPL-MCNC: 4 MG/DL (ref 8–22)
BUN SERPL-MCNC: <3 MG/DL (ref 8–22)
CALCIUM SERPL-MCNC: 8.5 MG/DL (ref 8.5–10.5)
CALCIUM SERPL-MCNC: 8.7 MG/DL (ref 8.5–10.5)
CALCIUM SERPL-MCNC: 8.8 MG/DL (ref 8.5–10.5)
CALCIUM SERPL-MCNC: 9 MG/DL (ref 8.5–10.5)
CHLORIDE SERPL-SCNC: 100 MMOL/L (ref 96–112)
CHLORIDE SERPL-SCNC: 101 MMOL/L (ref 96–112)
CHLORIDE SERPL-SCNC: 104 MMOL/L (ref 96–112)
CHLORIDE SERPL-SCNC: 108 MMOL/L (ref 96–112)
CHOLEST SERPL-MCNC: 182 MG/DL (ref 100–199)
CO2 SERPL-SCNC: 25 MMOL/L (ref 20–33)
CO2 SERPL-SCNC: 26 MMOL/L (ref 20–33)
CREAT SERPL-MCNC: 0.49 MG/DL (ref 0.5–1.4)
CREAT SERPL-MCNC: 0.53 MG/DL (ref 0.5–1.4)
CREAT SERPL-MCNC: 0.54 MG/DL (ref 0.5–1.4)
CREAT SERPL-MCNC: 0.57 MG/DL (ref 0.5–1.4)
EOSINOPHIL # BLD AUTO: 0.01 K/UL (ref 0–0.51)
EOSINOPHIL NFR BLD: 0.1 % (ref 0–6.9)
ERYTHROCYTE [DISTWIDTH] IN BLOOD BY AUTOMATED COUNT: 52.9 FL (ref 35.9–50)
EST. AVERAGE GLUCOSE BLD GHB EST-MCNC: 120 MG/DL
GLOBULIN SER CALC-MCNC: 2.6 G/DL (ref 1.9–3.5)
GLUCOSE SERPL-MCNC: 121 MG/DL (ref 65–99)
GLUCOSE SERPL-MCNC: 123 MG/DL (ref 65–99)
GLUCOSE SERPL-MCNC: 127 MG/DL (ref 65–99)
GLUCOSE SERPL-MCNC: 130 MG/DL (ref 65–99)
HBA1C MFR BLD: 5.8 % (ref 0–5.6)
HCT VFR BLD AUTO: 38.2 % (ref 42–52)
HDLC SERPL-MCNC: 57 MG/DL
HGB BLD-MCNC: 12.8 G/DL (ref 14–18)
IMM GRANULOCYTES # BLD AUTO: 0.06 K/UL (ref 0–0.11)
IMM GRANULOCYTES NFR BLD AUTO: 0.5 % (ref 0–0.9)
LDLC SERPL CALC-MCNC: 99 MG/DL
LYMPHOCYTES # BLD AUTO: 1.29 K/UL (ref 1–4.8)
LYMPHOCYTES NFR BLD: 10.8 % (ref 22–41)
MAGNESIUM SERPL-MCNC: 1.4 MG/DL (ref 1.5–2.5)
MCH RBC QN AUTO: 32.7 PG (ref 27–33)
MCHC RBC AUTO-ENTMCNC: 33.5 G/DL (ref 33.7–35.3)
MCV RBC AUTO: 97.7 FL (ref 81.4–97.8)
MONOCYTES # BLD AUTO: 0.66 K/UL (ref 0–0.85)
MONOCYTES NFR BLD AUTO: 5.6 % (ref 0–13.4)
NEUTROPHILS # BLD AUTO: 9.79 K/UL (ref 1.82–7.42)
NEUTROPHILS NFR BLD: 82.3 % (ref 44–72)
NRBC # BLD AUTO: 0 K/UL
NRBC BLD-RTO: 0 /100 WBC
PHOSPHATE SERPL-MCNC: 3.5 MG/DL (ref 2.5–4.5)
PLATELET # BLD AUTO: 239 K/UL (ref 164–446)
PMV BLD AUTO: 9.4 FL (ref 9–12.9)
POTASSIUM SERPL-SCNC: 3.2 MMOL/L (ref 3.6–5.5)
POTASSIUM SERPL-SCNC: 3.3 MMOL/L (ref 3.6–5.5)
POTASSIUM SERPL-SCNC: 3.5 MMOL/L (ref 3.6–5.5)
POTASSIUM SERPL-SCNC: 3.8 MMOL/L (ref 3.6–5.5)
PROT SERPL-MCNC: 6.3 G/DL (ref 6–8.2)
RBC # BLD AUTO: 3.91 M/UL (ref 4.7–6.1)
RH BLD: NORMAL
SODIUM SERPL-SCNC: 136 MMOL/L (ref 135–145)
SODIUM SERPL-SCNC: 137 MMOL/L (ref 135–145)
SODIUM SERPL-SCNC: 141 MMOL/L (ref 135–145)
SODIUM SERPL-SCNC: 146 MMOL/L (ref 135–145)
TRIGL SERPL-MCNC: 132 MG/DL (ref 0–149)
TSH SERPL DL<=0.005 MIU/L-ACNC: 1.84 UIU/ML (ref 0.38–5.33)
WBC # BLD AUTO: 11.9 K/UL (ref 4.8–10.8)

## 2018-11-27 PROCEDURE — A9270 NON-COVERED ITEM OR SERVICE: HCPCS | Performed by: HOSPITALIST

## 2018-11-27 PROCEDURE — 85025 COMPLETE CBC W/AUTO DIFF WBC: CPT

## 2018-11-27 PROCEDURE — 700102 HCHG RX REV CODE 250 W/ 637 OVERRIDE(OP): Performed by: HOSPITALIST

## 2018-11-27 PROCEDURE — 36415 COLL VENOUS BLD VENIPUNCTURE: CPT

## 2018-11-27 PROCEDURE — 80048 BASIC METABOLIC PNL TOTAL CA: CPT

## 2018-11-27 PROCEDURE — 770020 HCHG ROOM/CARE - TELE (206)

## 2018-11-27 PROCEDURE — 700111 HCHG RX REV CODE 636 W/ 250 OVERRIDE (IP): Performed by: HOSPITALIST

## 2018-11-27 PROCEDURE — 84100 ASSAY OF PHOSPHORUS: CPT

## 2018-11-27 PROCEDURE — 700102 HCHG RX REV CODE 250 W/ 637 OVERRIDE(OP): Performed by: SURGERY

## 2018-11-27 PROCEDURE — 700105 HCHG RX REV CODE 258: Performed by: HOSPITALIST

## 2018-11-27 PROCEDURE — A9270 NON-COVERED ITEM OR SERVICE: HCPCS | Performed by: SURGERY

## 2018-11-27 PROCEDURE — 99233 SBSQ HOSP IP/OBS HIGH 50: CPT | Performed by: HOSPITALIST

## 2018-11-27 PROCEDURE — 83735 ASSAY OF MAGNESIUM: CPT

## 2018-11-27 PROCEDURE — 80061 LIPID PANEL: CPT

## 2018-11-27 PROCEDURE — 80053 COMPREHEN METABOLIC PANEL: CPT

## 2018-11-27 RX ORDER — POTASSIUM CHLORIDE 20 MEQ/1
40 TABLET, EXTENDED RELEASE ORAL ONCE
Status: COMPLETED | OUTPATIENT
Start: 2018-11-27 | End: 2018-11-27

## 2018-11-27 RX ORDER — MAGNESIUM SULFATE HEPTAHYDRATE 40 MG/ML
4 INJECTION, SOLUTION INTRAVENOUS ONCE
Status: COMPLETED | OUTPATIENT
Start: 2018-11-27 | End: 2018-11-27

## 2018-11-27 RX ORDER — LORAZEPAM 1 MG/1
.5-1 TABLET ORAL SEE ADMIN INSTRUCTIONS
Status: ON HOLD | COMMUNITY
End: 2018-11-29

## 2018-11-27 RX ORDER — BACITRACIN ZINC 500 [USP'U]/G
OINTMENT TOPICAL 2 TIMES DAILY
Status: DISCONTINUED | OUTPATIENT
Start: 2018-11-27 | End: 2018-11-29 | Stop reason: HOSPADM

## 2018-11-27 RX ADMIN — LORAZEPAM 0.5 MG: 1 TABLET ORAL at 22:13

## 2018-11-27 RX ADMIN — CEFAZOLIN SODIUM 2 G: 2 INJECTION, SOLUTION INTRAVENOUS at 13:54

## 2018-11-27 RX ADMIN — FOLIC ACID 1 MG: 1 TABLET ORAL at 05:08

## 2018-11-27 RX ADMIN — CEFAZOLIN SODIUM 2 G: 2 INJECTION, SOLUTION INTRAVENOUS at 22:04

## 2018-11-27 RX ADMIN — THIAMINE HCL TAB 100 MG 100 MG: 100 TAB at 05:08

## 2018-11-27 RX ADMIN — OXYCODONE HYDROCHLORIDE 5 MG: 5 TABLET ORAL at 12:09

## 2018-11-27 RX ADMIN — CEFAZOLIN SODIUM 2 G: 2 INJECTION, SOLUTION INTRAVENOUS at 05:10

## 2018-11-27 RX ADMIN — SODIUM CHLORIDE: 4.5 INJECTION, SOLUTION INTRAVENOUS at 02:55

## 2018-11-27 RX ADMIN — THERA TABS 1 TABLET: TAB at 05:09

## 2018-11-27 RX ADMIN — CALCIUM CHLORIDE 1 G: 100 INJECTION, SOLUTION INTRAVENOUS at 00:44

## 2018-11-27 RX ADMIN — OXYCODONE HYDROCHLORIDE 10 MG: 10 TABLET ORAL at 13:40

## 2018-11-27 RX ADMIN — POTASSIUM CHLORIDE 40 MEQ: 1500 TABLET, EXTENDED RELEASE ORAL at 12:02

## 2018-11-27 RX ADMIN — BACITRACIN ZINC: 500 OINTMENT TOPICAL at 19:00

## 2018-11-27 RX ADMIN — SODIUM CHLORIDE: 4.5 INJECTION, SOLUTION INTRAVENOUS at 12:02

## 2018-11-27 RX ADMIN — MAGNESIUM SULFATE IN WATER 4 G: 40 INJECTION, SOLUTION INTRAVENOUS at 12:02

## 2018-11-27 RX ADMIN — OXYCODONE HYDROCHLORIDE 10 MG: 10 TABLET ORAL at 20:14

## 2018-11-27 ASSESSMENT — LIFESTYLE VARIABLES
AGITATION: SOMEWHAT MORE THAN NORMAL ACTIVITY
TOTAL SCORE: 1
TOTAL SCORE: 1
HEADACHE, FULLNESS IN HEAD: VERY MILD
TREMOR: NO TREMOR
ON A TYPICAL DAY WHEN YOU DRINK ALCOHOL HOW MANY DRINKS DO YOU HAVE: 12
VISUAL DISTURBANCES: NOT PRESENT
ALCOHOL_USE: YES
TREMOR: NO TREMOR
AGITATION: NORMAL ACTIVITY
PAROXYSMAL SWEATS: NO SWEAT VISIBLE
PAROXYSMAL SWEATS: NO SWEAT VISIBLE
HAVE YOU EVER FELT YOU SHOULD CUT DOWN ON YOUR DRINKING: YES
ORIENTATION AND CLOUDING OF SENSORIUM: ORIENTED AND CAN DO SERIAL ADDITIONS
ORIENTATION AND CLOUDING OF SENSORIUM: ORIENTED AND CAN DO SERIAL ADDITIONS
VISUAL DISTURBANCES: NOT PRESENT
TOTAL SCORE: 1
TOTAL SCORE: 1
PAROXYSMAL SWEATS: NO SWEAT VISIBLE
AUDITORY DISTURBANCES: NOT PRESENT
PAROXYSMAL SWEATS: NO SWEAT VISIBLE
ORIENTATION AND CLOUDING OF SENSORIUM: CANNOT DO SERIAL ADDITIONS OR IS UNCERTAIN ABOUT DATE
ANXIETY: NO ANXIETY (AT EASE)
HEADACHE, FULLNESS IN HEAD: VERY MILD
NAUSEA AND VOMITING: NO NAUSEA AND NO VOMITING
ANXIETY: MILDLY ANXIOUS
TOTAL SCORE: 1
AVERAGE NUMBER OF DAYS PER WEEK YOU HAVE A DRINK CONTAINING ALCOHOL: 7
AUDITORY DISTURBANCES: NOT PRESENT
VISUAL DISTURBANCES: NOT PRESENT
TREMOR: NO TREMOR
AUDITORY DISTURBANCES: NOT PRESENT
ANXIETY: NO ANXIETY (AT EASE)
NAUSEA AND VOMITING: NO NAUSEA AND NO VOMITING
VISUAL DISTURBANCES: NOT PRESENT
TREMOR: NO TREMOR
NAUSEA AND VOMITING: NO NAUSEA AND NO VOMITING
HEADACHE, FULLNESS IN HEAD: NOT PRESENT
VISUAL DISTURBANCES: NOT PRESENT
AGITATION: SOMEWHAT MORE THAN NORMAL ACTIVITY
TREMOR: NO TREMOR
AUDITORY DISTURBANCES: NOT PRESENT
ORIENTATION AND CLOUDING OF SENSORIUM: ORIENTED AND CAN DO SERIAL ADDITIONS
TOTAL SCORE: 5
ORIENTATION AND CLOUDING OF SENSORIUM: ORIENTED AND CAN DO SERIAL ADDITIONS
HEADACHE, FULLNESS IN HEAD: NOT PRESENT
NAUSEA AND VOMITING: NO NAUSEA AND NO VOMITING
AGITATION: NORMAL ACTIVITY
HEADACHE, FULLNESS IN HEAD: NOT PRESENT
TOTAL SCORE: MILD ITCHING, PINS AND NEEDLES SENSATION, BURNING OR NUMBNESS
NAUSEA AND VOMITING: NO NAUSEA AND NO VOMITING
AGITATION: NORMAL ACTIVITY
HEADACHE, FULLNESS IN HEAD: VERY MILD
ORIENTATION AND CLOUDING OF SENSORIUM: ORIENTED AND CAN DO SERIAL ADDITIONS
EVER HAD A DRINK FIRST THING IN THE MORNING TO STEADY YOUR NERVES TO GET RID OF A HANGOVER: YES
AUDITORY DISTURBANCES: NOT PRESENT
HEADACHE, FULLNESS IN HEAD: VERY MILD
NAUSEA AND VOMITING: NO NAUSEA AND NO VOMITING
VISUAL DISTURBANCES: NOT PRESENT
AGITATION: NORMAL ACTIVITY
ANXIETY: NO ANXIETY (AT EASE)
HAVE PEOPLE ANNOYED YOU BY CRITICIZING YOUR DRINKING: YES
TOTAL SCORE: 4
DOES PATIENT WANT TO STOP DRINKING: YES
EVER FELT BAD OR GUILTY ABOUT YOUR DRINKING: YES
TOTAL SCORE: 4
TOTAL SCORE: 1
VISUAL DISTURBANCES: NOT PRESENT
TOTAL SCORE: 4
AUDITORY DISTURBANCES: NOT PRESENT
ANXIETY: NO ANXIETY (AT EASE)
DOES PATIENT WANT TO TALK TO SOMEONE ABOUT QUITTING: YES
TREMOR: NO TREMOR
ANXIETY: NO ANXIETY (AT EASE)
ORIENTATION AND CLOUDING OF SENSORIUM: ORIENTED AND CAN DO SERIAL ADDITIONS
TOTAL SCORE: 1
AGITATION: NORMAL ACTIVITY
HEADACHE, FULLNESS IN HEAD: VERY MILD
ANXIETY: NO ANXIETY (AT EASE)
AUDITORY DISTURBANCES: NOT PRESENT
EVER_SMOKED: YES
CONSUMPTION TOTAL: POSITIVE
NAUSEA AND VOMITING: NO NAUSEA AND NO VOMITING
PAROXYSMAL SWEATS: NO SWEAT VISIBLE
PAROXYSMAL SWEATS: NO SWEAT VISIBLE
TREMOR: NO TREMOR
AGITATION: NORMAL ACTIVITY
AUDITORY DISTURBANCES: NOT PRESENT
ORIENTATION AND CLOUDING OF SENSORIUM: CANNOT DO SERIAL ADDITIONS OR IS UNCERTAIN ABOUT DATE
HOW MANY TIMES IN THE PAST YEAR HAVE YOU HAD 5 OR MORE DRINKS IN A DAY: 200
NAUSEA AND VOMITING: NO NAUSEA AND NO VOMITING
ANXIETY: NO ANXIETY (AT EASE)
PAROXYSMAL SWEATS: NO SWEAT VISIBLE
PAROXYSMAL SWEATS: NO SWEAT VISIBLE
VISUAL DISTURBANCES: NOT PRESENT
TREMOR: NO TREMOR

## 2018-11-27 ASSESSMENT — PAIN SCALES - GENERAL
PAINLEVEL_OUTOF10: 2
PAINLEVEL_OUTOF10: 0
PAINLEVEL_OUTOF10: 6
PAINLEVEL_OUTOF10: 5
PAINLEVEL_OUTOF10: 7
PAINLEVEL_OUTOF10: 2
PAINLEVEL_OUTOF10: 5
PAINLEVEL_OUTOF10: 0
PAINLEVEL_OUTOF10: 2

## 2018-11-27 ASSESSMENT — COGNITIVE AND FUNCTIONAL STATUS - GENERAL
MOBILITY SCORE: 16
PERSONAL GROOMING: A LITTLE
DRESSING REGULAR LOWER BODY CLOTHING: A LITTLE
TOILETING: A LITTLE
TURNING FROM BACK TO SIDE WHILE IN FLAT BAD: A LITTLE
SUGGESTED CMS G CODE MODIFIER MOBILITY: CK
MOVING FROM LYING ON BACK TO SITTING ON SIDE OF FLAT BED: A LITTLE
DRESSING REGULAR UPPER BODY CLOTHING: A LITTLE
STANDING UP FROM CHAIR USING ARMS: A LITTLE
WALKING IN HOSPITAL ROOM: A LOT
CLIMB 3 TO 5 STEPS WITH RAILING: A LOT
HELP NEEDED FOR BATHING: A LITTLE
EATING MEALS: A LITTLE
SUGGESTED CMS G CODE MODIFIER DAILY ACTIVITY: CK
MOVING TO AND FROM BED TO CHAIR: A LITTLE
DAILY ACTIVITIY SCORE: 18

## 2018-11-27 ASSESSMENT — ENCOUNTER SYMPTOMS
NAUSEA: 0
BLURRED VISION: 0
FEVER: 0
DIZZINESS: 0
SORE THROAT: 0
FALLS: 0
SPUTUM PRODUCTION: 0
ABDOMINAL PAIN: 0
PALPITATIONS: 0
SHORTNESS OF BREATH: 0
LOSS OF CONSCIOUSNESS: 0
CHILLS: 0
DOUBLE VISION: 0
BACK PAIN: 0
HEADACHES: 1
COUGH: 0
HEARTBURN: 0
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
## 2018-11-27 NOTE — ED NOTES
"Unable to complete med rec. Per pt, he was prescribed unknown medication for \"withdrawals.\"  Med rec tech will attempt to follow up tomorrow with pharmacy.   "

## 2018-11-27 NOTE — PROGRESS NOTES
Received report from TANA Varner. Assumed pt care. Pain level 0/10. AOX4. POC discussed. Tele monitor in place. Call light within reach, bed in lowest position, and personal items accessible.
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
## 2018-11-27 NOTE — CARE PLAN
Problem: Communication  Goal: The ability to communicate needs accurately and effectively will improve  Pt communicating needs appropriately. Oriented to the call light and to call for assistance.       Problem: Pain Management  Goal: Pain level will decrease to patient's comfort goal  Q4 pain assessments in place. Pt educated on pain scale. RN aware of pt's goal pain. PRN medication orders followed.

## 2018-11-27 NOTE — PROGRESS NOTES
Received bedside report from ED RN. Pt is A&Ox4 and in a stable condition. Pt on tele box and monitor room notified. SR 98

## 2018-11-27 NOTE — CARE PLAN
Problem: Safety  Goal: Will remain free from injury  Outcome: PROGRESSING AS EXPECTED  Patient educated about risk of falls and reasoning for use of tread socks, calling for help, and bed alarms.       Problem: Knowledge Deficit  Goal: Knowledge of disease process/condition, treatment plan, diagnostic tests, and medications will improve  Outcome: PROGRESSING AS EXPECTED  Patient educated regarding medications, procedures and plan of care.

## 2018-11-27 NOTE — ASSESSMENT & PLAN NOTE
Large left sided head laceration   Dr. Gardner plastics has been consulted, irrgation done at bedside and repair  Cont topical bacitracin
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
## 2018-11-27 NOTE — PROGRESS NOTES
2 RN skin note with Todd RN  Head laceration on left side of scalp/forehead. Bandages in place unable to assess. Face swollen and red   Bilateral scrapes on hands  Bilateral scrapes on knees and throughout lower extremities.   Bilateral scrapes throughout upper extremities   All other skin intact and blanching
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
## 2018-11-27 NOTE — ED NOTES
Med Rec complete per Pt at bedside and Trisha pharamacy  Allergies Reviewed  No ABX in the last 30 days    Pt started a tapering dose of ATIVAN on 11/19.

## 2018-11-27 NOTE — PROGRESS NOTES
Transfer of care from TANA Varner to TANA Marshall. Report received. All questions and concerns addressed with the pt.

## 2018-11-27 NOTE — ED NOTES
Patient to room 16 via stretcher, assumed care at this time. Speech slurred, patient appears intoxicated. Oriented x4. On cardiac and o2 monitors.

## 2018-11-27 NOTE — ASSESSMENT & PLAN NOTE
CT head outside hospital with no acute abnormalities   Status post suture and staples in ER  Sutures to remain in place for 7-10 days

## 2018-11-28 PROBLEM — R50.9 FEVER: Status: ACTIVE | Noted: 2018-11-28

## 2018-11-28 LAB
ANION GAP SERPL CALC-SCNC: 9 MMOL/L (ref 0–11.9)
APPEARANCE UR: CLEAR
BACTERIA #/AREA URNS HPF: NEGATIVE /HPF
BASOPHILS # BLD AUTO: 0.4 % (ref 0–1.8)
BASOPHILS # BLD: 0.04 K/UL (ref 0–0.12)
BUN SERPL-MCNC: 3 MG/DL (ref 8–22)
CALCIUM SERPL-MCNC: 9.1 MG/DL (ref 8.5–10.5)
CHLORIDE SERPL-SCNC: 100 MMOL/L (ref 96–112)
CO2 SERPL-SCNC: 27 MMOL/L (ref 20–33)
COLOR UR: NORMAL
CREAT SERPL-MCNC: 0.49 MG/DL (ref 0.5–1.4)
EOSINOPHIL # BLD AUTO: 0.04 K/UL (ref 0–0.51)
EOSINOPHIL NFR BLD: 0.4 % (ref 0–6.9)
EPI CELLS #/AREA URNS HPF: NEGATIVE /HPF
ERYTHROCYTE [DISTWIDTH] IN BLOOD BY AUTOMATED COUNT: 48 FL (ref 35.9–50)
GLUCOSE SERPL-MCNC: 100 MG/DL (ref 65–99)
HCT VFR BLD AUTO: 36.8 % (ref 42–52)
HGB BLD-MCNC: 12.9 G/DL (ref 14–18)
HYALINE CASTS #/AREA URNS LPF: ABNORMAL /LPF
IMM GRANULOCYTES # BLD AUTO: 0.04 K/UL (ref 0–0.11)
IMM GRANULOCYTES NFR BLD AUTO: 0.4 % (ref 0–0.9)
LYMPHOCYTES # BLD AUTO: 1.27 K/UL (ref 1–4.8)
LYMPHOCYTES NFR BLD: 13.8 % (ref 22–41)
MAGNESIUM SERPL-MCNC: 1.9 MG/DL (ref 1.5–2.5)
MCH RBC QN AUTO: 33.2 PG (ref 27–33)
MCHC RBC AUTO-ENTMCNC: 35.1 G/DL (ref 33.7–35.3)
MCV RBC AUTO: 94.8 FL (ref 81.4–97.8)
MICRO URNS: NORMAL
MONOCYTES # BLD AUTO: 0.41 K/UL (ref 0–0.85)
MONOCYTES NFR BLD AUTO: 4.4 % (ref 0–13.4)
NEUTROPHILS # BLD AUTO: 7.43 K/UL (ref 1.82–7.42)
NEUTROPHILS NFR BLD: 80.6 % (ref 44–72)
NRBC # BLD AUTO: 0 K/UL
NRBC BLD-RTO: 0 /100 WBC
PHOSPHATE SERPL-MCNC: 2.6 MG/DL (ref 2.5–4.5)
PLATELET # BLD AUTO: 206 K/UL (ref 164–446)
PMV BLD AUTO: 10.4 FL (ref 9–12.9)
POTASSIUM SERPL-SCNC: 3.6 MMOL/L (ref 3.6–5.5)
RBC # BLD AUTO: 3.88 M/UL (ref 4.7–6.1)
RBC # URNS HPF: ABNORMAL /HPF
SODIUM SERPL-SCNC: 136 MMOL/L (ref 135–145)
SP GR UR STRIP.AUTO: 1.02
WBC # BLD AUTO: 9.2 K/UL (ref 4.8–10.8)
WBC #/AREA URNS HPF: ABNORMAL /HPF

## 2018-11-28 PROCEDURE — 97165 OT EVAL LOW COMPLEX 30 MIN: CPT

## 2018-11-28 PROCEDURE — G8979 MOBILITY GOAL STATUS: HCPCS | Mod: CI

## 2018-11-28 PROCEDURE — G8980 MOBILITY D/C STATUS: HCPCS | Mod: CI

## 2018-11-28 PROCEDURE — 700111 HCHG RX REV CODE 636 W/ 250 OVERRIDE (IP): Performed by: HOSPITALIST

## 2018-11-28 PROCEDURE — 80048 BASIC METABOLIC PNL TOTAL CA: CPT

## 2018-11-28 PROCEDURE — 81001 URINALYSIS AUTO W/SCOPE: CPT

## 2018-11-28 PROCEDURE — 700102 HCHG RX REV CODE 250 W/ 637 OVERRIDE(OP): Performed by: HOSPITALIST

## 2018-11-28 PROCEDURE — 83735 ASSAY OF MAGNESIUM: CPT

## 2018-11-28 PROCEDURE — G8987 SELF CARE CURRENT STATUS: HCPCS | Mod: CI

## 2018-11-28 PROCEDURE — 97161 PT EVAL LOW COMPLEX 20 MIN: CPT

## 2018-11-28 PROCEDURE — 99232 SBSQ HOSP IP/OBS MODERATE 35: CPT | Performed by: HOSPITALIST

## 2018-11-28 PROCEDURE — G8978 MOBILITY CURRENT STATUS: HCPCS | Mod: CI

## 2018-11-28 PROCEDURE — 770020 HCHG ROOM/CARE - TELE (206)

## 2018-11-28 PROCEDURE — 36415 COLL VENOUS BLD VENIPUNCTURE: CPT

## 2018-11-28 PROCEDURE — A9270 NON-COVERED ITEM OR SERVICE: HCPCS | Performed by: HOSPITALIST

## 2018-11-28 PROCEDURE — 84100 ASSAY OF PHOSPHORUS: CPT

## 2018-11-28 PROCEDURE — G8988 SELF CARE GOAL STATUS: HCPCS | Mod: CI

## 2018-11-28 PROCEDURE — 85025 COMPLETE CBC W/AUTO DIFF WBC: CPT

## 2018-11-28 RX ORDER — ACETAMINOPHEN 500 MG
500 TABLET ORAL EVERY 6 HOURS PRN
Status: DISCONTINUED | OUTPATIENT
Start: 2018-11-28 | End: 2018-11-29 | Stop reason: HOSPADM

## 2018-11-28 RX ADMIN — STANDARDIZED SENNA CONCENTRATE AND DOCUSATE SODIUM 2 TABLET: 8.6; 5 TABLET, FILM COATED ORAL at 16:28

## 2018-11-28 RX ADMIN — OXYCODONE HYDROCHLORIDE 10 MG: 10 TABLET ORAL at 13:21

## 2018-11-28 RX ADMIN — FOLIC ACID 1 MG: 1 TABLET ORAL at 05:11

## 2018-11-28 RX ADMIN — THIAMINE HCL TAB 100 MG 100 MG: 100 TAB at 05:11

## 2018-11-28 RX ADMIN — OXYCODONE HYDROCHLORIDE 10 MG: 10 TABLET ORAL at 22:31

## 2018-11-28 RX ADMIN — OXYCODONE HYDROCHLORIDE 10 MG: 10 TABLET ORAL at 08:11

## 2018-11-28 RX ADMIN — OXYCODONE HYDROCHLORIDE 10 MG: 10 TABLET ORAL at 19:38

## 2018-11-28 RX ADMIN — CEFAZOLIN SODIUM 2 G: 2 INJECTION, SOLUTION INTRAVENOUS at 05:12

## 2018-11-28 RX ADMIN — BACITRACIN ZINC: 500 OINTMENT TOPICAL at 05:12

## 2018-11-28 RX ADMIN — OXYCODONE HYDROCHLORIDE 10 MG: 10 TABLET ORAL at 16:28

## 2018-11-28 RX ADMIN — BACITRACIN ZINC: 500 OINTMENT TOPICAL at 16:28

## 2018-11-28 RX ADMIN — THERA TABS 1 TABLET: TAB at 05:11

## 2018-11-28 ASSESSMENT — COGNITIVE AND FUNCTIONAL STATUS - GENERAL
DRESSING REGULAR LOWER BODY CLOTHING: A LITTLE
HELP NEEDED FOR BATHING: A LITTLE
SUGGESTED CMS G CODE MODIFIER MOBILITY: CH
MOBILITY SCORE: 24
DAILY ACTIVITIY SCORE: 22
SUGGESTED CMS G CODE MODIFIER DAILY ACTIVITY: CJ

## 2018-11-28 ASSESSMENT — LIFESTYLE VARIABLES
ORIENTATION AND CLOUDING OF SENSORIUM: ORIENTED AND CAN DO SERIAL ADDITIONS
ANXIETY: NO ANXIETY (AT EASE)
ANXIETY: NO ANXIETY (AT EASE)
AUDITORY DISTURBANCES: NOT PRESENT
PAROXYSMAL SWEATS: NO SWEAT VISIBLE
HEADACHE, FULLNESS IN HEAD: NOT PRESENT
AUDITORY DISTURBANCES: NOT PRESENT
PAROXYSMAL SWEATS: NO SWEAT VISIBLE
ORIENTATION AND CLOUDING OF SENSORIUM: ORIENTED AND CAN DO SERIAL ADDITIONS
VISUAL DISTURBANCES: NOT PRESENT
AGITATION: NORMAL ACTIVITY
NAUSEA AND VOMITING: NO NAUSEA AND NO VOMITING
TREMOR: NO TREMOR
TREMOR: NO TREMOR
HEADACHE, FULLNESS IN HEAD: NOT PRESENT
VISUAL DISTURBANCES: NOT PRESENT
HEADACHE, FULLNESS IN HEAD: NOT PRESENT
NAUSEA AND VOMITING: NO NAUSEA AND NO VOMITING
ORIENTATION AND CLOUDING OF SENSORIUM: ORIENTED AND CAN DO SERIAL ADDITIONS
NAUSEA AND VOMITING: NO NAUSEA AND NO VOMITING
VISUAL DISTURBANCES: NOT PRESENT
TOTAL SCORE: 2
TOTAL SCORE: 0
ANXIETY: NO ANXIETY (AT EASE)
TREMOR: NO TREMOR
AUDITORY DISTURBANCES: NOT PRESENT
TOTAL SCORE: 0
AGITATION: NORMAL ACTIVITY
PAROXYSMAL SWEATS: *
AGITATION: NORMAL ACTIVITY

## 2018-11-28 ASSESSMENT — ENCOUNTER SYMPTOMS
SPUTUM PRODUCTION: 0
DOUBLE VISION: 0
DIZZINESS: 0
SORE THROAT: 0
HEADACHES: 0
BACK PAIN: 0
PALPITATIONS: 0
CHILLS: 0
LOSS OF CONSCIOUSNESS: 0
NAUSEA: 0
FEVER: 0
SHORTNESS OF BREATH: 0
BLURRED VISION: 0
FALLS: 0
ABDOMINAL PAIN: 0
COUGH: 0
HEARTBURN: 0

## 2018-11-28 ASSESSMENT — PAIN SCALES - GENERAL
PAINLEVEL_OUTOF10: 3
PAINLEVEL_OUTOF10: 7
PAINLEVEL_OUTOF10: 3
PAINLEVEL_OUTOF10: 7
PAINLEVEL_OUTOF10: 7

## 2018-11-28 ASSESSMENT — PATIENT HEALTH QUESTIONNAIRE - PHQ9
SUM OF ALL RESPONSES TO PHQ9 QUESTIONS 1 AND 2: 0
2. FEELING DOWN, DEPRESSED, IRRITABLE, OR HOPELESS: NOT AT ALL
1. LITTLE INTEREST OR PLEASURE IN DOING THINGS: NOT AT ALL
SUM OF ALL RESPONSES TO PHQ9 QUESTIONS 1 AND 2: 0
1. LITTLE INTEREST OR PLEASURE IN DOING THINGS: NOT AT ALL

## 2018-11-28 ASSESSMENT — GAIT ASSESSMENTS
DEVIATION: OTHER (COMMENT)
GAIT LEVEL OF ASSIST: SUPERVISED
DISTANCE (FEET): 300

## 2018-11-28 ASSESSMENT — ACTIVITIES OF DAILY LIVING (ADL): TOILETING: INDEPENDENT

## 2018-11-28 NOTE — PROGRESS NOTES
"Per paper records from [facility] located in pt chart, full body CT showed \"no acute abnormality\" in the cervical and thoracic spine. Per Dr Wiley, new orders for cervical collar to be removed.  "
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
## 2018-11-28 NOTE — PROGRESS NOTES
Received bedside report from night shift RN. Patient helped up out of bed with SBA to bathroom and helped back to bed. Strip alarm in place. POC discussed, patient has no complaints at this time. Tele in place, call light within reach. Needs met, assumed care at this time.

## 2018-11-28 NOTE — CARE PLAN
Problem: Communication  Goal: The ability to communicate needs accurately and effectively will improve  Outcome: PROGRESSING AS EXPECTED  Pt communicates needs effectively and calls for assistance appropriately.    Problem: Safety  Goal: Will remain free from falls  Outcome: PROGRESSING AS EXPECTED  Pt verbalizes understanding of fall precautions and calls for assistance appropriately. Bed in lowest position and locked. Bed alarm in use. Pt wearing non-slip socks, call light within reach.

## 2018-11-28 NOTE — PROGRESS NOTES
PLASTICS    Patient s/p left temple/forehead/scalp laceration repair.  Minimal drainage from penrose drain - removed.  Repair intact, no significant fluid collections.  Continue antibiotic ointment to suture line bid.  Sutures/staples to remain in 7-10 days.

## 2018-11-28 NOTE — THERAPY
"Occupational Therapy Evaluation completed.   Functional Status:  SPV BADLs in this setting, SBA functional mobility w/ no AD  Plan of Care: Will remain available for DC needs only  Discharge Recommendations:  Equipment: No Equipment Needed. Post-acute therapy Anticipate that the patient will have no further occupational therapy needs after discharge from the hospital.     See \"Rehab Therapy-Acute\" Patient Summary Report for complete documentation.    Pt is a 37 y/o male who presents to acute 2/2 head laceration and alcohol withdrawal. Pt reports he lives in a 1 story house with his mother and doesn't work. Pt appears close to functional baseline. Will remain available for DC needs only.     "
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
## 2018-11-28 NOTE — WOUND TEAM
Wound consult received, chart reviewed, patient has suture line care ordered per MAR. Dr. Gardner ordered bacitracin ointment to suture line of head BID. Advised staff RN Judie to call Dr. Gardner regarding any specific dressing instructions for covering surgical site, between applications of bacitracin. Nursing will follow up with surgeon.
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
## 2018-11-28 NOTE — CARE PLAN
Problem: Safety  Goal: Will remain free from injury  Hourly rounding done. Room close to nurses station. Call light within reach, bed locked low. Strip alarm in place, patient agrees to use call light for assist when OOB.      Problem: Medication  Goal: Compliance with prescribed medication will improve  Patient educated on any new meds.      Problem: Pain Management  Goal: Pain level will decrease to patient's comfort goal  Pain assessed per protocol. Patient agrees to report any pain, PRN pain meds available.     Problem: Mobility  Goal: Risk for activity intolerance will decrease  OOB with SBA. Strip alarm in place. Patient agrees to use call light for help when OOB.

## 2018-11-29 ENCOUNTER — PATIENT OUTREACH (OUTPATIENT)
Dept: HEALTH INFORMATION MANAGEMENT | Facility: OTHER | Age: 38
End: 2018-11-29

## 2018-11-29 VITALS
HEIGHT: 68 IN | HEART RATE: 100 BPM | SYSTOLIC BLOOD PRESSURE: 120 MMHG | DIASTOLIC BLOOD PRESSURE: 74 MMHG | WEIGHT: 191.36 LBS | BODY MASS INDEX: 29 KG/M2 | TEMPERATURE: 99.5 F | OXYGEN SATURATION: 97 % | RESPIRATION RATE: 15 BRPM

## 2018-11-29 PROBLEM — E83.42 HYPOMAGNESEMIA: Status: RESOLVED | Noted: 2018-11-27 | Resolved: 2018-11-29

## 2018-11-29 PROBLEM — R50.9 FEVER: Status: RESOLVED | Noted: 2018-11-28 | Resolved: 2018-11-29

## 2018-11-29 PROBLEM — E87.0 HYPERNATREMIA: Status: RESOLVED | Noted: 2018-11-27 | Resolved: 2018-11-29

## 2018-11-29 PROBLEM — E87.6 HYPOKALEMIA: Status: RESOLVED | Noted: 2018-11-27 | Resolved: 2018-11-29

## 2018-11-29 PROBLEM — E83.51 HYPOCALCEMIA: Status: RESOLVED | Noted: 2018-11-27 | Resolved: 2018-11-29

## 2018-11-29 PROBLEM — F10.939 ALCOHOL WITHDRAWAL (HCC): Status: RESOLVED | Noted: 2018-11-27 | Resolved: 2018-11-29

## 2018-11-29 PROBLEM — F10.929 ALCOHOL INTOXICATION (HCC): Status: RESOLVED | Noted: 2018-11-27 | Resolved: 2018-11-29

## 2018-11-29 LAB
MAGNESIUM SERPL-MCNC: 1.9 MG/DL (ref 1.5–2.5)
PHOSPHATE SERPL-MCNC: 3.5 MG/DL (ref 2.5–4.5)

## 2018-11-29 PROCEDURE — 36415 COLL VENOUS BLD VENIPUNCTURE: CPT

## 2018-11-29 PROCEDURE — 700102 HCHG RX REV CODE 250 W/ 637 OVERRIDE(OP): Performed by: HOSPITALIST

## 2018-11-29 PROCEDURE — 84100 ASSAY OF PHOSPHORUS: CPT

## 2018-11-29 PROCEDURE — 83735 ASSAY OF MAGNESIUM: CPT

## 2018-11-29 PROCEDURE — 99239 HOSP IP/OBS DSCHRG MGMT >30: CPT | Performed by: HOSPITALIST

## 2018-11-29 PROCEDURE — A9270 NON-COVERED ITEM OR SERVICE: HCPCS | Performed by: HOSPITALIST

## 2018-11-29 RX ORDER — BACITRACIN ZINC 500 [USP'U]/G
OINTMENT TOPICAL
Qty: 1 TUBE | Refills: 0 | Status: SHIPPED | OUTPATIENT
Start: 2018-11-29

## 2018-11-29 RX ORDER — OXYCODONE HYDROCHLORIDE 10 MG/1
10 TABLET ORAL EVERY 6 HOURS PRN
Qty: 8 TAB | Refills: 0 | Status: SHIPPED | OUTPATIENT
Start: 2018-11-29 | End: 2018-12-01

## 2018-11-29 RX ORDER — FOLIC ACID 1 MG/1
1 TABLET ORAL DAILY
Qty: 30 TAB | Refills: 0 | Status: SHIPPED | OUTPATIENT
Start: 2018-11-30

## 2018-11-29 RX ORDER — LANOLIN ALCOHOL/MO/W.PET/CERES
100 CREAM (GRAM) TOPICAL DAILY
Qty: 30 TAB | Refills: 0 | Status: SHIPPED | OUTPATIENT
Start: 2018-11-30

## 2018-11-29 RX ADMIN — FOLIC ACID 1 MG: 1 TABLET ORAL at 05:08

## 2018-11-29 RX ADMIN — THIAMINE HCL TAB 100 MG 100 MG: 100 TAB at 05:08

## 2018-11-29 RX ADMIN — STANDARDIZED SENNA CONCENTRATE AND DOCUSATE SODIUM 2 TABLET: 8.6; 5 TABLET, FILM COATED ORAL at 05:08

## 2018-11-29 RX ADMIN — OXYCODONE HYDROCHLORIDE 10 MG: 10 TABLET ORAL at 02:06

## 2018-11-29 RX ADMIN — BACITRACIN ZINC: 500 OINTMENT TOPICAL at 16:01

## 2018-11-29 RX ADMIN — OXYCODONE HYDROCHLORIDE 10 MG: 10 TABLET ORAL at 05:08

## 2018-11-29 RX ADMIN — THERA TABS 1 TABLET: TAB at 05:08

## 2018-11-29 RX ADMIN — BACITRACIN ZINC: 500 OINTMENT TOPICAL at 05:09

## 2018-11-29 ASSESSMENT — PAIN SCALES - GENERAL: PAINLEVEL_OUTOF10: 4

## 2018-11-29 NOTE — DISCHARGE INSTRUCTIONS
Discharge Instructions    Discharged to home by car with relative. Discharged via walking, hospital escort: Refused.  Special equipment needed: Not Applicable    Be sure to schedule a follow-up appointment with your primary care doctor or any specialists as instructed.     Discharge Plan:   Diet Plan: Discussed  Activity Level: Discussed  Smoking Cessation Offered: Patient Refused  Confirmed Follow up Appointment: Appointment Scheduled  Confirmed Symptoms Management: Discussed  Medication Reconciliation Updated: Yes  Influenza Vaccine Indication: Not indicated: Previously immunized this influenza season and > 8 years of age (Pt states they have had flue shot this season)    I understand that a diet low in cholesterol, fat, and sodium is recommended for good health. Unless I have been given specific instructions below for another diet, I accept this instruction as my diet prescription.   Other diet:     Special Instructions: None    · Is patient discharged on Warfarin / Coumadin?   No       Head Injury, Adult  There are many types of head injuries. They can be as minor as a bump. Some head injuries can be worse. Worse injuries include:  · A strong hit to the head that hurts the brain (concussion).  · A bruise of the brain (contusion). This means there is bleeding in the brain that can cause swelling.  · A cracked skull (skull fracture).  · Bleeding in the brain that gathers, gets thick (makes a clot), and forms a bump (hematoma).  Most problems from a head injury come in the first 24 hours. However, you may still have side effects up to 7-10 days after your injury. It is important to watch your condition for any changes.  Follow these instructions at home:  Activity  · Rest as much as possible.  · Avoid activities that are hard or tiring.  · Make sure you get enough sleep.  · Limit activities that need a lot of thought or attention, such as:  ¨ Watching TV.  ¨ Playing memory games and puzzles.  ¨ Job-related work or  homework.  ¨ Working on the computer, social media, and texting.  · Avoid activities that could cause another head injury until your doctor says it is okay. This includes playing sports.  · Ask your doctor when it is safe for you to go back to your normal activities, such as work or school. Ask your doctor for a step-by-step plan for slowly going back to your normal activities.  · Ask your doctor when you can drive, ride a bicycle, or use heavy machinery. Never do these activities if you are dizzy.  Lifestyle  · Do not drink alcohol until your doctor says it is okay.  · Avoid drug use.  · If it is harder than usual to remember things, write them down.  · If you are easily distracted, try to do one thing at a time.  · Talk with family members or close friends when making important decisions.  · Tell your friends, family, a trusted coworker, and  about your injury, symptoms, and limits (restrictions). Have them watch for any problems that are new or getting worse.  General instructions  · Take over-the-counter and prescription medicines only as told by your doctor.  · Have someone stay with you for 24 hours after your head injury. This person should watch you for any changes in your symptoms and be ready to get help.  · Keep all follow-up visits as told by your doctor. This is important.  Prevention  · Work on your balance and strength. This can help you avoid falls.  · Wear a seatbelt when you are in a moving vehicle.  · Wear a helmet when:  ¨ Riding a bicycle.  ¨ Skiing.  ¨ Doing any other sport or activity that has a risk of injury.  · Drink alcohol only in moderation.  · Make your home safer by:  ¨ Getting rid of clutter from the floors and stairs, like things that can make you trip.  ¨ Using grab bars in bathrooms and handrails by stairs.  ¨ Placing non-slip mats on floors and in bathtubs.  ¨ Putting more light in dim areas.  Get help right away if:  · You have:  ¨ A very bad (severe) headache that is  not helped by medicine.  ¨ Trouble walking or weakness in your arms and legs.  ¨ Clear or bloody fluid coming from your nose or ears.  ¨ Changes in your seeing (vision).  ¨ Jerky movements that you cannot control (seizure).  · You throw up (vomit).  · Your symptoms get worse.  · You lose balance.  · Your speech is slurred.  · You pass out.  · You are sleepier and have trouble staying awake.  · The black centers of your eyes (pupils) change in size.  These symptoms may be an emergency. Do not wait to see if the symptoms will go away. Get medical help right away. Call your local emergency services (911 in the [place] Do not drive yourself to the hospital.   This information is not intended to replace advice given to you by your health care provider. Make sure you discuss any questions you have with your health care provider.  Document Released: 11/30/2009 Document Revised: 07/13/2017 Document Reviewed: 06/27/2017  [email or web address] Interactive Patient Education © 2017 [email or web address]      Alcohol Use Disorder  Alcohol use disorder is when your drinking disrupts your daily life. When you have this condition, you drink too much alcohol and you cannot control your drinking.  Alcohol use disorder can cause serious problems with your physical health. It can affect your brain, heart, liver, pancreas, immune system, stomach, and intestines. Alcohol use disorder can increase your risk for certain cancers and cause problems with your mental health, such as depression, anxiety, psychosis, delirium, and dementia. People with this disorder risk hurting themselves and others.  What are the causes?  This condition is caused by drinking too much alcohol over time. It is not caused by drinking too much alcohol only one or two times. Some people with this condition drink alcohol to cope with or escape from negative life events. Others drink to relieve pain or symptoms of mental illness.  What increases the risk?  You are more likely to develop  this condition if:  · You have a family history of alcohol use disorder.  · Your culture encourages drinking to the point of intoxication, or makes alcohol easy to get.  · You had a mood or conduct disorder in childhood.  · You have been a victim of abuse.  · You are an adolescent and:  ¨ You have poor grades or difficulties in school.  ¨ Your caregivers do not talk to you about saying no to alcohol, or supervise your activities.  ¨ You are impulsive or you have trouble with self-control.  What are the signs or symptoms?  Symptoms of this condition include:  · Drinking more than you want to.  · Drinking for longer than you want to.  · Trying several times to drink less or to control your drinking.  · Spending a lot of time getting alcohol, drinking, or recovering from drinking.  · Craving alcohol.  · Having problems at work, at school, or at home due to drinking.  · Having problems in relationships due to drinking.  · Drinking when it is dangerous to drink, such as before driving a car.  · Continuing to drink even though you know you might have a physical or mental problem related to drinking.  · Needing more and more alcohol to get the same effect you want from the alcohol (building up tolerance).  · Having symptoms of withdrawal when you stop drinking. Symptoms of withdrawal include:  ¨ Fatigue.  ¨ Nightmares.  ¨ Trouble sleeping.  ¨ Depression.  ¨ Anxiety.  ¨ Fever.  ¨ Seizures.  ¨ Severe confusion.  ¨ Feeling or seeing things that are not there (hallucinations).  ¨ Tremors.  ¨ Rapid heart rate.  ¨ Rapid breathing.  ¨ High blood pressure.  · Drinking to avoid symptoms of withdrawal.  How is this diagnosed?  This condition is diagnosed with an assessment. Your health care provider may start the assessment by asking three or four questions about your drinking.  Your health care provider may perform a physical exam or do lab tests to see if you have physical problems resulting from alcohol use. She or he may refer  you to a mental health professional for evaluation.  How is this treated?  Some people with alcohol use disorder are able to reduce their alcohol use to low-risk levels. Others need to completely quit drinking alcohol. When necessary, mental health professionals with specialized training in substance use treatment can help. Your health care provider can help you decide how severe your alcohol use disorder is and what type of treatment you need. The following forms of treatment are available:  · Detoxification. Detoxification involves quitting drinking and using prescription medicines within the first week to help lessen withdrawal symptoms. This treatment is important for people who have had withdrawal symptoms before and for heavy drinkers who are likely to have withdrawal symptoms. Alcohol withdrawal can be dangerous, and in severe cases, it can cause death. Detoxification may be provided in a home, community, or primary care setting, or in a hospital or substance use treatment facility.  · Counseling. This treatment is also called talk therapy. It is provided by substance use treatment counselors. A counselor can address the reasons you use alcohol and suggest ways to keep you from drinking again or to prevent problem drinking. The goals of talk therapy are to:  ¨ Find healthy activities and ways for you to cope with stress.  ¨ Identify and avoid the things that trigger your alcohol use.  ¨ Help you learn how to handle cravings.  · Medicines. Medicines can help treat alcohol use disorder by:  ¨ Decreasing alcohol cravings.  ¨ Decreasing the positive feeling you have when you drink alcohol.  ¨ Causing an uncomfortable physical reaction when you drink alcohol (aversion therapy).  · Support groups. Support groups are led by people who have quit drinking. They provide emotional support, advice, and guidance.  These forms of treatment are often combined. Some people with this condition benefit from a combination of  treatments provided by specialized substance use treatment centers.  Follow these instructions at home:  · Take over-the-counter and prescription medicines only as told by your health care provider.  · Check with your health care provider before starting any new medicines.  · Ask friends and family members not to offer you alcohol.  · Avoid situations where alcohol is served, including gatherings where others are drinking alcohol.  · Create a plan for what to do when you are tempted to use alcohol.  · Find hobbies or activities that you enjoy that do not include alcohol.  · Keep all follow-up visits as told by your health care provider. This is important.  How is this prevented?  · If you drink, limit alcohol intake to no more than 1 drink a day for nonpregnant women and 2 drinks a day for men. One drink equals 12 oz of beer, 5 oz of wine, or 1½ oz of hard liquor.  · If you have a mental health condition, get treatment and support.  · Do not give alcohol to adolescents.  · If you are an adolescent:  ¨ Do not drink alcohol.  ¨ Do not be afraid to say no if someone offers you alcohol. Speak up about why you do not want to drink. You can be a positive role model for your friends and set a good example for those around you by not drinking alcohol.  ¨ If your friends drink, spend time with others who do not drink alcohol. Make new friends who do not use alcohol.  ¨ Find healthy ways to manage stress and emotions, such as meditation or deep breathing, exercise, spending time in nature, listening to music, or talking with a trusted friend or family member.  Contact a health care provider if:  · You are not able to take your medicines as told.  · Your symptoms get worse.  · You return to drinking alcohol (relapse) and your symptoms get worse.  Get help right away if:  · You have thoughts about hurting yourself or others.  If you ever feel like you may hurt yourself or others, or have thoughts about taking your own life, get  help right away. You can go to your nearest emergency department or call:  · Your local emergency services (911 in the [place]  · A suicide crisis helpline, such as the National Suicide Prevention Lifeline at 1-445.473.5952. This is open 24 hours a day.  Summary  · Alcohol use disorder is when your drinking disrupts your daily life. When you have this condition, you drink too much alcohol and you cannot control your drinking.  · Treatment may include detoxification, counseling, medicine, and support groups.  · Ask friends and family members not to offer you alcohol. Avoid situations where alcohol is served.  · Get help right away if you have thoughts about hurting yourself or others.  This information is not intended to replace advice given to you by your health care provider. Make sure you discuss any questions you have with your health care provider.  Document Released: 01/25/2006 Document Revised: 09/14/2017 Document Reviewed: 09/14/2017  [employer] Interactive Patient Education © 2017 [employer]    Bacitracin skin ointment  What is this medicine?  BACITRACIN (bass i TRAY sin) is a polypeptide antibiotic. It is used to treat bacterial skin infections or to prevent infection of minor burns, cuts, or scrapes.  This medicine may be used for other purposes; ask your health care provider or pharmacist if you have questions.  What should I tell my health care provider before I take this medicine?  They need to know if you have any of these conditions:  -animal bites  -large areas of damaged skin  -puncture wound  -severe burns  -an unusual or allergic reaction to bacitracin, neomycin, other antibiotics, other medicines, foods, dyes, or preservatives  -pregnant or trying to get pregnant  -breast-feeding  How should I use this medicine?  This medicine is only for external use on the skin. Follow the directions on the prescription label. Wash hands before and after use. Apply a thin layer to cover the affected area. You  can cover the treated area with a sterile gauze dressing (bandage). Use this medicine at regular intervals. Do not use more often than directed. Finish the full course prescribed by your doctor or health care professional even if you think you are better. Do not stop using except on your doctor's advice.  Talk to your pediatrician regarding the use of this medicine in children. Special care may be needed.  Overdosage: If you think you have taken too much of this medicine contact a poison control center or emergency room at once.  NOTE: This medicine is only for you. Do not share this medicine with others.  What if I miss a dose?  If you miss a dose, use it as soon as you can. If it is almost time for your next dose, use only that dose. Do not use double or extra doses.  What may interact with this medicine?  Interactions are not expected. Do not use other skin care products unless your doctor or health care professional tells you to.  This list may not describe all possible interactions. Give your health care provider a list of all the medicines, herbs, non-prescription drugs, or dietary supplements you use. Also tell them if you smoke, drink alcohol, or use illegal drugs. Some items may interact with your medicine.  What should I watch for while using this medicine?  Tell your doctor or health care professional if the infection does not get better within 1 week or if they get worse.  Do not get this medicine in your eyes. If you do, rinse out with plenty of cool tap water.  What side effects may I notice from receiving this medicine?  Side effects that you should report to your doctor or health care professional as soon as possible:  -allergic reactions like skin rash, itching or hives, swelling of the face, lips, or tongue  -breathing problems  -chest tightness  -lower back pain  -pain, difficulty passing urine  Side effects that usually do not require medical attention (report to your doctor or health care  professional if they continue or are bothersome):  -skin irritation  This list may not describe all possible side effects. Call your doctor for medical advice about side effects. You may report side effects to FDA at 2-556-FDA-2633.  Where should I keep my medicine?  Keep out of the reach of children.  Store at room temperature between 15 and 30 degrees C (59 and 86 degrees F). Protect from light. Throw away any unused medicine after the expiration date.  NOTE: This sheet is a summary. It may not cover all possible information. If you have questions about this medicine, talk to your doctor, pharmacist, or health care provider.  © 2018 [employer] (2009-03-18 14:59:46)    Oxycodone tablets or capsules  What is this medicine?  OXYCODONE (ox i KOE done) is a pain reliever. It is used to treat moderate to severe pain.  This medicine may be used for other purposes; ask your health care provider or pharmacist if you have questions.  COMMON BRAND NAME(S): Dazidox, Endocodone, Oxaydo, OXECTA, OxyIR, Percolone, Roxicodone, ROXYBOND  What should I tell my health care provider before I take this medicine?  They need to know if you have any of these conditions:  -Corunna's disease  -brain tumor  -head injury  -heart disease  -history of drug or alcohol abuse problem  -if you often drink alcohol  -kidney disease  -liver disease  -lung or breathing disease, like asthma  -mental illness  -pancreatic disease  -seizures  -thyroid disease  -an unusual or allergic reaction to oxycodone, codeine, hydrocodone, morphine, other medicines, foods, dyes, or preservatives  -pregnant or trying to get pregnant  -breast-feeding  How should I use this medicine?  Take this medicine by mouth with a glass of water. Follow the directions on the prescription label. You can take it with or without food. If it upsets your stomach, take it with food. Take your medicine at regular intervals. Do not take it more often than directed. Do not stop  taking except on your doctor's advice.  Some brands of this medicine, like Oxecta, have special instructions. Ask your doctor or pharmacist if these directions are for you: Do not cut, crush or chew this medicine. Swallow only one tablet at a time. Do not wet, soak, or lick the tablet before you take it.  A special MedGuide will be given to you by the pharmacist with each prescription and refill. Be sure to read this information carefully each time.  Talk to your pediatrician regarding the use of this medicine in children. Special care may be needed.  Overdosage: If you think you have taken too much of this medicine contact a poison control center or emergency room at once.  NOTE: This medicine is only for you. Do not share this medicine with others.  What if I miss a dose?  If you miss a dose, take it as soon as you can. If it is almost time for your next dose, take only that dose. Do not take double or extra doses.  What may interact with this medicine?  This medicine may interact with the following medications:  -alcohol  -antihistamines for allergy, cough and cold  -antiviral medicines for HIV or AIDS  -atropine  -certain antibiotics like clarithromycin, erythromycin, linezolid, rifampin  -certain medicines for anxiety or sleep  -certain medicines for bladder problems like oxybutynin, tolterodine  -certain medicines for depression like amitriptyline, fluoxetine, sertraline  -certain medicines for fungal infections like ketoconazole, itraconazole, voriconazole  -certain medicines for migraine headache like almotriptan, eletriptan, frovatriptan, naratriptan, rizatriptan, sumatriptan, zolmitriptan  -certain medicines for nausea or vomiting like dolasetron, ondansetron, palonosetron  -certain medicines for Parkinson's disease like benztropine, trihexyphenidyl  -certain medicines for seizures like phenobarbital, phenytoin, primidone  -certain medicines for stomach problems like dicyclomine, hyoscyamine  -certain  medicines for travel sickness like scopolamine  -diuretics  -general anesthetics like halothane, isoflurane, methoxyflurane, propofol  -ipratropium  -local anesthetics like lidocaine, pramoxine, tetracaine  -MAOIs like Carbex, Eldepryl, Marplan, Nardil, and Parnate  -medicines that relax muscles for surgery  -methylene blue  -nilotinib  -other narcotic medicines for pain or cough  -phenothiazines like chlorpromazine, mesoridazine, prochlorperazine, thioridazine  This list may not describe all possible interactions. Give your health care provider a list of all the medicines, herbs, non-prescription drugs, or dietary supplements you use. Also tell them if you smoke, drink alcohol, or use illegal drugs. Some items may interact with your medicine.  What should I watch for while using this medicine?  Tell your doctor or health care professional if your pain does not go away, if it gets worse, or if you have new or a different type of pain. You may develop tolerance to the medicine. Tolerance means that you will need a higher dose of the medicine for pain relief. Tolerance is normal and is expected if you take this medicine for a long time.  Do not suddenly stop taking your medicine because you may develop a severe reaction. Your body becomes used to the medicine. This does NOT mean you are addicted. Addiction is a behavior related to getting and using a drug for a non-medical reason. If you have pain, you have a medical reason to take pain medicine. Your doctor will tell you how much medicine to take. If your doctor wants you to stop the medicine, the dose will be slowly lowered over time to avoid any side effects.  There are different types of narcotic medicines (opiates). If you take more than one type at the same time or if you are taking another medicine that also causes drowsiness, you may have more side effects. Give your health care provider a list of all medicines you use. Your doctor will tell you how much  medicine to take. Do not take more medicine than directed. Call emergency for help if you have problems breathing or unusual sleepiness.  You may get drowsy or dizzy. Do not drive, use machinery, or do anything that needs mental alertness until you know how the medicine affects you. Do not stand or sit up quickly, especially if you are an older patient. This reduces the risk of dizzy or fainting spells. Alcohol may interfere with the effect of this medicine. Avoid alcoholic drinks.  This medicine will cause constipation. Try to have a bowel movement at least every 2 to 3 days. If you do not have a bowel movement for 3 days, call your doctor or health care professional.  Your mouth may get dry. Chewing sugarless gum or sucking hard candy, and drinking plenty of water may help. Contact your doctor if the problem does not go away or is severe.  What side effects may I notice from receiving this medicine?  Side effects that you should report to your doctor or health care professional as soon as possible:  -allergic reactions like skin rash, itching or hives, swelling of the face, lips, or tongue  -breathing problems  -confusion  -signs and symptoms of low blood pressure like dizziness; feeling faint or lightheaded, falls; unusually weak or tired  -trouble passing urine or change in the amount of urine  -trouble swallowing  Side effects that usually do not require medical attention (report to your doctor or health care professional if they continue or are bothersome):  -constipation  -dry mouth  -nausea, vomiting  -tiredness  This list may not describe all possible side effects. Call your doctor for medical advice about side effects. You may report side effects to FDA at 5-082-FDA-9107.  Where should I keep my medicine?  Keep out of the reach of children. This medicine can be abused. Keep your medicine in a safe place to protect it from theft. Do not share this medicine with anyone. Selling or giving away this medicine  is dangerous and against the law.  Store at room temperature between 15 and 30 degrees C (59 and 86 degrees F). Protect from light. Keep container tightly closed.  This medicine may cause accidental overdose and death if it is taken by other adults, children, or pets. Flush any unused medicine down the toilet to reduce the chance of harm. Do not use the medicine after the expiration date.  NOTE: This sheet is a summary. It may not cover all possible information. If you have questions about this medicine, talk to your doctor, pharmacist, or health care provider.  © 2018 Elsevier/Gold Standard (2016-11-01 16:55:57)    Depression / Suicide Risk    As you are discharged from this [facility] facility, it is important to learn how to keep safe from harming yourself.    Recognize the warning signs:  · Abrupt changes in personality, positive or negative- including increase in energy   · Giving away possessions  · Change in eating patterns- significant weight changes-  positive or negative  · Change in sleeping patterns- unable to sleep or sleeping all the time   · Unwillingness or inability to communicate  · Depression  · Unusual sadness, discouragement and loneliness  · Talk of wanting to die  · Neglect of personal appearance   · Rebelliousness- reckless behavior  · Withdrawal from people/activities they love  · Confusion- inability to concentrate     If you or a loved one observes any of these behaviors or has concerns about self-harm, here's what you can do:  · Talk about it- your feelings and reasons for harming yourself  · Remove any means that you might use to hurt yourself (examples: pills, rope, extension cords, firearm)  · Get professional help from the community (Mental Health, Substance Abuse, psychological counseling)  · Do not be alone:Call your Safe Contact- someone whom you trust who will be there for you.  · Call your local CRISIS HOTLINE 824-1678 or 095-830-5822  · Call your local Children's Mobile  Crisis Response Team [place] (283) 621-6461 or www.BrightTALK.Alerts  · Call the toll free National Suicide Prevention Hotlines   · National Suicide Prevention Lifeline 197-033-TJQP (5755)  · National Hope Line Network 800-SUICIDE (568-7426)

## 2018-11-29 NOTE — PROGRESS NOTES
Assumed care at 0700. Bedside report received from Tereza. Patient's chart and MAR reviewed. Pt denies pain at this time, was recently medicated for head pain. Pt is A & O 4. Patient was updated on plan of care for the day. Questions answered and concerns addressed.  Pt denies any additional needs at this time. White board updated. Call light, phone and personal belongings within reach.
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
## 2018-11-29 NOTE — PROGRESS NOTES
Patient had a shower, gait steady, C/O pain in ribs on left side. Pain med given. Call bell in reach, SR up x2. Side rails padded for seizure precautions.

## 2018-11-29 NOTE — THERAPY
"Physical Therapy Evaluation completed.   Bed Mobility:  Supine to Sit: Supervised  Transfers: Sit to Stand: Supervised  Gait: Level Of Assist: Supervised with No Equipment Needed       Plan of Care: Patient with no further skilled PT needs in the acute care setting at this time  Discharge Recommendations: Equipment: No Equipment Needed. See below    After initial evaluation and pt education re: possible post concussive syndrome and self monitoring pt has no further acute PT needs. He was able to demonstrate hallway ambulation with no AD with Spv and appears functionally close to, if not at, functional baseline. He reports no concerns with functional ability to return to home once medically cleared.     See \"Rehab Therapy-Acute\" Patient Summary Report for complete documentation.     "

## 2018-11-30 NOTE — PROGRESS NOTES
Pt dc'd at 1700. IV and monitor removed; monitor room notified. Pt left unit via walking with his relative. Personal belongings with pt when leaving unit. Pt given discharge instructions prior to leaving unit including prescription and when to visit with physician; verbalizes understanding. Copy of discharge instructions with pt and in the chart.

## 2022-01-01 ENCOUNTER — HOSPITAL ENCOUNTER (INPATIENT)
Facility: MEDICAL CENTER | Age: 42
LOS: 3 days | DRG: 432 | End: 2022-02-13
Attending: EMERGENCY MEDICINE | Admitting: STUDENT IN AN ORGANIZED HEALTH CARE EDUCATION/TRAINING PROGRAM
Payer: COMMERCIAL

## 2022-01-01 ENCOUNTER — APPOINTMENT (OUTPATIENT)
Dept: RADIOLOGY | Facility: MEDICAL CENTER | Age: 42
DRG: 432 | End: 2022-01-01
Attending: EMERGENCY MEDICINE
Payer: COMMERCIAL

## 2022-01-01 ENCOUNTER — HOSPICE ADMISSION (OUTPATIENT)
Dept: HOSPICE | Facility: HOSPICE | Age: 42
End: 2022-01-01

## 2022-01-01 ENCOUNTER — HOME CARE VISIT (OUTPATIENT)
Dept: HOSPICE | Facility: HOSPICE | Age: 42
End: 2022-01-01

## 2022-01-01 VITALS
OXYGEN SATURATION: 80 % | HEART RATE: 84 BPM | DIASTOLIC BLOOD PRESSURE: 38 MMHG | TEMPERATURE: 97.5 F | RESPIRATION RATE: 22 BRPM | BODY MASS INDEX: 24.07 KG/M2 | SYSTOLIC BLOOD PRESSURE: 66 MMHG | WEIGHT: 171.96 LBS | HEIGHT: 71 IN

## 2022-01-01 LAB
ABO GROUP BLD: NORMAL
ALBUMIN SERPL BCP-MCNC: 2.7 G/DL (ref 3.2–4.9)
ALBUMIN SERPL BCP-MCNC: 3.3 G/DL (ref 3.2–4.9)
ALBUMIN/GLOB SERPL: 1.2 G/DL
ALBUMIN/GLOB SERPL: 1.8 G/DL
ALP SERPL-CCNC: 181 U/L (ref 30–99)
ALP SERPL-CCNC: 223 U/L (ref 30–99)
ALT SERPL-CCNC: 30 U/L (ref 2–50)
ALT SERPL-CCNC: 36 U/L (ref 2–50)
AMMONIA PLAS-SCNC: 98 UMOL/L (ref 11–45)
ANION GAP SERPL CALC-SCNC: 27 MMOL/L (ref 7–16)
ANION GAP SERPL CALC-SCNC: 28 MMOL/L (ref 7–16)
APTT PPP: 95 SEC (ref 24.7–36)
AST SERPL-CCNC: 136 U/L (ref 12–45)
AST SERPL-CCNC: 154 U/L (ref 12–45)
BASE EXCESS BLDV CALC-SCNC: -10 MMOL/L
BASOPHILS # BLD AUTO: 0.5 % (ref 0–1.8)
BASOPHILS # BLD AUTO: 0.6 % (ref 0–1.8)
BASOPHILS # BLD: 0.09 K/UL (ref 0–0.12)
BASOPHILS # BLD: 0.12 K/UL (ref 0–0.12)
BILIRUB SERPL-MCNC: 30.9 MG/DL (ref 0.1–1.5)
BILIRUB SERPL-MCNC: 31.9 MG/DL (ref 0.1–1.5)
BLD GP AB SCN SERPL QL: NORMAL
BODY TEMPERATURE: ABNORMAL CENTIGRADE
BUN SERPL-MCNC: 113 MG/DL (ref 8–22)
BUN SERPL-MCNC: 114 MG/DL (ref 8–22)
CALCIUM SERPL-MCNC: 7.4 MG/DL (ref 8.5–10.5)
CALCIUM SERPL-MCNC: 7.4 MG/DL (ref 8.5–10.5)
CHLORIDE SERPL-SCNC: 90 MMOL/L (ref 96–112)
CHLORIDE SERPL-SCNC: 90 MMOL/L (ref 96–112)
CO2 SERPL-SCNC: 13 MMOL/L (ref 20–33)
CO2 SERPL-SCNC: 14 MMOL/L (ref 20–33)
CREAT SERPL-MCNC: 8.62 MG/DL (ref 0.5–1.4)
CREAT SERPL-MCNC: 8.97 MG/DL (ref 0.5–1.4)
EKG IMPRESSION: NORMAL
EOSINOPHIL # BLD AUTO: 0.01 K/UL (ref 0–0.51)
EOSINOPHIL # BLD AUTO: 0.01 K/UL (ref 0–0.51)
EOSINOPHIL NFR BLD: 0 % (ref 0–6.9)
EOSINOPHIL NFR BLD: 0.1 % (ref 0–6.9)
ERYTHROCYTE [DISTWIDTH] IN BLOOD BY AUTOMATED COUNT: 53.9 FL (ref 35.9–50)
ERYTHROCYTE [DISTWIDTH] IN BLOOD BY AUTOMATED COUNT: 56.9 FL (ref 35.9–50)
ETHANOL BLD-MCNC: <10.1 MG/DL (ref 0–10)
GLOBULIN SER CALC-MCNC: 1.8 G/DL (ref 1.9–3.5)
GLOBULIN SER CALC-MCNC: 2.3 G/DL (ref 1.9–3.5)
GLUCOSE SERPL-MCNC: 114 MG/DL (ref 65–99)
GLUCOSE SERPL-MCNC: 116 MG/DL (ref 65–99)
HCO3 BLDV-SCNC: 15 MMOL/L (ref 24–28)
HCT VFR BLD AUTO: 22.6 % (ref 42–52)
HCT VFR BLD AUTO: 24.9 % (ref 42–52)
HGB BLD-MCNC: 7.9 G/DL (ref 14–18)
HGB BLD-MCNC: 9.3 G/DL (ref 14–18)
IMM GRANULOCYTES # BLD AUTO: 0.22 K/UL (ref 0–0.11)
IMM GRANULOCYTES # BLD AUTO: 0.34 K/UL (ref 0–0.11)
IMM GRANULOCYTES NFR BLD AUTO: 1.3 % (ref 0–0.9)
IMM GRANULOCYTES NFR BLD AUTO: 1.7 % (ref 0–0.9)
INR PPP: 2.58 (ref 0.87–1.13)
LACTATE BLD-SCNC: 1.8 MMOL/L (ref 0.5–2)
LIPASE SERPL-CCNC: 186 U/L (ref 11–82)
LYMPHOCYTES # BLD AUTO: 0.53 K/UL (ref 1–4.8)
LYMPHOCYTES # BLD AUTO: 0.54 K/UL (ref 1–4.8)
LYMPHOCYTES NFR BLD: 2.6 % (ref 22–41)
LYMPHOCYTES NFR BLD: 3.1 % (ref 22–41)
MAGNESIUM SERPL-MCNC: 2.7 MG/DL (ref 1.5–2.5)
MCH RBC QN AUTO: 33.3 PG (ref 27–33)
MCH RBC QN AUTO: 35.6 PG (ref 27–33)
MCHC RBC AUTO-ENTMCNC: 35 G/DL (ref 33.7–35.3)
MCHC RBC AUTO-ENTMCNC: 37.3 G/DL (ref 33.7–35.3)
MCV RBC AUTO: 95.4 FL (ref 81.4–97.8)
MCV RBC AUTO: 95.4 FL (ref 81.4–97.8)
MONOCYTES # BLD AUTO: 0.5 K/UL (ref 0–0.85)
MONOCYTES # BLD AUTO: 0.56 K/UL (ref 0–0.85)
MONOCYTES NFR BLD AUTO: 2.4 % (ref 0–13.4)
MONOCYTES NFR BLD AUTO: 3.2 % (ref 0–13.4)
NEUTROPHILS # BLD AUTO: 15.86 K/UL (ref 1.82–7.42)
NEUTROPHILS # BLD AUTO: 19.02 K/UL (ref 1.82–7.42)
NEUTROPHILS NFR BLD: 91.8 % (ref 44–72)
NEUTROPHILS NFR BLD: 92.7 % (ref 44–72)
NRBC # BLD AUTO: 0 K/UL
NRBC # BLD AUTO: 0 K/UL
NRBC BLD-RTO: 0 /100 WBC
NRBC BLD-RTO: 0 /100 WBC
PCO2 BLDV: 30 MMHG (ref 41–51)
PH BLDV: 7.32 [PH] (ref 7.31–7.45)
PLATELET # BLD AUTO: 65 K/UL (ref 164–446)
PLATELET # BLD AUTO: 76 K/UL (ref 164–446)
PMV BLD AUTO: 11.2 FL (ref 9–12.9)
PMV BLD AUTO: 11.2 FL (ref 9–12.9)
PO2 BLDV: 39.4 MMHG (ref 25–40)
POTASSIUM SERPL-SCNC: 3.6 MMOL/L (ref 3.6–5.5)
POTASSIUM SERPL-SCNC: 3.7 MMOL/L (ref 3.6–5.5)
PROT SERPL-MCNC: 5 G/DL (ref 6–8.2)
PROT SERPL-MCNC: 5.1 G/DL (ref 6–8.2)
PROTHROMBIN TIME: 26.9 SEC (ref 12–14.6)
RBC # BLD AUTO: 2.37 M/UL (ref 4.7–6.1)
RBC # BLD AUTO: 2.61 M/UL (ref 4.7–6.1)
RH BLD: NORMAL
SAO2 % BLDV: 64.3 %
SODIUM SERPL-SCNC: 131 MMOL/L (ref 135–145)
SODIUM SERPL-SCNC: 131 MMOL/L (ref 135–145)
TROPONIN T SERPL-MCNC: 35 NG/L (ref 6–19)
WBC # BLD AUTO: 17.3 K/UL (ref 4.8–10.8)
WBC # BLD AUTO: 20.5 K/UL (ref 4.8–10.8)

## 2022-01-01 PROCEDURE — 700111 HCHG RX REV CODE 636 W/ 250 OVERRIDE (IP): Performed by: STUDENT IN AN ORGANIZED HEALTH CARE EDUCATION/TRAINING PROGRAM

## 2022-01-01 PROCEDURE — 700101 HCHG RX REV CODE 250: Performed by: STUDENT IN AN ORGANIZED HEALTH CARE EDUCATION/TRAINING PROGRAM

## 2022-01-01 PROCEDURE — 82803 BLOOD GASES ANY COMBINATION: CPT

## 2022-01-01 PROCEDURE — 99497 ADVNCD CARE PLAN 30 MIN: CPT | Performed by: STUDENT IN AN ORGANIZED HEALTH CARE EDUCATION/TRAINING PROGRAM

## 2022-01-01 PROCEDURE — 85730 THROMBOPLASTIN TIME PARTIAL: CPT

## 2022-01-01 PROCEDURE — 71045 X-RAY EXAM CHEST 1 VIEW: CPT

## 2022-01-01 PROCEDURE — C9113 INJ PANTOPRAZOLE SODIUM, VIA: HCPCS | Performed by: EMERGENCY MEDICINE

## 2022-01-01 PROCEDURE — 36415 COLL VENOUS BLD VENIPUNCTURE: CPT

## 2022-01-01 PROCEDURE — 700105 HCHG RX REV CODE 258: Performed by: EMERGENCY MEDICINE

## 2022-01-01 PROCEDURE — 85610 PROTHROMBIN TIME: CPT

## 2022-01-01 PROCEDURE — 86850 RBC ANTIBODY SCREEN: CPT

## 2022-01-01 PROCEDURE — 86901 BLOOD TYPING SEROLOGIC RH(D): CPT

## 2022-01-01 PROCEDURE — 80053 COMPREHEN METABOLIC PANEL: CPT | Mod: 91

## 2022-01-01 PROCEDURE — 700105 HCHG RX REV CODE 258: Performed by: STUDENT IN AN ORGANIZED HEALTH CARE EDUCATION/TRAINING PROGRAM

## 2022-01-01 PROCEDURE — A9270 NON-COVERED ITEM OR SERVICE: HCPCS | Performed by: STUDENT IN AN ORGANIZED HEALTH CARE EDUCATION/TRAINING PROGRAM

## 2022-01-01 PROCEDURE — 82140 ASSAY OF AMMONIA: CPT

## 2022-01-01 PROCEDURE — 700102 HCHG RX REV CODE 250 W/ 637 OVERRIDE(OP): Performed by: STUDENT IN AN ORGANIZED HEALTH CARE EDUCATION/TRAINING PROGRAM

## 2022-01-01 PROCEDURE — 99232 SBSQ HOSP IP/OBS MODERATE 35: CPT | Performed by: STUDENT IN AN ORGANIZED HEALTH CARE EDUCATION/TRAINING PROGRAM

## 2022-01-01 PROCEDURE — 93005 ELECTROCARDIOGRAM TRACING: CPT | Performed by: EMERGENCY MEDICINE

## 2022-01-01 PROCEDURE — C9113 INJ PANTOPRAZOLE SODIUM, VIA: HCPCS | Performed by: STUDENT IN AN ORGANIZED HEALTH CARE EDUCATION/TRAINING PROGRAM

## 2022-01-01 PROCEDURE — 82077 ASSAY SPEC XCP UR&BREATH IA: CPT

## 2022-01-01 PROCEDURE — 83690 ASSAY OF LIPASE: CPT

## 2022-01-01 PROCEDURE — 99223 1ST HOSP IP/OBS HIGH 75: CPT | Performed by: STUDENT IN AN ORGANIZED HEALTH CARE EDUCATION/TRAINING PROGRAM

## 2022-01-01 PROCEDURE — 770001 HCHG ROOM/CARE - MED/SURG/GYN PRIV*

## 2022-01-01 PROCEDURE — 85025 COMPLETE CBC W/AUTO DIFF WBC: CPT | Mod: 91

## 2022-01-01 PROCEDURE — 83605 ASSAY OF LACTIC ACID: CPT

## 2022-01-01 PROCEDURE — 770000 HCHG ROOM/CARE - INTERMEDIATE ICU *

## 2022-01-01 PROCEDURE — 99291 CRITICAL CARE FIRST HOUR: CPT

## 2022-01-01 PROCEDURE — P9047 ALBUMIN (HUMAN), 25%, 50ML: HCPCS | Performed by: STUDENT IN AN ORGANIZED HEALTH CARE EDUCATION/TRAINING PROGRAM

## 2022-01-01 PROCEDURE — 700111 HCHG RX REV CODE 636 W/ 250 OVERRIDE (IP): Performed by: EMERGENCY MEDICINE

## 2022-01-01 PROCEDURE — 83735 ASSAY OF MAGNESIUM: CPT

## 2022-01-01 PROCEDURE — 99232 SBSQ HOSP IP/OBS MODERATE 35: CPT | Performed by: HOSPITALIST

## 2022-01-01 PROCEDURE — 86900 BLOOD TYPING SEROLOGIC ABO: CPT

## 2022-01-01 PROCEDURE — 84484 ASSAY OF TROPONIN QUANT: CPT

## 2022-01-01 PROCEDURE — 96365 THER/PROPH/DIAG IV INF INIT: CPT

## 2022-01-01 RX ORDER — MORPHINE SULFATE 10 MG/ML
10 INJECTION, SOLUTION INTRAMUSCULAR; INTRAVENOUS
Status: DISCONTINUED | OUTPATIENT
Start: 2022-01-01 | End: 2022-01-01 | Stop reason: HOSPADM

## 2022-01-01 RX ORDER — SODIUM CHLORIDE 9 MG/ML
500 INJECTION, SOLUTION INTRAVENOUS ONCE
Status: COMPLETED | OUTPATIENT
Start: 2022-01-01 | End: 2022-01-01

## 2022-01-01 RX ORDER — LORAZEPAM 2 MG/ML
1 CONCENTRATE ORAL
Status: DISCONTINUED | OUTPATIENT
Start: 2022-01-01 | End: 2022-01-01 | Stop reason: HOSPADM

## 2022-01-01 RX ORDER — ACETAMINOPHEN 650 MG/1
650 SUPPOSITORY RECTAL EVERY 4 HOURS PRN
Status: DISCONTINUED | OUTPATIENT
Start: 2022-01-01 | End: 2022-01-01 | Stop reason: HOSPADM

## 2022-01-01 RX ORDER — ATROPINE SULFATE 10 MG/ML
2 SOLUTION/ DROPS OPHTHALMIC EVERY 4 HOURS PRN
Status: DISCONTINUED | OUTPATIENT
Start: 2022-01-01 | End: 2022-01-01 | Stop reason: HOSPADM

## 2022-01-01 RX ORDER — LORAZEPAM 2 MG/ML
1 INJECTION INTRAMUSCULAR
Status: DISCONTINUED | OUTPATIENT
Start: 2022-01-01 | End: 2022-01-01 | Stop reason: HOSPADM

## 2022-01-01 RX ORDER — SODIUM CHLORIDE 9 MG/ML
INJECTION, SOLUTION INTRAVENOUS CONTINUOUS
Status: DISCONTINUED | OUTPATIENT
Start: 2022-01-01 | End: 2022-01-01

## 2022-01-01 RX ORDER — POLYVINYL ALCOHOL 14 MG/ML
2 SOLUTION/ DROPS OPHTHALMIC EVERY 6 HOURS PRN
Status: DISCONTINUED | OUTPATIENT
Start: 2022-01-01 | End: 2022-01-01 | Stop reason: HOSPADM

## 2022-01-01 RX ORDER — ACETAMINOPHEN 325 MG/1
650 TABLET ORAL EVERY 4 HOURS PRN
Status: DISCONTINUED | OUTPATIENT
Start: 2022-01-01 | End: 2022-01-01 | Stop reason: HOSPADM

## 2022-01-01 RX ORDER — ALBUMIN (HUMAN) 12.5 G/50ML
75 SOLUTION INTRAVENOUS DAILY
Status: DISCONTINUED | OUTPATIENT
Start: 2022-01-01 | End: 2022-01-01

## 2022-01-01 RX ORDER — ONDANSETRON 2 MG/ML
8 INJECTION INTRAMUSCULAR; INTRAVENOUS EVERY 8 HOURS PRN
Status: DISCONTINUED | OUTPATIENT
Start: 2022-01-01 | End: 2022-01-01 | Stop reason: HOSPADM

## 2022-01-01 RX ORDER — CEFTRIAXONE 1 G/1
1 INJECTION, POWDER, FOR SOLUTION INTRAMUSCULAR; INTRAVENOUS ONCE
Status: DISCONTINUED | OUTPATIENT
Start: 2022-01-01 | End: 2022-01-01

## 2022-01-01 RX ORDER — PANTOPRAZOLE SODIUM 40 MG/10ML
40 INJECTION, POWDER, LYOPHILIZED, FOR SOLUTION INTRAVENOUS 2 TIMES DAILY
Status: DISCONTINUED | OUTPATIENT
Start: 2022-01-01 | End: 2022-01-01

## 2022-01-01 RX ORDER — ONDANSETRON 4 MG/1
8 TABLET, ORALLY DISINTEGRATING ORAL EVERY 8 HOURS PRN
Status: DISCONTINUED | OUTPATIENT
Start: 2022-01-01 | End: 2022-01-01 | Stop reason: HOSPADM

## 2022-01-01 RX ORDER — OCTREOTIDE ACETATE 100 UG/ML
50 INJECTION, SOLUTION INTRAVENOUS; SUBCUTANEOUS ONCE
Status: COMPLETED | OUTPATIENT
Start: 2022-01-01 | End: 2022-01-01

## 2022-01-01 RX ORDER — MORPHINE SULFATE 4 MG/ML
1-5 INJECTION INTRAVENOUS
Status: DISCONTINUED | OUTPATIENT
Start: 2022-01-01 | End: 2022-01-01 | Stop reason: HOSPADM

## 2022-01-01 RX ORDER — MIDODRINE HYDROCHLORIDE 5 MG/1
10 TABLET ORAL
Status: DISCONTINUED | OUTPATIENT
Start: 2022-01-01 | End: 2022-01-01

## 2022-01-01 RX ADMIN — OCTREOTIDE ACETATE 50 MCG: 100 INJECTION, SOLUTION INTRAVENOUS; SUBCUTANEOUS at 22:44

## 2022-01-01 RX ADMIN — ATROPINE SULFATE 2 DROP: 10 SOLUTION OPHTHALMIC at 21:00

## 2022-01-01 RX ADMIN — ATROPINE SULFATE 2 DROP: 10 SOLUTION OPHTHALMIC at 01:11

## 2022-01-01 RX ADMIN — MORPHINE SULFATE 10 MG: 10 INJECTION INTRAVENOUS at 18:10

## 2022-01-01 RX ADMIN — ATROPINE SULFATE 2 DROP: 10 SOLUTION OPHTHALMIC at 09:42

## 2022-01-01 RX ADMIN — SODIUM CHLORIDE 80 MG: 9 INJECTION, SOLUTION INTRAVENOUS at 20:29

## 2022-01-01 RX ADMIN — SODIUM CHLORIDE: 9 INJECTION, SOLUTION INTRAVENOUS at 01:00

## 2022-01-01 RX ADMIN — ALBUMIN (HUMAN) 75 G: 5 SOLUTION INTRAVENOUS at 22:05

## 2022-01-01 RX ADMIN — PANTOPRAZOLE SODIUM 40 MG: 40 INJECTION, POWDER, LYOPHILIZED, FOR SOLUTION INTRAVENOUS at 22:44

## 2022-01-01 RX ADMIN — MORPHINE SULFATE 10 MG: 10 INJECTION INTRAVENOUS at 05:24

## 2022-01-01 RX ADMIN — MORPHINE SULFATE 10 MG: 10 INJECTION INTRAVENOUS at 13:09

## 2022-01-01 RX ADMIN — OCTREOTIDE ACETATE 50 MCG/HR: 200 INJECTION, SOLUTION INTRAVENOUS; SUBCUTANEOUS at 22:06

## 2022-01-01 RX ADMIN — MORPHINE SULFATE 10 MG: 10 INJECTION INTRAVENOUS at 01:11

## 2022-01-01 RX ADMIN — MIDODRINE HYDROCHLORIDE 10 MG: 5 TABLET ORAL at 22:41

## 2022-01-01 RX ADMIN — MORPHINE SULFATE 10 MG: 10 INJECTION INTRAVENOUS at 17:02

## 2022-01-01 RX ADMIN — MORPHINE SULFATE 10 MG: 10 INJECTION INTRAVENOUS at 09:34

## 2022-01-01 RX ADMIN — ATROPINE SULFATE 2 DROP: 10 SOLUTION OPHTHALMIC at 17:02

## 2022-01-01 RX ADMIN — MORPHINE SULFATE 4 MG: 4 INJECTION INTRAVENOUS at 04:55

## 2022-01-01 RX ADMIN — MORPHINE SULFATE 10 MG: 10 INJECTION INTRAVENOUS at 20:45

## 2022-01-01 RX ADMIN — MORPHINE SULFATE 10 MG: 10 INJECTION INTRAVENOUS at 11:37

## 2022-01-01 RX ADMIN — MORPHINE SULFATE 10 MG: 10 INJECTION INTRAVENOUS at 06:24

## 2022-01-01 RX ADMIN — MORPHINE SULFATE 10 MG: 10 INJECTION INTRAVENOUS at 09:01

## 2022-01-01 RX ADMIN — MORPHINE SULFATE 10 MG: 10 INJECTION INTRAVENOUS at 07:29

## 2022-01-01 RX ADMIN — MORPHINE SULFATE 10 MG: 10 INJECTION INTRAVENOUS at 15:45

## 2022-01-01 RX ADMIN — ATROPINE SULFATE 2 DROP: 10 SOLUTION OPHTHALMIC at 05:25

## 2022-01-01 RX ADMIN — SODIUM CHLORIDE 500 ML: 9 INJECTION, SOLUTION INTRAVENOUS at 00:30

## 2022-01-01 ASSESSMENT — ACTIVITIES OF DAILY LIVING (ADL)
BATHING_REQUIRES_ASSISTANCE: 1
EATING_REQUIRES_ASSISTANCE: 1
CONTINENCE_REQUIRES_ASSISTANCE: 1
DRESSING_REQUIRES_ASSISTANCE: 1
PHYSICAL_TRANSFER_REQUIRES_ASSISTANCE: 1
AMBULATION_REQUIRES_ASSISTANCE: 1

## 2022-01-01 ASSESSMENT — PAIN DESCRIPTION - PAIN TYPE
TYPE: ACUTE PAIN;CHRONIC PAIN
TYPE: ACUTE PAIN;CHRONIC PAIN
TYPE: ACUTE PAIN
TYPE: ACUTE PAIN;CHRONIC PAIN

## 2022-01-01 ASSESSMENT — ENCOUNTER SYMPTOMS: CHANGE IN LEVEL OF CONSCIOUSNESS: 1

## 2022-01-01 ASSESSMENT — FIBROSIS 4 INDEX: FIB4 SCORE: 16.98

## 2022-02-10 PROBLEM — N17.9 AKI (ACUTE KIDNEY INJURY) (HCC): Status: ACTIVE | Noted: 2022-01-01

## 2022-02-10 PROBLEM — K92.1 MELENA: Status: ACTIVE | Noted: 2022-01-01

## 2022-02-10 PROBLEM — K72.90 LIVER FAILURE WITHOUT HEPATIC COMA (HCC): Status: ACTIVE | Noted: 2022-01-01

## 2022-02-11 PROBLEM — K72.91 LIVER FAILURE WITH HEPATIC COMA (HCC): Status: ACTIVE | Noted: 2022-01-01

## 2022-02-11 NOTE — ED NOTES
Bedside report to receiving RN Diamante for T627. Questions answered. All belongings accounted for at transfer.

## 2022-02-11 NOTE — ASSESSMENT & PLAN NOTE
Possibly due to hepatorenal syndrome  Octreotide  Midodrine  Already improving  Likely will not recover substantially, dialysis is not something that will change his overall outcome    I have ordered him albumin 1 g/kg for 2 days

## 2022-02-11 NOTE — ASSESSMENT & PLAN NOTE
Unclear source though likely upper GI  ?  Varices,?  Gastritis,?  Hypertensive gastropathy etc. Etc.  GI was seeing them in the emergency department  Does not need emergent endoscopy as currently stable  Has received FFP already  Bilateral large-bore IVs  Active type and screen    PPI twice daily  Octreotide drip  Transfuse Hgb > 7  Transfuse platelet > 50

## 2022-02-11 NOTE — PROGRESS NOTES
Gastroenterology Progress Note     Author: Barbara Horton M.D.   Date & Time Created: 2/11/2022 9:21 AM    Chief Complaint:  Liver failure, renal failure, hematochezia    Interval History:  Overnight, the patient developed severe hypotension with blood pressure 80s/40s. Dr. Martinez spoke extensively to the patient and the patient was placed onto comfort care.    Review of Systems:  Review of Systems   Unable to perform ROS: Acuity of condition         Current Facility-Administered Medications:   •  MD ALERT...adult comfort care, , Other, PRN, Aroldo Martinez M.D.  •  atropine 1 % ophthalmic solution 2 Drop, 2 Drop, Sublingual, Q4HRS PRN, Aroldo Martinez M.D.  •  acetaminophen (Tylenol) tablet 650 mg, 650 mg, Oral, Q4HRS PRN **OR** acetaminophen (TYLENOL) suppository 650 mg, 650 mg, Rectal, Q4HRS PRN, Aroldo Martinez M.D.  •  morphine 4 MG/ML injection 1-5 mg, 1-5 mg, Intravenous, Q HOUR PRN, Aroldo Martinez M.D., 4 mg at 02/11/22 0455  •  morphine (pf) 10 mg/mL injection 10 mg, 10 mg, Intravenous, Q HOUR PRN, Aroldo Martinez M.D., 10 mg at 02/11/22 0624  •  ondansetron (ZOFRAN ODT) dispertab 8 mg, 8 mg, Oral, Q8HRS PRN **OR** ondansetron (ZOFRAN) syringe/vial injection 8 mg, 8 mg, Intravenous, Q8HRS PRN, Aroldo Martinez M.D.  •  LORazepam (ATIVAN) 2 MG/ML oral conc 1 mg, 1 mg, Sublingual, Q HOUR PRN **OR** LORazepam (ATIVAN) injection 1 mg, 1 mg, Intravenous, Q HOUR PRN, Aroldo Martinez M.D.  •  artificial tears ophthalmic solution 2 Drop, 2 Drop, Both Eyes, Q6HRS PRN, Aroldo Martinez M.D.  •  NS infusion, , Intravenous, Continuous, Kaushal Sanders M.D., Last Rate: 100 mL/hr at 02/11/22 0100, New Bag at 02/11/22 0100     Physical Exam:  Vitals:    02/11/22 0840   BP: (!) 66/38   Pulse: 66   Resp:    Temp: 36.6 °C (97.9 °F)   SpO2: 95%        Physical Exam  Vitals and nursing note reviewed.   Constitutional:       Appearance: He is ill-appearing and toxic-appearing.      Comments: Laying in bed    HENT:      Head: Normocephalic and atraumatic.      Right Ear: External ear normal.      Left Ear: External ear normal.   Pulmonary:      Comments: Unlabored breathing  Musculoskeletal:      Cervical back: Neck supple.   Skin:     General: Skin is warm.      Coloration: Skin is jaundiced.   Neurological:      Mental Status: He is disoriented.         Labs:          Recent Labs     02/10/22  1937 02/10/22  2330   SODIUM 131* 131*   POTASSIUM 3.7 3.6   CHLORIDE 90* 90*   CO2 14* 13*   * 113*   CREATININE 8.97* 8.62*   MAGNESIUM 2.7*  --    CALCIUM 7.4* 7.4*     Recent Labs     02/10/22  1937 02/10/22  2330   ALTSGPT 36 30   ASTSGOT 154* 136*   ALKPHOSPHAT 223* 181*   TBILIRUBIN 31.9* 30.9*   LIPASE 186*  --    GLUCOSE 114* 116*     Recent Labs     02/10/22  1937 02/10/22  2000 02/10/22  2330   RBC  --  2.61* 2.37*   HEMOGLOBIN  --  9.3* 7.9*   HEMATOCRIT  --  24.9* 22.6*   PLATELETCT  --  76* 65*   PROTHROMBTM 26.9*  --   --    APTT 95.0*  --   --    INR 2.58*  --   --      Recent Labs     02/10/22  1937 02/10/22  2000 02/10/22  2330   WBC  --  20.5* 17.3*   NEUTSPOLYS  --  92.70* 91.80*   LYMPHOCYTES  --  2.60* 3.10*   MONOCYTES  --  2.40 3.20   EOSINOPHILS  --  0.00 0.10   BASOPHILS  --  0.60 0.50   ASTSGOT 154*  --  136*   ALTSGPT 36  --  30   ALKPHOSPHAT 223*  --  181*   TBILIRUBIN 31.9*  --  30.9*       Imaging:  DX-CHEST-PORTABLE (1 VIEW)   Final Result         1.  No acute cardiopulmonary disease.           Assessment:  This is a pleasant 41-year-old male with a past medical history significant for alcohol use above recommended limits (up to 1 gallon of vodka per day) who presented to the hospital for hematochezia for the last 3 to 4 days.  He was previously found to be severely anemic and underwent blood transfusion and transfusion of fresh frozen platelets.    Agree with the plan for comfort care.  GI will sign off, thank you for this consult.    Barbara Horton MD, MPH  [employer] [facility], PGY-2

## 2022-02-11 NOTE — PROGRESS NOTES
Nocturnist cross cover progress note:    Called to bedside for hypotension, blood pressure 80s over 40s.    Briefly, this is a 41-year-old male with end-stage alcoholic liver cirrhosis, CKD, prior GI bleeds, presented to OSH 2/10/22 for hematochezia over the prior 4 days.  Patient reports he stopped drinking alcohol 15 days ago, was drinking up to 1 gallon of vodka per day prior to that, has been drinking alcohol most of his life.  He was transferred to [facility] for GI evaluation.  Found to be Covid positive.  On admission meld score greater than 40, serum creatinine 8.62 GFR 7, bilirubin 31, platelets 65, initial hemoglobin 9.3 dropped down to 7.9 after he had continued bloody bowel movements.  He was hypotensive and in pain throughout his whole body.  Per bedside RN he is becoming less interactive in the short time that he has been on the unit.  After reviewing the chart I had a long conversation with Denilson.      He recognizes both his liver and kidneys have failed.  He does not want to pursue any aggressive or invasive procedures such as dialysis or endoscopy/colonoscopy.  He was told 2 years ago that he had a year and a half to live from his cirrhosis and he unfortunately continued drinking and was noncompliant with medical care. He is in pain over most of his body and he is uncomfortable and says he is tired and wants to stop, wants to be made comfortable. We had a long talk about goals of care and he requested to be made comfort care.  Prior to this I called his emergency contact Caitlyn (listed as mother), and is actually the patient's aunt but she has raised him since he was 1.  She mentioned that both she and the patient had a feeling he was not going to make it out of the hospital when he came in and that he was in an end-of-life situation. She is aware his ETOH cirrhosis has been out of control and he continued to drink and avoid medical care so they felt this was inevitable. She reiterated his desires of  not wanting any aggressive or invasive treatments, they had spoke about this in the past and she confirmed his wishes.  She is going to be unable to make it into the hospital but both the patient and his mother/aunt are requesting patient be transitioned to comfort care.  Comfort care orders placed, discussed with bedside RN.    Aroldo Martinez M.D.    I discussed advance care planning with the patient and family for at least 35 minutes, including diagnosis, prognosis, plan of care, risks and benefits of any therapies that could be offered, as well as alternatives including palliation and hospice, as appropriate.

## 2022-02-11 NOTE — CONSULTS
Gastroenterology Consult Note     Date of Consult: 02/10/2022  Referring Physician: Yaw     Reason for consult: liver failure, renal failure, hematochezia        HPI: This is a 40 yo male with history of alcoholism who presented after developing hematochezia in the last 3-4 days. He says that he quit drinking 15 days ago. He was drinking up to 1 gallon of Vodka per day. He says that he just felt like he needed to stop. He started to become jaundice and then he started passing blood 6-7 times a day. His last bloody BM was yesterday. He has been fatigued and confused. He says that this has never happened previously. He denies prior hepatitis. He does not use drugs. He was found to have a bilirubin of 40 at the OSH as well as a Creatinine of 10 and INR of 3. He was transfused 2un PRBCs and FFP and transferred for possible GI care.     PMHX: Says he was previously healthy       PSurgHx:   Past Surgical History:   Procedure Laterality Date   • MANDIBLE FRACTURE ORIF  6/9/2011    Performed by LOYDA COX at SURGERY [facility] ORS   • NASAL FRACTURE REDUCTION CLOSED  6/9/2011    Performed by LOYDA COX at SURG[facility]   • LACERATION REPAIR  6/9/2011    Performed by LOYDA COX at SURGERY [facility] ORS        ALLERGIES:Patient has no known allergies.     SocHx:   Social History     Socioeconomic History   • Marital status: Single     Spouse name: Not on file   • Number of children: Not on file   • Years of education: Not on file   • Highest education level: Not on file   Occupational History   • Not on file   Tobacco Use   • Smoking status: Never Smoker   • Smokeless tobacco: Not on file   Substance and Sexual Activity   • Alcohol use: Yes     Comment: 6x 24oz beer daily   • Drug use: No   • Sexual activity: Not on file   Other Topics Concern   • Not on file   Social History Narrative    ** Merged History Encounter **          Social Determinants of  Health     Financial Resource Strain:    • Difficulty of Paying Living Expenses: Not on file   Food Insecurity:    • Worried About Running Out of Food in the Last Year: Not on file   • Ran Out of Food in the Last Year: Not on file   Transportation Needs:    • Lack of Transportation (Medical): Not on file   • Lack of Transportation (Non-Medical): Not on file   Physical Activity:    • Days of Exercise per Week: Not on file   • Minutes of Exercise per Session: Not on file   Stress:    • Feeling of Stress : Not on file   Social Connections:    • Frequency of Communication with Friends and Family: Not on file   • Frequency of Social Gatherings with Friends and Family: Not on file   • Attends [employer] Services: Not on file   • Active Member of Clubs or Organizations: Not on file   • Attends Club or Organization Meetings: Not on file   • Marital Status: Not on file   Intimate Partner Violence:    • Fear of Current or Ex-Partner: Not on file   • Emotionally Abused: Not on file   • Physically Abused: Not on file   • Sexually Abused: Not on file   Housing Stability:    • Unable to Pay for Housing in the Last Year: Not on file   • Number of Places Lived in the Last Year: Not on file   • Unstable Housing in the Last Year: Not on file        FAMHx: no family history of GI/Liver disorder     ROS:  Constitutional: No fevers, chills, no night sweats, no weight changes  HEENT: no vision or hearing changes, no dry mouth, no change in smell  CARDIO: no palpitations, no orthopnea, no chest pain  PULM: no cough, no shortness of breath  NEURO: no Seizures, no memory impairment, no change in sensation  GI: as above  : no dysuria, no hematuria  HEME: no anemia, no easy brusing  MUSCULOSKELETAL: no muscle aches, no back pain, no arthritis  PSYCH: no anxiety or depression  SKIN: no rashes     PE:  Vitals:    02/10/22 1937 02/10/22 1939 02/10/22 2002 02/10/22 2032   BP: (!) 85/53  (!) 85/54 (!) 91/55   Pulse:  69 69 69   Resp:    17    Temp:   (!) 35.8 °C (96.4 °F)    TempSrc:   Temporal    SpO2:  97% 98% 100%   Weight:       Height:         Gen: alert, slow speech, NAD, lying in bed  HEENT: deep scleral icterus, nares patent, Mucous membranes dry  Neck: supple, no cervical or supraclavicular adenopathy  CVS: regular rhythm, normal rate, no MRG  Pulm: CTAB, no crackles  Abd: soft, distended, NT, no guarding or rebound  Ext: no edema, normal sensation  NEURO: slow speech, slow movements, slurred speech  Skin: warm, no rash, +jaundice  Psych: odd affect     LABS:  Lab Results   Component Value Date/Time    SODIUM 131 (L) 02/10/2022 07:37 PM    POTASSIUM 3.7 02/10/2022 07:37 PM    CHLORIDE 90 (L) 02/10/2022 07:37 PM    CO2 14 (L) 02/10/2022 07:37 PM    GLUCOSE 114 (H) 02/10/2022 07:37 PM     (HH) 02/10/2022 07:37 PM    CREATININE 8.97 (HH) 02/10/2022 07:37 PM      Lab Results   Component Value Date/Time    WBC 20.5 (H) 02/10/2022 08:00 PM    RBC 2.61 (L) 02/10/2022 08:00 PM    HEMOGLOBIN 9.3 (L) 02/10/2022 08:00 PM    HEMATOCRIT 24.9 (L) 02/10/2022 08:00 PM    MCV 95.4 02/10/2022 08:00 PM    MCH 35.6 (H) 02/10/2022 08:00 PM    MCHC 37.3 (H) 02/10/2022 08:00 PM    MPV 11.2 02/10/2022 08:00 PM    NEUTSPOLYS 92.70 (H) 02/10/2022 08:00 PM    LYMPHOCYTES 2.60 (L) 02/10/2022 08:00 PM    MONOCYTES 2.40 02/10/2022 08:00 PM    EOSINOPHILS 0.00 02/10/2022 08:00 PM    BASOPHILS 0.60 02/10/2022 08:00 PM        Lab Results   Component Value Date/Time    PROTHROMBTM 26.9 (H) 02/10/2022 07:37 PM    INR 2.58 (H) 02/10/2022 07:37 PM      Recent Labs     02/10/22  1937   ASTSGOT 154*   ALTSGPT 36   TBILIRUBIN 31.9*   GLOBULIN 2.3   INR 2.58*   AMMONIA 98*          Problem List Items Addressed This Visit     None           ASSESSMENT: 40 yo male with acute liver failure, acute renal failure, hematochezia, elevated Ammonia, COVID and leukocytosis who was transferred for further care. This patient has a MELD score >40 and in the setting of COVID, his prognosis  is very poor. He did receive some blood products prior to transfer and has not had any additional bleeding since arrival. Endoscopic evaluation will be deferred at this time. He is very sick and likely would not tolerate sedation for EGD. I recommend supportive care and monitoring for now with consideration of endoscopic evaluation of his bleeding if he has some improvement     PLAN:   1) monitor H/H and consider additional transfusion although blood transfusion seems futile  2) IV hydration  3) recommend antibiotics for GI bleed in presumed cirrhosis  4) Octreotide, albumin and midodrine for hepatorenal   5) CIWA monitoring for withdrawal  6) Lactulose for AMS    His prognosis is very poor. Without COVID, his MELD score is greater than 40 which predicts a 60+% mortality. With COVID, I suspect his risk of death is much higher. I recommend consideration of palliative care consultation.      Thank you for this consult.     Tariq Cole MD

## 2022-02-11 NOTE — PROGRESS NOTES
Family at patient's bedside.  Brother and niece from [facility] came up to pay their regards.  Answered questions for her brother.  He is questioning how much longer his brother possibly had the lip.  Patient's blood pressure is currently in the 60s with steady oxygen levels.  Patient is visibly ill chronically and jaundice.  Patient is very cachectic.  Patient is not in distress.  Question answered for family.  No exact timeline of death but expect hours to days.  Patient is comfort care and in no current distress.  No further request or questions from family.

## 2022-02-11 NOTE — CONSULTS
CRITICAL CARE CONSULT    HPI:  41-year-old man with a history of alcoholism drinking up to 1 gallon of vodka a day is presenting with liver failure.  Slightly confused but not severely encephalopathic or with a depressed mental status.  Meld calculated > 40.  He has also some gastrointestinal bleeding with melena earlier though hemoglobin is around 9.  GI saw in the emergency department, no immediate plans to scope.  Hemodynamically stable.      LABS:  WBC 20  Hgb 9  Plt 76    Co2 14  AG 27    Cr 8.97    AST//36  Alk PO4 223  T Bili 32    INR 2.6      VITALS:  Vitals:    02/10/22 2032   BP: (!) 91/55   Pulse: 69   Resp: 17   Temp:    SpO2: 100%       PHYSICAL EXAM:  General: Jaundiced, pleasant  CV: RRR  Pulmonary: CTAB  Abdomen: Distended, nontender no rebound or guarding  Neuro: Confused but awake and alert  Skin: Jaundiced    IMAGING:  DX-CHEST-PORTABLE (1 VIEW)   Final Result         1.  No acute cardiopulmonary disease.            ASSESSMENT/PLAN:  Okay for IMCU admission  ICU will be here if needed    * Liver failure without hepatic coma (HCC)  Assessment & Plan  Liver failure secondary to alcohol abuse  MELD >40  Long term survival is poor    Bleeding could be multifactorial:  - PPI BID  - Octreotide  - Rocephin  - GI can see in the morning unless unstable tonight  - transfuse Hgb >7  - transfuse platelet >50    Given liver disease and cirrhosis:  - Ok for MAP goal >60  - Midodrine 10mg TID  - Fluid boluses likely to be only transiently effective  - Ongoing alcoholism, not candidate for transplant  -If ascites could obtain diagnostic paracentesis if there is a pocket amenable    IMCU is ok  ICU will be available for assistance if needed    Melena  Assessment & Plan  Unclear source though likely upper GI  ?  Varices,?  Gastritis,?  Hypertensive gastropathy etc. Etc.  GI was seeing them in the emergency department  Does not need emergent endoscopy as currently stable  Has received FFP  already  Bilateral large-bore IVs  Active type and screen    PPI twice daily  Octreotide drip  Transfuse Hgb > 7  Transfuse platelet > 50    BRIGITTE (acute kidney injury) (HCC)  Assessment & Plan  Possibly due to hepatorenal syndrome  Octreotide  Midodrine  Already improving  Likely will not recover substantially, dialysis is not something that will change his overall outcome    I have ordered him albumin 1 g/kg for 2 days    Alcohol abuse  Assessment & Plan  This is the driving factor for all of his problems unfortunately  Can monitor for withdrawals in the IMCU  Precedex versus CIWA scoring and Ativan is appropriate      Rhys Tidwell M.D.

## 2022-02-11 NOTE — ED PROVIDER NOTES
ED Provider Note    CHIEF COMPLAINT  Chief Complaint   Patient presents with   • Sent by MD     transfer from [facility] for GI bleed       HPI  Denilson Castellon is a 41 y.o. male who presents in transfer from [facility] for liver failure and kidney failure in the setting of GI bleeding.  Patient has a past medical history of alcohol abuse and has been drinking a gallon of vodka a day for many years.  He does have a history of cirrhosis but he notes that he has never been yellow like he has today.  Per report and per the patient's own admission, he has not drank alcohol on 14 or 15 days and only started turning yellow after stopping the alcohol.  He was seen and evaluated at an outside facility and felt he needed transfer to a higher level of care.  Unclear if hepatology was consulted from that facility however patient was sent here for evaluation of the GI bleeding and hypotension.  Patient notes he does not have any abdominal pain currently but notes an unclear history of bright red blood per rectum as well as melena.  He gives a history of only scant coffee-ground emesis.  Patient was tested for COVID at that facility and found to be positive.    REVIEW OF SYSTEMS  Constitutional: Denies any measured fevers at home.  Notes chills.  Skin: It is yellow and itchy  HEENT: Dry mouth but no sore throat or runny nose.  Neck: No neck pain  Chest: No pain or rashes  Pulm: No shortness of breath, cough, wheezing, stridor, or pain with inspiration/expiration  Gastrointestinal: No constipation or bloating.  Notes dark tarry stools and bright red blood per rectum with clots for the past few days.  Also notes coffee-ground emesis.  Genitourinary: No dysuria or hematuria  Musculoskeletal: No recent trauma.  No lower extremity pain  Neurologic: Feels disoriented.  No numbness or tingling to extremities  Psych: Nonsuicidal   Heme: Heavy drinker but no known bleeding or bruising problems.  Immuno: No hx of recurrent  "infections    PAST FAM HISTORY  No family history on file.    PAST MEDICAL HISTORY   Heavy alcohol use    SOCIAL HISTORY  Social History     Tobacco Use   • Smoking status: Never Smoker   • Smokeless tobacco: Not on file   Substance and Sexual Activity   • Alcohol use: Yes     Comment: 6x 24oz beer daily   • Drug use: No   • Sexual activity: Not on file       SURGICAL HISTORY   has a past surgical history that includes mandible fracture orif (6/9/2011); nasal fracture reduction closed (6/9/2011); and laceration repair (6/9/2011).    CURRENT MEDICATIONS  Home Medications    **Home medications have not yet been reviewed for this encounter**         ALLERGIES  No Known Allergies    PHYSICAL EXAM  VITAL SIGNS: BP (!) 72/42   Pulse 68   Temp 36 °C (96.8 °F) (Temporal)   Resp (!) 8   Ht 1.803 m (5' 11\")   Wt 78 kg (171 lb 15.3 oz)   SpO2 96%   BMI 23.98 kg/m²    Gen: Appears tired, but pleasant.  Bright yellow.  HEENT: No signs of trauma, Bilateral external ears normal, Nose normal. Conjunctiva actively icteric.   Neck:  No tenderness, Supple, No masses  Lymphatic: No cervical lymphadenopathy noted.   Cardiovascular: Regular rate and rhythm.  Capillary refill around 5 seconds to all extremities.  1+ distal pulses.  Thorax & Lungs: Normal breath sounds, No respiratory distress, No wheezing bilateral chest rise  Abdomen: Bowel sounds normal, Soft, No tenderness, very mild distention with a fluid wave noted, No pulsatile masses. No Guarding or rebound  Skin: Warm, Dry.  Markedly jaundiced  Extremities: Intact and equal distal pulses however somewhat diminished.  Edema noted to lower extremities.  Neurologic: Appears tired, slurring speech.  Psychiatric: Affect pleasant      LABS  Results for orders placed or performed during the hospital encounter of 02/10/22   AMMONIA   Result Value Ref Range    Ammonia 98 (H) 11 - 45 umol/L   CBC WITH DIFFERENTIAL   Result Value Ref Range    WBC 20.5 (H) 4.8 - 10.8 K/uL    RBC 2.61 " (L) 4.70 - 6.10 M/uL    Hemoglobin 9.3 (L) 14.0 - 18.0 g/dL    Hematocrit 24.9 (L) 42.0 - 52.0 %    MCV 95.4 81.4 - 97.8 fL    MCH 35.6 (H) 27.0 - 33.0 pg    MCHC 37.3 (H) 33.7 - 35.3 g/dL    RDW 53.9 (H) 35.9 - 50.0 fL    Platelet Count 76 (L) 164 - 446 K/uL    MPV 11.2 9.0 - 12.9 fL    Neutrophils-Polys 92.70 (H) 44.00 - 72.00 %    Lymphocytes 2.60 (L) 22.00 - 41.00 %    Monocytes 2.40 0.00 - 13.40 %    Eosinophils 0.00 0.00 - 6.90 %    Basophils 0.60 0.00 - 1.80 %    Immature Granulocytes 1.70 (H) 0.00 - 0.90 %    Nucleated RBC 0.00 /100 WBC    Neutrophils (Absolute) 19.02 (H) 1.82 - 7.42 K/uL    Lymphs (Absolute) 0.54 (L) 1.00 - 4.80 K/uL    Monos (Absolute) 0.50 0.00 - 0.85 K/uL    Eos (Absolute) 0.01 0.00 - 0.51 K/uL    Baso (Absolute) 0.12 0.00 - 0.12 K/uL    Immature Granulocytes (abs) 0.34 (H) 0.00 - 0.11 K/uL    NRBC (Absolute) 0.00 K/uL   COMP METABOLIC PANEL   Result Value Ref Range    Sodium 131 (L) 135 - 145 mmol/L    Potassium 3.7 3.6 - 5.5 mmol/L    Chloride 90 (L) 96 - 112 mmol/L    Co2 14 (L) 20 - 33 mmol/L    Anion Gap 27.0 (H) 7.0 - 16.0    Glucose 114 (H) 65 - 99 mg/dL    Bun 114 (HH) 8 - 22 mg/dL    Creatinine 8.97 (HH) 0.50 - 1.40 mg/dL    Calcium 7.4 (L) 8.5 - 10.5 mg/dL    AST(SGOT) 154 (H) 12 - 45 U/L    ALT(SGPT) 36 2 - 50 U/L    Alkaline Phosphatase 223 (H) 30 - 99 U/L    Total Bilirubin 31.9 (H) 0.1 - 1.5 mg/dL    Albumin 2.7 (L) 3.2 - 4.9 g/dL    Total Protein 5.0 (L) 6.0 - 8.2 g/dL    Globulin 2.3 1.9 - 3.5 g/dL    A-G Ratio 1.2 g/dL   LIPASE   Result Value Ref Range    Lipase 186 (H) 11 - 82 U/L   TROPONIN   Result Value Ref Range    Troponin T 35 (H) 6 - 19 ng/L   PROTHROMBIN TIME (INR)   Result Value Ref Range    PT 26.9 (H) 12.0 - 14.6 sec    INR 2.58 (H) 0.87 - 1.13   APTT   Result Value Ref Range    APTT 95.0 (H) 24.7 - 36.0 sec   COD (ADULT)   Result Value Ref Range    ABO Grouping Only O     Rh Grouping Only POS     Antibody Screen-Cod NEG    VENOUS BLOOD GAS   Result Value Ref  Range    Venous Bg Ph 7.32 7.31 - 7.45    Venous Bg Pco2 30.0 (L) 41.0 - 51.0 mmHg    Venous Bg Po2 39.4 25.0 - 40.0 mmHg    Venous Bg O2 Saturation 64.3 %    Venous Bg Hco3 15 (L) 24 - 28 mmol/L    Venous Bg Base Excess -10 mmol/L    Body Temp see below Centigrade   MAGNESIUM   Result Value Ref Range    Magnesium 2.7 (H) 1.5 - 2.5 mg/dL   ESTIMATED GFR   Result Value Ref Range    GFR If  8 (A) >60 mL/min/1.73 m 2    GFR If Non African American 6 (A) >60 mL/min/1.73 m 2   DIAGNOSTIC ALCOHOL   Result Value Ref Range    Diagnostic Alcohol <10.1 0.0 - 10.0 mg/dL   CBC WITH DIFFERENTIAL   Result Value Ref Range    WBC 17.3 (H) 4.8 - 10.8 K/uL    RBC 2.37 (L) 4.70 - 6.10 M/uL    Hemoglobin 7.9 (L) 14.0 - 18.0 g/dL    Hematocrit 22.6 (L) 42.0 - 52.0 %    MCV 95.4 81.4 - 97.8 fL    MCH 33.3 (H) 27.0 - 33.0 pg    MCHC 35.0 33.7 - 35.3 g/dL    RDW 56.9 (H) 35.9 - 50.0 fL    Platelet Count 65 (L) 164 - 446 K/uL    MPV 11.2 9.0 - 12.9 fL    Neutrophils-Polys 91.80 (H) 44.00 - 72.00 %    Lymphocytes 3.10 (L) 22.00 - 41.00 %    Monocytes 3.20 0.00 - 13.40 %    Eosinophils 0.10 0.00 - 6.90 %    Basophils 0.50 0.00 - 1.80 %    Immature Granulocytes 1.30 (H) 0.00 - 0.90 %    Nucleated RBC 0.00 /100 WBC    Neutrophils (Absolute) 15.86 (H) 1.82 - 7.42 K/uL    Lymphs (Absolute) 0.53 (L) 1.00 - 4.80 K/uL    Monos (Absolute) 0.56 0.00 - 0.85 K/uL    Eos (Absolute) 0.01 0.00 - 0.51 K/uL    Baso (Absolute) 0.09 0.00 - 0.12 K/uL    Immature Granulocytes (abs) 0.22 (H) 0.00 - 0.11 K/uL    NRBC (Absolute) 0.00 K/uL   Comp Metabolic Panel   Result Value Ref Range    Sodium 131 (L) 135 - 145 mmol/L    Potassium 3.6 3.6 - 5.5 mmol/L    Chloride 90 (L) 96 - 112 mmol/L    Co2 13 (L) 20 - 33 mmol/L    Anion Gap 28.0 (H) 7.0 - 16.0    Glucose 116 (H) 65 - 99 mg/dL    Bun 113 (HH) 8 - 22 mg/dL    Creatinine 8.62 (HH) 0.50 - 1.40 mg/dL    Calcium 7.4 (L) 8.5 - 10.5 mg/dL    AST(SGOT) 136 (H) 12 - 45 U/L    ALT(SGPT) 30 2 - 50 U/L     Alkaline Phosphatase 181 (H) 30 - 99 U/L    Total Bilirubin 30.9 (H) 0.1 - 1.5 mg/dL    Albumin 3.3 3.2 - 4.9 g/dL    Total Protein 5.1 (L) 6.0 - 8.2 g/dL    Globulin 1.8 (L) 1.9 - 3.5 g/dL    A-G Ratio 1.8 g/dL   ESTIMATED GFR   Result Value Ref Range    GFR If  8 (A) >60 mL/min/1.73 m 2    GFR If Non African American 7 (A) >60 mL/min/1.73 m 2   EKG (NOW)   Result Value Ref Range    Report       [facility] Emergency Dept.    Test Date:  2022-02-10  Pt Name:    BLAYNE RICKS       Department: ER  MRN:        [id number]                      Room:       [facility]  Gender:     Male                         Technician: [id number]  :        1980                   Requested By:VICKI MARTINEZ  Order #:    [id number]                    Reading MD:    Measurements  Intervals                                Axis  Rate:       71                           P:          43  AK:         192                          QRS:        -41  QRSD:       123                          T:          9  QT:         504  QTc:        548    Interpretive Statements  Sinus rhythm  Nonspecific IVCD with LAD  Compared to ECG 2018 11:53:55  Intraventricular conduction delay now present  ST (T wave) deviation no longer present         RADIOLOGY  DX-CHEST-PORTABLE (1 VIEW)   Final Result         1.  No acute cardiopulmonary disease.        Critical Care Note  Upon my evaluation, this patient had high probability of imminent and life-threatening deterioration due to liver failure, kidney failure, GI bleeding, critical anemia, hypotension, which required my direct attention, intervention, and personal management. I personally provided 60 minutes of critical care time exclusive of time spent on separately billable procedures. Time includes review of laboratory data, radiology results, discussion with consultants, and monitoring for potential decompensation.     HYDRATION: Based on the patient's presentation of  Hypotension the patient was given IV fluids. IV Hydration was used because oral hydration was not adequate alone. Upon recheck following hydration, the patient was unchanged.    COURSE & MEDICAL DECISION MAKING  Patient arrives for evaluation of what appears to be severe hepatorenal syndrome with hypotension in the setting of severe alcohol abuse. Patient was transferred from the outside facility due to the need for evaluation of GI bleeding however it is unclear whether hepatology was consulted at that facility. Patient arrives hemodynamically unstable with a low blood pressure likely due to a combination of dehydration, acute blood loss, and adrenal insufficiency. Patient is tired appearing but interacts fairly well. He states he does feel confused and sometimes takes multiple prompts to perform tasks or follow commands. He does not appear to have significant ascites however he does have a fluid wave indicative of at least some ascites. He does not have any significant abdominal pain however. We will repeat labs and discussed the case with the GI physician.    Case discussed with Dr. Pritchett GI, who notes that intervention at this point is unlikely to benefit the patient or change the outcome. Patient does not appear to have a large output of blood, either rectally, or by emesis. At this point attempting to optimize patient's hydration status and admit to the stepdown unit is reasonable. I did discuss the case with the intensivist as well as the hospitalist who will admit primarily. At this point there is no sense in consulting hepatology as the patient is not a candidate for transplant and is likely too unstable for transfer anyway.    FINAL IMPRESSION  1. End-stage alcoholic cirrhosis  2. Hepatorenal syndrome  3. Hypotension  4. GI bleeding  5. History of alcohol abuse  6. Anemia  7. Metabolic acidosis    Electronically signed by: Tremayne Kurtz M.D., 2/10/2022 7:35 PM

## 2022-02-11 NOTE — PROGRESS NOTES
4 Eyes Skin Assessment Completed by TANA Bobby and TANA Whitney.    Head Jaundice  Ears Jaundice  Nose Jaundice  Mouth WDL  Neck Jaundice  Breast/Chest Jaundice  Shoulder Blades WDL  Spine WDL  (R) Arm/Elbow/Hand WDL  (L) Arm/Elbow/Hand WDL  Abdomen WDL  Groin Redness and Blanching  Scrotum/Coccyx/Buttocks Redness and Blanching  (R) Leg Scab  (L) Leg Scab and Jaundice  (R) Heel/Foot/Toe Jaundice  (L) Heel/Foot/Toe Jaundice          Devices In Places ECG, Blood Pressure Cuff and Pulse Ox      Interventions In Place Barrier Cream    Possible Skin Injury No    Pictures Uploaded Into Epic N/A  Wound Consult Placed N/A  RN Wound Prevention Protocol Ordered No

## 2022-02-11 NOTE — ASSESSMENT & PLAN NOTE
Liver failure secondary to alcohol abuse  MELD >40  Long term survival is poor    Bleeding could be multifactorial:  - PPI BID  - Octreotide  - Rocephin  - GI can see in the morning unless unstable tonight  - transfuse Hgb >7  - transfuse platelet >50    Given liver disease and cirrhosis:  - Ok for MAP goal >60  - Midodrine 10mg TID  - Fluid boluses likely to be only transiently effective  - Ongoing alcoholism, not candidate for transplant  -If ascites could obtain diagnostic paracentesis if there is a pocket amenable    IMCU is ok  ICU will be available for assistance if needed

## 2022-02-11 NOTE — PROGRESS NOTES
Paged Dr. Martinez to bedside. Pt BP, O2, and mentation declining rapidly. Pt has become lethargic and hard to arouse. MD updated family on phone. New orders in place.

## 2022-02-11 NOTE — ASSESSMENT & PLAN NOTE
This is the driving factor for all of his problems unfortunately  Can monitor for withdrawals in the IMCU  Precedex versus CIWA scoring and Ativan is appropriate

## 2022-02-11 NOTE — ED TRIAGE NOTES
Chief Complaint   Patient presents with   • Sent by MD     transfer from [facility] for GI bleed     Pt BIB EMS for above. Pt quit drinking 15 days ago, pt was drinking about a gallon of vodka a day, family reported to EMS that pt began to jaundice. Pt arrives jaundiced throughout. Pt received 2 units PRBC and 2 units FFP, ceftriaxone, and protonix prior to arrival.

## 2022-02-11 NOTE — H&P
Hospital Medicine History & Physical Note    Date of Service  2/10/2022    Primary Care Physician  Pcp Not In Computer    Consultants  GI, medical ICU    Code Status  Full Code    Chief Complaint  Chief Complaint   Patient presents with   • Sent by MD     transfer from [facility] for GI bleed       History of Presenting Illness  Denilson Castellon is a 41 y.o. male who presented 2/10/2022 with liver failure and kidney failure   Patient has a longstanding history of alcohol use and cirrhosis.  Last alcoholic drink about 2 weeks ago.  He reported turning very yellow after stopping alcohol.  He then presented to [facility] for several episodes of melena and bright red blood per rectum.  He was found to have Covid positive test over there.  Patient transferred to [facility] for further care.  Is unclear whether GI was contacted.    In the ED, patient found to have borderline low blood pressure, other vital signs within normal limits.  Remarkable labs include neutrophil leukocytosis, normocytic anemia, thrombocytopenia, hyponatremia, anion gap metabolic acidosis, BRIGITTE, ammonia 98, lipase 186, troponin 35, INR 2.58.  Chest x-ray negative for acute cardiopulmonary abnormality.  Patient was seen by GI, who did not feel that patient should be scoped immediately as he has very poor prognosis.  ICU saw the patient, recommended IMCU.  Patient was given 2 units of fresh frozen plasma and albumin ordered by ER physician.    I discussed the plan of care with patient.    Review of Systems  ROS  Unable to assess due to acuity of condition    Past Medical History  No pertinent medical history    Surgical History   has a past surgical history that includes mandible fracture orif (6/9/2011); nasal fracture reduction closed (6/9/2011); and laceration repair (6/9/2011).     Family History   Family history reviewed with patient. There is no family history that is pertinent to the chief complaint.     Social History   reports that he has  never smoked. He does not have any smokeless tobacco history on file. He reports current alcohol use. He reports that he does not use drugs.    Allergies  No Known Allergies    Medications  Prior to Admission Medications   Prescriptions Last Dose Informant Patient Reported? Taking?   bacitracin 500 UNIT/GM Ointment   No No   Sig: Apply thin layer over suture site twice a day   folic acid (FOLVITE) 1 MG Tab   No No   Sig: Take 1 Tab by mouth every day.   multivitamin (THERAGRAN) Tab   No No   Sig: Take 1 Tab by mouth every day.   thiamine (THIAMINE) 100 MG tablet   No No   Sig: Take 1 Tab by mouth every day.      Facility-Administered Medications: None       Physical Exam  Temp:  [35.8 °C (96.4 °F)] 35.8 °C (96.4 °F)  Pulse:  [69] 69  Resp:  [17] 17  BP: (85-91)/(53-55) 91/55  SpO2:  [97 %-100 %] 100 %  Blood Pressure: (!) 91/55   Temperature: (!) 35.8 °C (96.4 °F)   Pulse: 69   Respiration: 17   Pulse Oximetry: 100 %       Physical Exam  Constitutional:       Appearance: Normal appearance. He is normal weight.   HENT:      Head: Normocephalic.      Nose: Nose normal.      Mouth/Throat:      Mouth: Mucous membranes are moist.   Eyes:      Pupils: Pupils are equal, round, and reactive to light.      Comments: Scleral icterus   Cardiovascular:      Rate and Rhythm: Normal rate and regular rhythm.   Pulmonary:      Effort: Pulmonary effort is normal.      Breath sounds: Normal breath sounds.   Abdominal:      General: Abdomen is flat.      Palpations: Abdomen is soft.      Comments: Ascitic abdomen  Fluid wave positive   Skin:     General: Skin is warm.      Comments: Jaundice   Neurological:      General: No focal deficit present.      Mental Status: He is alert and oriented to person, place, and time. Mental status is at baseline.   Psychiatric:         Mood and Affect: Mood normal.         Behavior: Behavior normal.         Thought Content: Thought content normal.         Judgment: Judgment normal.          Laboratory:  Recent Labs     02/10/22  2000   WBC 20.5*   RBC 2.61*   HEMOGLOBIN 9.3*   HEMATOCRIT 24.9*   MCV 95.4   MCH 35.6*   MCHC 37.3*   RDW 53.9*   PLATELETCT 76*   MPV 11.2     Recent Labs     02/10/22  1937   SODIUM 131*   POTASSIUM 3.7   CHLORIDE 90*   CO2 14*   GLUCOSE 114*   *   CREATININE 8.97*   CALCIUM 7.4*     Recent Labs     02/10/22  1937   ALTSGPT 36   ASTSGOT 154*   ALKPHOSPHAT 223*   TBILIRUBIN 31.9*   LIPASE 186*   GLUCOSE 114*     Recent Labs     02/10/22  1937   APTT 95.0*   INR 2.58*     No results for input(s): NTPROBNP in the last 72 hours.      Recent Labs     02/10/22  1937   TROPONINT 35*       Imaging:  DX-CHEST-PORTABLE (1 VIEW)   Final Result         1.  No acute cardiopulmonary disease.          X-Ray:  I have personally reviewed the images and compared with prior images.    Assessment/Plan:  I anticipate this patient will require at least two midnights for appropriate medical management, necessitating inpatient admission.    * Melena  Assessment & Plan  Given fresh frozen plasma at outside facility  Fluids  PPI  Octreotide  Rocephin  Albumin  Trend hemoglobin  GI saw patient, no immediate need for scope  Serial CBC    BRIGITTE (acute kidney injury) (HCC)  Assessment & Plan  Likely from hepatorenal syndrome  Fluids, keep map over 65  On midodrine and albumin  Consider nephrology consult    Liver failure without hepatic coma (HCC)- (present on admission)  Assessment & Plan  Meld score over 40, prognosis poor  From excessive chronic alcohol use    Alcohol abuse- (present on admission)  Assessment & Plan  WA protocol  Multivitamins  Monitor for DTs      VTE prophylaxis: pharmacologic prophylaxis contraindicated due to Bleeding

## 2022-02-12 NOTE — PROGRESS NOTES
[facility] Medicine Daily Progress Note    Date of Service  2/12/2022    Chief Complaint  Transfer from [facility] for GI bleed    Hospital Course  Denilson Castellon is a 41 y.o. male with a history of cirrhosis and heavy alcohol use presented with multiple organ failure with acute renal failure, hepatic encephalopathy, and cirrhosis.  Per [facility] the patient was having melena and bright red blood per rectum as well as significant jaundice.  He was found to have a positive Covid test there.  He was transferred to [facility] for higher level of care.  Upon admission and with further discussion with the patient's mother decision was made for comfort measures.    Interval Problem Update  2/12/2022.  Patient's brother at bedside.  Brother was distraught that the patient was not being fed given water.  Patient is currently obtunded nonresponsive explained to the brother do not give IV fluids in the setting where the body is shutting down.  I explained comfort care process with the brother.  I explained to him that given his young age potential healthy lungs and heart death/dying could be a longer process.  Currently the patient is nonresponsive.    I have personally seen and examined the patient at bedside. I discussed the plan of care with bedside RN.    Consultants/Specialty  none    Code Status  Comfort Care/DNR    Disposition  Patient is not medically cleared for discharge.   Anticipate discharge to to hospice.  I have placed the appropriate orders for post-discharge needs.    Review of Systems  Review of Systems   Unable to perform ROS: Patient unresponsive        Physical Exam  Temp:  [36.2 °C (97.1 °F)-36.4 °C (97.5 °F)] 36.4 °C (97.5 °F)  Pulse:  [73-82] 82  BP: (73-74)/(42-45) 73/45  SpO2:  [89 %-94 %] 93 %    Physical Exam  Constitutional:       Appearance: He is ill-appearing and toxic-appearing.      Comments: Visibly jaundiced   HENT:      Head: Normocephalic and atraumatic.      Nose:  Nose normal.   Eyes:      General: Scleral icterus present.   Pulmonary:      Effort: Bradypnea present. No respiratory distress.   Abdominal:      General: There is distension.   Skin:     Coloration: Skin is jaundiced.         Fluids  No intake or output data in the 24 hours ending 02/12/22 1359    Laboratory  Recent Labs     02/10/22  2000 02/10/22  2330   WBC 20.5* 17.3*   RBC 2.61* 2.37*   HEMOGLOBIN 9.3* 7.9*   HEMATOCRIT 24.9* 22.6*   MCV 95.4 95.4   MCH 35.6* 33.3*   MCHC 37.3* 35.0   RDW 53.9* 56.9*   PLATELETCT 76* 65*   MPV 11.2 11.2     Recent Labs     02/10/22  1937 02/10/22  2330   SODIUM 131* 131*   POTASSIUM 3.7 3.6   CHLORIDE 90* 90*   CO2 14* 13*   GLUCOSE 114* 116*   * 113*   CREATININE 8.97* 8.62*   CALCIUM 7.4* 7.4*     Recent Labs     02/10/22  1937   APTT 95.0*   INR 2.58*               Imaging  DX-CHEST-PORTABLE (1 VIEW)   Final Result         1.  No acute cardiopulmonary disease.           Assessment/Plan  * Melena  Assessment & Plan  COMFORT CARE    Liver failure with hepatic coma (HCC)- (present on admission)  Assessment & Plan  Hepatic encephalopathy  COMFORT CARE    BRIGITTE (acute kidney injury) (HCC)  Assessment & Plan  Likely from hepatorenal syndrome  2/10 BUN:114, Cr:8.97  COMFORT CARE    Liver failure without hepatic coma (HCC)- (present on admission)  Assessment & Plan  Meld score over 40, prognosis poor  From excessive chronic alcohol use  COMFORT CARE    Alcohol abuse- (present on admission)  Assessment & Plan  Monitor for Seizures  As needed IV benzodiazipines     VTE prophylaxis: Comfort care    I have performed a physical exam and reviewed and updated ROS and Plan today (2/12/2022). In review of yesterday's note (2/11/2022), there are no changes except as documented above.

## 2022-02-12 NOTE — HOSPICE
Pt accepted to community hospice, not GIP appropriate per Dr. Al.    Pt is from [place], no local family.  Brother is here & states that family is unable to care for him at home & that is part of the reason he is here.    Brother given mortuary list & we discussed making final arrangements.

## 2022-02-12 NOTE — CARE PLAN
The patient is Watcher - Medium risk of patient condition declining or worsening    Shift Goals  Clinical Goals: Comfort care, free of pain, remains comfortable  Patient Goals: Rest comfortably, free of pain  Family Goals: NA    Progress made toward(s) clinical / shift goals:        Problem: Pain - Standard  Goal: Alleviation of pain or a reduction in pain to the patient’s comfort goal  Outcome: Progressing     Problem: Fall Risk  Goal: Patient will remain free from falls  Outcome: Progressing     Problem: Skin Integrity  Goal: Skin integrity is maintained or improved  Outcome: Progressing       Patient is not progressing towards the following goals:      Problem: Knowledge Deficit - Standard  Goal: Patient and family/care givers will demonstrate understanding of plan of care, disease process/condition, diagnostic tests and medications  Outcome: Not Progressing

## 2022-02-13 NOTE — PROGRESS NOTES
Family at bedside stated that the patient's family from [place] is requesting that the patient have prayer at bedside preferably by a  for end of life care.  & spirit services contacted.   currently present at bedside providing prayer & comfort. Pt currently resting in bed with no visual signs of distress or discomfort. Atropine & Morphine provided as needed for pain, comfort, & to aid with secretions. Oral care, suctioning, & repositioning provided frequently. Will continue to monitor closely.

## 2022-02-13 NOTE — DISCHARGE PLANNING
SW asked by Bedside RN for assistance with getting .  SW informed that Pt is on Comfort care and family is requesting prayer.  SW called on-call  and updated RN that she is on her way to the hospital.

## 2022-02-13 NOTE — CARE PLAN
The patient is Unstable - High likelihood or risk of patient condition declining or worsening    Shift Goals  Clinical Goals: Comfort care, free of pain, sleep comfortably, decrease in secretions  Patient Goals: CIERA  Family Goals: //spiritual care for prayer at bedside    Progress made toward(s) clinical / shift goals:        Problem: Pain - Standard  Goal: Alleviation of pain or a reduction in pain to the patient’s comfort goal  Outcome: Progressing     Problem: Skin Integrity  Goal: Skin integrity is maintained or improved  Outcome: Progressing       Patient is not progressing towards the following goals:      Problem: Knowledge Deficit - Standard  Goal: Patient and family/care givers will demonstrate understanding of plan of care, disease process/condition, diagnostic tests and medications  Outcome: Not Progressing

## 2022-02-13 NOTE — PROGRESS NOTES
"Family at bedside. Brother stated, \"Why are you letting the patient die?\", this RN educated family on comfort care and current goals of care. Patient family still concerned in regard to nutrition for the patient. Dr. Ray notified and came to bedside for further education.   "

## 2022-02-14 NOTE — DISCHARGE SUMMARY
Death Summary    Cause of Death  Multiple organ failure with acute renal failure, hepatic encephalopathy due to cirrhosis of the liver due to alcohol abuse and corona virus infection.    Comorbid Conditions at the Time of Death  Principal Problem:    Melena POA: Unknown  Active Problems:    Alcohol abuse POA: Yes      Overview: Continue supplemental thiamine and folate. Alcohol withdrawal       surveillance.     Liver failure without hepatic coma (HCC) POA: Yes    BRIGITTE (acute kidney injury) (HCC) POA: Unknown    Liver failure with hepatic coma (HCC) POA: Yes  Resolved Problems:    * No resolved hospital problems. *      History of Presenting Illness and Hospital Course  Denilson Castellon is a 41 y.o. male with a history of cirrhosis and heavy alcohol use presented with multiple organ failure with acute renal failure, hepatic encephalopathy, and cirrhosis.  Per [facility] the patient was having melena and bright red blood per rectum as well as significant jaundice.  He was found to have a positive Covid test there.  He was transferred to [facility] for higher level of care.  Upon admission and with further discussion with the patient's mother decision was made for comfort measures.    Death Date: 02/13/22   Death Time: 1022         Pronounced By (RN1): Abraham Colon  Pronounced By (RN2): Kadi Eddy

## 2022-02-16 NOTE — DOCUMENTATION QUERY
"                                                                         [facility]                                                                       Query Response Note      PATIENT:               BLAYNE MOORE  ACCT #:                  [id number]  MRN:                     [id number]  :                      1980  ADMIT DATE:       2/10/2022 7:27 PM  DISCH DATE:        2022 10:22 AM  RESPONDING  PROVIDER #:        [id number]           QUERY TEXT:    \"Hepatorenal syndrome\" is documented in the H&P and Progress Notes as 'likely' and 'possible' however, is not documented in the DC Summary.  Please clarify status of this condition:    NOTE:  If an appropriate response is not listed below, please respond with a new note.    The patient's Clinical Indicators include:  2/10 BUN: 114 -> 113; Cr: 8.97 -> 8.62; GFR: 6 -> 7  2/10 AST: 154 -> 136; ALT: 36 -> 30; Alk Phos: 223 -> 181; Tot Bili: 31.9 -> 30.9  2/10 H&P: BRIGITTE likely from hepatorenal syndrome  2/10 CC Consult: BRIGITTE possibly d/t hepatorenal syndrome  2/10 GI Consult: Octreotide, albumin & midodrine for hepatorenal   HM PN: Transitioned to comfort care    PN: BRIGITTE likely from hepatorenal syndrome.   Treatment: Fluids, map > 65; midodrine; albumin; lab testing  Risk Factors: Liver failure; cirrhosis of liver; alcohol abuse; BRIGITTE    Thank You,  aLura Valles RN  Clinical    Connect via [employer]  Options provided:   -- Hepatorenal syndrome is ruled in   -- Hepatorenal syndrome is ruled out   -- Unable to determine      Query created by: Laura Valles on 2/15/2022 2:21 PM    RESPONSE TEXT:    Hepatorenal syndrome is ruled in          Electronically signed by:  SKYE KINNEY DO 2022 9:45 AM              "